# Patient Record
Sex: FEMALE | Race: WHITE | NOT HISPANIC OR LATINO | Employment: UNEMPLOYED | URBAN - NONMETROPOLITAN AREA
[De-identification: names, ages, dates, MRNs, and addresses within clinical notes are randomized per-mention and may not be internally consistent; named-entity substitution may affect disease eponyms.]

---

## 2023-03-09 DIAGNOSIS — G25.81 RESTLESS LEG: Primary | ICD-10-CM

## 2023-03-09 RX ORDER — ROPINIROLE 0.5 MG/1
0.5 TABLET, FILM COATED ORAL
COMMUNITY
End: 2023-03-09 | Stop reason: SDUPTHER

## 2023-03-09 RX ORDER — ROPINIROLE 0.5 MG/1
0.5 TABLET, FILM COATED ORAL NIGHTLY
Qty: 15 TABLET | Refills: 0 | Status: SHIPPED | OUTPATIENT
Start: 2023-03-09 | End: 2023-03-20 | Stop reason: SDUPTHER

## 2023-03-10 PROBLEM — R00.0 TACHYCARDIA: Status: ACTIVE | Noted: 2023-03-10

## 2023-03-10 PROBLEM — F41.1 ANXIETY, GENERALIZED: Status: ACTIVE | Noted: 2023-03-10

## 2023-03-10 PROBLEM — I10 BENIGN ESSENTIAL HYPERTENSION: Status: ACTIVE | Noted: 2023-03-10

## 2023-03-10 PROBLEM — E11.9 DIABETES MELLITUS (MULTI): Status: ACTIVE | Noted: 2023-03-10

## 2023-03-10 PROBLEM — G25.81 RESTLESS LEGS SYNDROME (RLS): Status: ACTIVE | Noted: 2023-03-10

## 2023-03-10 PROBLEM — K21.00 GASTRO-ESOPHAGEAL REFLUX DISEASE WITH ESOPHAGITIS: Status: ACTIVE | Noted: 2023-03-10

## 2023-03-10 PROBLEM — E66.813 CLASS 3 SEVERE OBESITY DUE TO EXCESS CALORIES WITH BODY MASS INDEX (BMI) OF 40.0 TO 44.9 IN ADULT: Status: ACTIVE | Noted: 2023-03-10

## 2023-03-10 PROBLEM — S69.92XA INJURY OF LEFT THUMB: Status: ACTIVE | Noted: 2023-03-10

## 2023-03-10 PROBLEM — E11.65 POORLY CONTROLLED TYPE 2 DIABETES MELLITUS (MULTI): Status: ACTIVE | Noted: 2023-03-10

## 2023-03-10 PROBLEM — J18.9 COMMUNITY ACQUIRED PNEUMONIA: Status: ACTIVE | Noted: 2023-03-10

## 2023-03-10 PROBLEM — N28.1 RENAL CYST: Status: ACTIVE | Noted: 2023-03-10

## 2023-03-10 PROBLEM — E66.01 CLASS 3 SEVERE OBESITY DUE TO EXCESS CALORIES WITH BODY MASS INDEX (BMI) OF 40.0 TO 44.9 IN ADULT (MULTI): Status: ACTIVE | Noted: 2023-03-10

## 2023-03-10 PROBLEM — E78.5 HYPERLIPIDEMIA: Status: ACTIVE | Noted: 2023-03-10

## 2023-03-10 RX ORDER — SEMAGLUTIDE 1.34 MG/ML
INJECTION, SOLUTION SUBCUTANEOUS
COMMUNITY
Start: 2021-08-31 | End: 2023-03-20 | Stop reason: SDUPTHER

## 2023-03-10 RX ORDER — GABAPENTIN 300 MG/1
1 CAPSULE ORAL NIGHTLY
COMMUNITY
Start: 2022-06-01 | End: 2023-03-20 | Stop reason: SINTOL

## 2023-03-10 RX ORDER — METFORMIN HYDROCHLORIDE 500 MG/1
TABLET ORAL
COMMUNITY
End: 2023-03-20 | Stop reason: SDUPTHER

## 2023-03-10 RX ORDER — LISINOPRIL 10 MG/1
1 TABLET ORAL DAILY
COMMUNITY
Start: 2022-01-25 | End: 2023-03-20 | Stop reason: SINTOL

## 2023-03-10 RX ORDER — BUSPIRONE HYDROCHLORIDE 15 MG/1
15 TABLET ORAL 3 TIMES DAILY
COMMUNITY

## 2023-03-10 RX ORDER — BUPROPION HYDROCHLORIDE 75 MG/1
1 TABLET ORAL DAILY
COMMUNITY
Start: 2021-08-31 | End: 2023-03-20 | Stop reason: ALTCHOICE

## 2023-03-10 RX ORDER — HYDROXYZINE PAMOATE 50 MG/1
50 CAPSULE ORAL NIGHTLY PRN
COMMUNITY

## 2023-03-20 ENCOUNTER — OFFICE VISIT (OUTPATIENT)
Dept: PRIMARY CARE | Facility: CLINIC | Age: 53
End: 2023-03-20
Payer: COMMERCIAL

## 2023-03-20 VITALS
HEART RATE: 91 BPM | BODY MASS INDEX: 44.02 KG/M2 | WEIGHT: 239.2 LBS | DIASTOLIC BLOOD PRESSURE: 72 MMHG | OXYGEN SATURATION: 96 % | SYSTOLIC BLOOD PRESSURE: 158 MMHG | HEIGHT: 62 IN

## 2023-03-20 DIAGNOSIS — F41.1 ANXIETY, GENERALIZED: ICD-10-CM

## 2023-03-20 DIAGNOSIS — G25.81 RESTLESS LEG: ICD-10-CM

## 2023-03-20 DIAGNOSIS — E11.9 TYPE 2 DIABETES MELLITUS WITHOUT COMPLICATION, WITHOUT LONG-TERM CURRENT USE OF INSULIN (MULTI): ICD-10-CM

## 2023-03-20 DIAGNOSIS — E11.65 POORLY CONTROLLED TYPE 2 DIABETES MELLITUS (MULTI): ICD-10-CM

## 2023-03-20 DIAGNOSIS — G25.81 RESTLESS LEGS SYNDROME (RLS): Primary | ICD-10-CM

## 2023-03-20 DIAGNOSIS — E66.01 CLASS 3 SEVERE OBESITY DUE TO EXCESS CALORIES WITH SERIOUS COMORBIDITY AND BODY MASS INDEX (BMI) OF 40.0 TO 44.9 IN ADULT (MULTI): ICD-10-CM

## 2023-03-20 DIAGNOSIS — N95.2 ATROPHIC VAGINITIS: ICD-10-CM

## 2023-03-20 DIAGNOSIS — E78.1 PURE HYPERTRIGLYCERIDEMIA: ICD-10-CM

## 2023-03-20 PROBLEM — J18.9 COMMUNITY ACQUIRED PNEUMONIA: Status: RESOLVED | Noted: 2023-03-10 | Resolved: 2023-03-20

## 2023-03-20 PROBLEM — I10 BENIGN ESSENTIAL HYPERTENSION: Status: RESOLVED | Noted: 2023-03-10 | Resolved: 2023-03-20

## 2023-03-20 LAB
ALANINE AMINOTRANSFERASE (SGPT) (U/L) IN SER/PLAS: 25 U/L (ref 7–45)
ALBUMIN (G/DL) IN SER/PLAS: 4.4 G/DL (ref 3.4–5)
ALBUMIN (MG/L) IN URINE: <7 MG/L
ALBUMIN/CREATININE (UG/MG) IN URINE: NORMAL UG/MG CRT (ref 0–30)
ALKALINE PHOSPHATASE (U/L) IN SER/PLAS: 86 U/L (ref 33–110)
ANION GAP IN SER/PLAS: 11 MMOL/L (ref 10–20)
ASPARTATE AMINOTRANSFERASE (SGOT) (U/L) IN SER/PLAS: 16 U/L (ref 9–39)
BILIRUBIN TOTAL (MG/DL) IN SER/PLAS: 0.4 MG/DL (ref 0–1.2)
CALCIUM (MG/DL) IN SER/PLAS: 9.1 MG/DL (ref 8.6–10.3)
CARBON DIOXIDE, TOTAL (MMOL/L) IN SER/PLAS: 30 MMOL/L (ref 21–32)
CHLORIDE (MMOL/L) IN SER/PLAS: 99 MMOL/L (ref 98–107)
CHOLESTEROL (MG/DL) IN SER/PLAS: 178 MG/DL (ref 0–199)
CHOLESTEROL IN HDL (MG/DL) IN SER/PLAS: 71.7 MG/DL
CHOLESTEROL/HDL RATIO: 2.5
CREATININE (MG/DL) IN SER/PLAS: 0.82 MG/DL (ref 0.5–1.05)
CREATININE (MG/DL) IN URINE: 74.2 MG/DL (ref 20–320)
GFR FEMALE: 86 ML/MIN/1.73M2
GLUCOSE (MG/DL) IN SER/PLAS: 321 MG/DL (ref 74–99)
LDL: 76 MG/DL (ref 0–99)
POC HEMOGLOBIN A1C: 11 % (ref 4.2–6.5)
POTASSIUM (MMOL/L) IN SER/PLAS: 5 MMOL/L (ref 3.5–5.3)
PROTEIN TOTAL: 6.7 G/DL (ref 6.4–8.2)
SODIUM (MMOL/L) IN SER/PLAS: 135 MMOL/L (ref 136–145)
TRIGLYCERIDE (MG/DL) IN SER/PLAS: 154 MG/DL (ref 0–149)
UREA NITROGEN (MG/DL) IN SER/PLAS: 13 MG/DL (ref 6–23)
VLDL: 31 MG/DL (ref 0–40)

## 2023-03-20 PROCEDURE — 99214 OFFICE O/P EST MOD 30 MIN: CPT | Performed by: FAMILY MEDICINE

## 2023-03-20 PROCEDURE — 82570 ASSAY OF URINE CREATININE: CPT

## 2023-03-20 PROCEDURE — 3078F DIAST BP <80 MM HG: CPT | Performed by: FAMILY MEDICINE

## 2023-03-20 PROCEDURE — 1036F TOBACCO NON-USER: CPT | Performed by: FAMILY MEDICINE

## 2023-03-20 PROCEDURE — 80053 COMPREHEN METABOLIC PANEL: CPT

## 2023-03-20 PROCEDURE — 36415 COLL VENOUS BLD VENIPUNCTURE: CPT | Performed by: FAMILY MEDICINE

## 2023-03-20 PROCEDURE — 3077F SYST BP >= 140 MM HG: CPT | Performed by: FAMILY MEDICINE

## 2023-03-20 PROCEDURE — 83036 HEMOGLOBIN GLYCOSYLATED A1C: CPT | Performed by: FAMILY MEDICINE

## 2023-03-20 PROCEDURE — 80061 LIPID PANEL: CPT

## 2023-03-20 PROCEDURE — 82043 UR ALBUMIN QUANTITATIVE: CPT

## 2023-03-20 PROCEDURE — 3008F BODY MASS INDEX DOCD: CPT | Performed by: FAMILY MEDICINE

## 2023-03-20 PROCEDURE — 4010F ACE/ARB THERAPY RXD/TAKEN: CPT | Performed by: FAMILY MEDICINE

## 2023-03-20 RX ORDER — ESTRADIOL 0.1 MG/G
2 CREAM VAGINAL DAILY
Qty: 42.5 G | Refills: 5 | Status: SHIPPED | OUTPATIENT
Start: 2023-03-20 | End: 2024-06-09 | Stop reason: HOSPADM

## 2023-03-20 RX ORDER — METFORMIN HYDROCHLORIDE 500 MG/1
TABLET ORAL
Qty: 180 TABLET | Refills: 3 | Status: SHIPPED | OUTPATIENT
Start: 2023-03-20 | End: 2024-04-05 | Stop reason: SDUPTHER

## 2023-03-20 RX ORDER — ESCITALOPRAM OXALATE 20 MG/1
1 TABLET ORAL DAILY
COMMUNITY
Start: 2023-03-10

## 2023-03-20 RX ORDER — LOSARTAN POTASSIUM 50 MG/1
50 TABLET ORAL ONCE
Status: DISCONTINUED | OUTPATIENT
Start: 2023-03-20 | End: 2024-06-09 | Stop reason: HOSPADM

## 2023-03-20 RX ORDER — ROPINIROLE 0.5 MG/1
0.5 TABLET, FILM COATED ORAL NIGHTLY
Qty: 90 TABLET | Refills: 3 | Status: SHIPPED | OUTPATIENT
Start: 2023-03-20 | End: 2024-03-28

## 2023-03-20 RX ORDER — SEMAGLUTIDE 1.34 MG/ML
1 INJECTION, SOLUTION SUBCUTANEOUS
Qty: 3 EACH | Refills: 3
Start: 2023-03-20 | End: 2024-05-21 | Stop reason: SDUPTHER

## 2023-03-20 RX ORDER — SEMAGLUTIDE 1.34 MG/ML
INJECTION, SOLUTION SUBCUTANEOUS
Qty: 1 EACH | Refills: 1
Start: 2023-03-20 | End: 2024-06-09 | Stop reason: HOSPADM

## 2023-03-20 RX ORDER — METFORMIN HYDROCHLORIDE 500 MG/1
TABLET ORAL
Qty: 180 TABLET | Refills: 3 | Status: SHIPPED | OUTPATIENT
Start: 2023-03-20 | End: 2023-03-20 | Stop reason: SDUPTHER

## 2023-03-20 ASSESSMENT — ENCOUNTER SYMPTOMS
COUGH: 0
PALPITATIONS: 0
ACTIVITY CHANGE: 0
APNEA: 0
SHORTNESS OF BREATH: 0
APPETITE CHANGE: 1

## 2023-03-20 NOTE — PROGRESS NOTES
"Subjective   Patient ID: Razia Gunn is a 52 y.o. female who presents for Med Refill.    HPI     Review of Systems   Constitutional:  Positive for appetite change. Negative for activity change.        Appetite increase significantly after she was unable to get Ozempic she gained quite a bit of weight back.  Severe stressors currently  has melanoma which is metastasized to lymph nodes.  Undergoing immunotherapy and radiation.    HENT:  Negative for congestion.    Respiratory:  Negative for apnea, cough and shortness of breath.    Cardiovascular:  Negative for chest pain and palpitations.       Objective   /72   Pulse 91   Ht 1.575 m (5' 2\")   Wt 109 kg (239 lb 3.2 oz)   SpO2 96%   BMI 43.75 kg/m²     Physical Exam    Assessment/Plan   General: alert, no apparent distress, good hygiene   HEAD:  Normocephalic, atraumatic    EARS:  EAC patent, TMs normal,   EYES:  sclera white, CRESCENCIO, conjunctiva noninjected  NOSE: Nasal passages patent   MOUTH: Pharynx clear, upper and lower dentures, tongue uvula midline, grade 4 airway  Neck:  supple, no masses, thyroid non enlarged non nodular, no cervical adenopathy,  Lungs:  no wheezing, no rales , no rhonchi, normal respiratory pattern, breath sounds not diminished  Heart:  regular rate and  rhythm, no murmur, no ectopy, no S3 or S4, no carotid bruits  Abdomen:  soft NT,BS + ,  no organomegaly, no masses, no bruits  Extremities:  no edema, no cyanosis, no clubbing,  2+ posterior tibialis pulse    Psych:  speech fluent, normal affect, normal thought process  Skin:  no rashes, no concerning skin lesions, normal texture  Neuro:  normal gait   Problem List Items Addressed This Visit       Anxiety, generalized    Class 3 severe obesity due to excess calories with body mass index (BMI) of 40.0 to 44.9 in adult (CMS/[facility])    Diabetes mellitus (CMS/[facility])    Relevant Medications    semaglutide (Ozempic) 0.25 mg or 0.5 mg(2 mg/1.5 mL) pen injector    semaglutide " (Ozempic) 1 mg/dose (2 mg/1.5 mL) pen injector    metFORMIN (Glucophage) 500 mg tablet    Other Relevant Orders    Albumin, urine, random    Comprehensive metabolic panel    Lipid Panel    POCT glycosylated hemoglobin (Hb A1C) manually resulted    Hyperlipidemia    Relevant Orders    Lipid Panel    Poorly controlled type 2 diabetes mellitus (CMS/HCC)    Restless legs syndrome (RLS) - Primary     Other Visit Diagnoses       Restless leg        Relevant Medications    rOPINIRole (Requip) 0.5 mg tablet        Patient was given patient assistance form for complete return to office so we can complete the rest.   Discussed the need to slowly uptitrate Ozempic again.  Discussed the need to control diabetes better.  Patient long history of very poor follow-up.  Encouraged having mammogram colorectal cancer screening.  She was given a handout for [facility] P project.

## 2023-06-06 ENCOUNTER — TELEPHONE (OUTPATIENT)
Dept: PRIMARY CARE | Facility: CLINIC | Age: 53
End: 2023-06-06
Payer: COMMERCIAL

## 2023-06-06 DIAGNOSIS — B37.31 CANDIDAL VAGINITIS: Primary | ICD-10-CM

## 2023-06-06 RX ORDER — FLUCONAZOLE 150 MG/1
TABLET ORAL
Qty: 2 TABLET | Refills: 0 | Status: SHIPPED | OUTPATIENT
Start: 2023-06-06 | End: 2023-07-17 | Stop reason: SDUPTHER

## 2023-07-03 ENCOUNTER — TELEPHONE (OUTPATIENT)
Dept: PRIMARY CARE | Facility: CLINIC | Age: 53
End: 2023-07-03
Payer: COMMERCIAL

## 2023-07-17 DIAGNOSIS — B37.31 CANDIDAL VAGINITIS: ICD-10-CM

## 2023-07-17 RX ORDER — FLUCONAZOLE 150 MG/1
TABLET ORAL
Qty: 12 TABLET | Refills: 0 | Status: SHIPPED | OUTPATIENT
Start: 2023-07-17 | End: 2023-08-31 | Stop reason: ALTCHOICE

## 2023-08-23 ENCOUNTER — TELEPHONE (OUTPATIENT)
Dept: PRIMARY CARE | Facility: CLINIC | Age: 53
End: 2023-08-23
Payer: COMMERCIAL

## 2023-08-31 ENCOUNTER — TELEPHONE (OUTPATIENT)
Dept: PRIMARY CARE | Facility: CLINIC | Age: 53
End: 2023-08-31
Payer: COMMERCIAL

## 2023-08-31 DIAGNOSIS — R11.0 NAUSEA: Primary | ICD-10-CM

## 2023-08-31 RX ORDER — ONDANSETRON 4 MG/1
4 TABLET, FILM COATED ORAL EVERY 8 HOURS PRN
Qty: 20 TABLET | Refills: 2 | Status: SHIPPED | OUTPATIENT
Start: 2023-08-31 | End: 2023-09-20

## 2023-10-19 ENCOUNTER — HOSPITAL ENCOUNTER (EMERGENCY)
Facility: HOSPITAL | Age: 53
Discharge: HOME | End: 2023-10-20
Attending: EMERGENCY MEDICINE
Payer: COMMERCIAL

## 2023-10-19 ENCOUNTER — APPOINTMENT (OUTPATIENT)
Dept: RADIOLOGY | Facility: HOSPITAL | Age: 53
End: 2023-10-19
Payer: COMMERCIAL

## 2023-10-19 DIAGNOSIS — R10.13 EPIGASTRIC PAIN: Primary | ICD-10-CM

## 2023-10-19 LAB
ALBUMIN SERPL BCP-MCNC: 4.8 G/DL (ref 3.4–5)
ALP SERPL-CCNC: 98 U/L (ref 33–110)
ALT SERPL W P-5'-P-CCNC: 87 U/L (ref 7–45)
ANION GAP SERPL CALC-SCNC: 15 MMOL/L (ref 10–20)
AST SERPL W P-5'-P-CCNC: 54 U/L (ref 9–39)
BASOPHILS # BLD AUTO: 0.02 X10*3/UL (ref 0–0.1)
BASOPHILS NFR BLD AUTO: 0.3 %
BILIRUB SERPL-MCNC: 0.9 MG/DL (ref 0–1.2)
BUN SERPL-MCNC: 15 MG/DL (ref 6–23)
CALCIUM SERPL-MCNC: 9.4 MG/DL (ref 8.6–10.3)
CHLORIDE SERPL-SCNC: 102 MMOL/L (ref 98–107)
CO2 SERPL-SCNC: 25 MMOL/L (ref 21–32)
CREAT SERPL-MCNC: 0.89 MG/DL (ref 0.5–1.05)
EOSINOPHIL # BLD AUTO: 0.16 X10*3/UL (ref 0–0.7)
EOSINOPHIL NFR BLD AUTO: 2.4 %
ERYTHROCYTE [DISTWIDTH] IN BLOOD BY AUTOMATED COUNT: 11.9 % (ref 11.5–14.5)
GFR SERPL CREATININE-BSD FRML MDRD: 78 ML/MIN/1.73M*2
GLUCOSE SERPL-MCNC: 138 MG/DL (ref 74–99)
HCT VFR BLD AUTO: 49.1 % (ref 36–46)
HGB BLD-MCNC: 16.5 G/DL (ref 12–16)
IMM GRANULOCYTES # BLD AUTO: 0 X10*3/UL (ref 0–0.7)
IMM GRANULOCYTES NFR BLD AUTO: 0 % (ref 0–0.9)
LIPASE SERPL-CCNC: 11 U/L (ref 9–82)
LYMPHOCYTES # BLD AUTO: 1.62 X10*3/UL (ref 1.2–4.8)
LYMPHOCYTES NFR BLD AUTO: 24.2 %
MAGNESIUM SERPL-MCNC: 1.52 MG/DL (ref 1.6–2.4)
MCH RBC QN AUTO: 31.1 PG (ref 26–34)
MCHC RBC AUTO-ENTMCNC: 33.6 G/DL (ref 32–36)
MCV RBC AUTO: 93 FL (ref 80–100)
MONOCYTES # BLD AUTO: 0.38 X10*3/UL (ref 0.1–1)
MONOCYTES NFR BLD AUTO: 5.7 %
NEUTROPHILS # BLD AUTO: 4.51 X10*3/UL (ref 1.2–7.7)
NEUTROPHILS NFR BLD AUTO: 67.4 %
NRBC BLD-RTO: 0 /100 WBCS (ref 0–0)
PLATELET # BLD AUTO: 258 X10*3/UL (ref 150–450)
PMV BLD AUTO: 9.9 FL (ref 7.5–11.5)
POTASSIUM SERPL-SCNC: 3.5 MMOL/L (ref 3.5–5.3)
PROT SERPL-MCNC: 7.7 G/DL (ref 6.4–8.2)
RBC # BLD AUTO: 5.3 X10*6/UL (ref 4–5.2)
SODIUM SERPL-SCNC: 138 MMOL/L (ref 136–145)
WBC # BLD AUTO: 6.7 X10*3/UL (ref 4.4–11.3)

## 2023-10-19 PROCEDURE — 2550000001 HC RX 255 CONTRASTS: Performed by: EMERGENCY MEDICINE

## 2023-10-19 PROCEDURE — 96375 TX/PRO/DX INJ NEW DRUG ADDON: CPT

## 2023-10-19 PROCEDURE — 74177 CT ABD & PELVIS W/CONTRAST: CPT

## 2023-10-19 PROCEDURE — 96361 HYDRATE IV INFUSION ADD-ON: CPT

## 2023-10-19 PROCEDURE — 36415 COLL VENOUS BLD VENIPUNCTURE: CPT | Performed by: STUDENT IN AN ORGANIZED HEALTH CARE EDUCATION/TRAINING PROGRAM

## 2023-10-19 PROCEDURE — 85025 COMPLETE CBC W/AUTO DIFF WBC: CPT | Performed by: STUDENT IN AN ORGANIZED HEALTH CARE EDUCATION/TRAINING PROGRAM

## 2023-10-19 PROCEDURE — 83690 ASSAY OF LIPASE: CPT | Performed by: STUDENT IN AN ORGANIZED HEALTH CARE EDUCATION/TRAINING PROGRAM

## 2023-10-19 PROCEDURE — 96366 THER/PROPH/DIAG IV INF ADDON: CPT

## 2023-10-19 PROCEDURE — 74177 CT ABD & PELVIS W/CONTRAST: CPT | Performed by: RADIOLOGY

## 2023-10-19 PROCEDURE — 2500000004 HC RX 250 GENERAL PHARMACY W/ HCPCS (ALT 636 FOR OP/ED): Performed by: STUDENT IN AN ORGANIZED HEALTH CARE EDUCATION/TRAINING PROGRAM

## 2023-10-19 PROCEDURE — 83735 ASSAY OF MAGNESIUM: CPT | Performed by: STUDENT IN AN ORGANIZED HEALTH CARE EDUCATION/TRAINING PROGRAM

## 2023-10-19 PROCEDURE — 99284 EMERGENCY DEPT VISIT MOD MDM: CPT | Performed by: EMERGENCY MEDICINE

## 2023-10-19 PROCEDURE — 80053 COMPREHEN METABOLIC PANEL: CPT | Performed by: STUDENT IN AN ORGANIZED HEALTH CARE EDUCATION/TRAINING PROGRAM

## 2023-10-19 PROCEDURE — 96365 THER/PROPH/DIAG IV INF INIT: CPT

## 2023-10-19 PROCEDURE — 2500000004 HC RX 250 GENERAL PHARMACY W/ HCPCS (ALT 636 FOR OP/ED): Performed by: EMERGENCY MEDICINE

## 2023-10-19 RX ORDER — ONDANSETRON HYDROCHLORIDE 2 MG/ML
4 INJECTION, SOLUTION INTRAVENOUS ONCE
Status: COMPLETED | OUTPATIENT
Start: 2023-10-19 | End: 2023-10-19

## 2023-10-19 RX ORDER — DICYCLOMINE HYDROCHLORIDE 10 MG/ML
20 INJECTION INTRAMUSCULAR ONCE
Status: COMPLETED | OUTPATIENT
Start: 2023-10-20 | End: 2023-10-20

## 2023-10-19 RX ORDER — KETOROLAC TROMETHAMINE 15 MG/ML
15 INJECTION, SOLUTION INTRAMUSCULAR; INTRAVENOUS ONCE
Status: COMPLETED | OUTPATIENT
Start: 2023-10-20 | End: 2023-10-20

## 2023-10-19 RX ORDER — MAGNESIUM SULFATE HEPTAHYDRATE 40 MG/ML
2 INJECTION, SOLUTION INTRAVENOUS ONCE
Status: COMPLETED | OUTPATIENT
Start: 2023-10-19 | End: 2023-10-20

## 2023-10-19 RX ADMIN — MAGNESIUM SULFATE HEPTAHYDRATE 2 G: 2 INJECTION, SOLUTION INTRAVENOUS at 23:25

## 2023-10-19 RX ADMIN — HYDROMORPHONE HYDROCHLORIDE 0.5 MG: 1 INJECTION, SOLUTION INTRAMUSCULAR; INTRAVENOUS; SUBCUTANEOUS at 22:13

## 2023-10-19 RX ADMIN — SODIUM CHLORIDE, POTASSIUM CHLORIDE, SODIUM LACTATE AND CALCIUM CHLORIDE 1000 ML: 600; 310; 30; 20 INJECTION, SOLUTION INTRAVENOUS at 22:13

## 2023-10-19 RX ADMIN — IOHEXOL 75 ML: 350 INJECTION, SOLUTION INTRAVENOUS at 23:00

## 2023-10-19 RX ADMIN — ONDANSETRON 4 MG: 2 INJECTION INTRAMUSCULAR; INTRAVENOUS at 22:13

## 2023-10-19 ASSESSMENT — PAIN DESCRIPTION - ORIENTATION: ORIENTATION: MID

## 2023-10-19 ASSESSMENT — PAIN DESCRIPTION - DESCRIPTORS
DESCRIPTORS: ACHING
DESCRIPTORS: ACHING

## 2023-10-19 ASSESSMENT — LIFESTYLE VARIABLES
HAVE PEOPLE ANNOYED YOU BY CRITICIZING YOUR DRINKING: NO
EVER FELT BAD OR GUILTY ABOUT YOUR DRINKING: NO
HAVE YOU EVER FELT YOU SHOULD CUT DOWN ON YOUR DRINKING: NO
EVER HAD A DRINK FIRST THING IN THE MORNING TO STEADY YOUR NERVES TO GET RID OF A HANGOVER: NO
REASON UNABLE TO ASSESS: NO

## 2023-10-19 ASSESSMENT — PAIN - FUNCTIONAL ASSESSMENT
PAIN_FUNCTIONAL_ASSESSMENT: 0-10
PAIN_FUNCTIONAL_ASSESSMENT: 0-10

## 2023-10-19 ASSESSMENT — PAIN SCALES - GENERAL
PAINLEVEL_OUTOF10: 8
PAINLEVEL_OUTOF10: 8

## 2023-10-19 ASSESSMENT — COLUMBIA-SUICIDE SEVERITY RATING SCALE - C-SSRS
2. HAVE YOU ACTUALLY HAD ANY THOUGHTS OF KILLING YOURSELF?: NO
6. HAVE YOU EVER DONE ANYTHING, STARTED TO DO ANYTHING, OR PREPARED TO DO ANYTHING TO END YOUR LIFE?: NO
1. IN THE PAST MONTH, HAVE YOU WISHED YOU WERE DEAD OR WISHED YOU COULD GO TO SLEEP AND NOT WAKE UP?: NO

## 2023-10-19 ASSESSMENT — PAIN DESCRIPTION - LOCATION
LOCATION: ABDOMEN
LOCATION: ABDOMEN

## 2023-10-20 VITALS
WEIGHT: 200.62 LBS | BODY MASS INDEX: 30.41 KG/M2 | SYSTOLIC BLOOD PRESSURE: 123 MMHG | HEIGHT: 68 IN | HEART RATE: 87 BPM | RESPIRATION RATE: 18 BRPM | TEMPERATURE: 97.7 F | DIASTOLIC BLOOD PRESSURE: 82 MMHG | OXYGEN SATURATION: 93 %

## 2023-10-20 PROCEDURE — 96372 THER/PROPH/DIAG INJ SC/IM: CPT

## 2023-10-20 PROCEDURE — 2500000004 HC RX 250 GENERAL PHARMACY W/ HCPCS (ALT 636 FOR OP/ED): Performed by: EMERGENCY MEDICINE

## 2023-10-20 PROCEDURE — 96375 TX/PRO/DX INJ NEW DRUG ADDON: CPT

## 2023-10-20 RX ORDER — ONDANSETRON 4 MG/1
4 TABLET, FILM COATED ORAL EVERY 8 HOURS PRN
Qty: 12 TABLET | Refills: 0 | Status: SHIPPED | OUTPATIENT
Start: 2023-10-20 | End: 2023-10-24

## 2023-10-20 RX ORDER — DICYCLOMINE HYDROCHLORIDE 20 MG/1
10 TABLET ORAL 4 TIMES DAILY PRN
Qty: 30 TABLET | Refills: 0 | Status: SHIPPED | OUTPATIENT
Start: 2023-10-20

## 2023-10-20 RX ADMIN — KETOROLAC TROMETHAMINE 15 MG: 15 INJECTION INTRAMUSCULAR; INTRAVENOUS at 00:19

## 2023-10-20 RX ADMIN — DICYCLOMINE HYDROCHLORIDE 20 MG: 20 INJECTION, SOLUTION INTRAMUSCULAR at 00:19

## 2023-10-20 NOTE — ED PROVIDER NOTES
HPI  Patient is a 53-year-old female presented to the emergency department for abdominal pain.  Reports this been going on for the past 2 days.  Has had increased amounts of watery diarrhea with 5-7 episodes per day.  Reports that she is unable to tolerate p.o. that she immediately will vomit everything she eats.  Denies any associated subjective fevers or chest pain, however does report some nausea without vomiting.  Patient is additionally reporting some dysuria without hematuria.  Of note, patient is postcholecystectomy secondary to cholelithiasis.    Physical Exam  VITALS: Vital signs reviewed in nursing triage note, EMR flow sheets, and at patient's bedside  CONSTITUTIONAL: Well-appearing, in no apparent distress  HEAD: NCAT  EYES: PERRL, EOMI  NECK: Supple, non-tender, full ROM  CARD: RRR, no m/r/g, no JVD  RESP: LCTAB, speaking full sentences, no increased work of breathing, no use of accessory respiratory muscles  ABD: Tenderness to palpation diffusely located mostly in the epigastrium and the right upper quadrant, Garcia sign positive, Rovsing sign negative, no tenderness of McBurney's point, mild suprapubic fullness appreciated  BACK: No vertebral point or CVA tenderness, no obvious bony deformities, no spinal step-offs   EXT: Normal ROM in all four extremities, 2+ radial and DP pulses bilaterally, no edema  SKIN: Dry with appropriate turgor, no apparent rashes, suspicious lesions, or masses   NEURO: CNs II-XII grossly intact, AAOx4, sensation intact bilaterally, strength 5/5 on UEs and LEs bilaterally, speech is fluent and comprehensible  PSYCH: Appropriate mood and affect, no HI/SI, not responding to internal stimuli    Vitals:    10/19/23 1748   BP: (!) 131/98   Pulse: 106   Resp: 18   Temp: 36.4 °C (97.5 °F)       Assessment/Plan/MDM  Patient clinically stable with normal vital signs upon presentation to the emergency department.  Given her area of pain, will send for CBC, CMP, lipase, magnesium, as  well as a UA given her dysuria.  Provided with a 1 L bolus of fluids, Dilaudid 0.5 mg IV, and Zofran 4 mg IV for symptomatic control.  Given the profuse watery diarrhea, will send for CT A/P to rule out any evidence of significant colonic pathology.  If all work-up is normal, patient will have to tolerate p.o. challenge.  Suspect this will likely prevent her from being discharged at this time, however will be sent out to the oncoming provider pending labs, imaging, and p.o. challenge.    Results  *See section(s) entitled ``Lab Results´´, ``Diagnostic Imaging Results Review´´ for entirety.  Notable results listed below  - EKG, labs, and imaging pending at the time of signout    Clinical Impression  Abdominal pain    Dispo: Signed out to Dr. Cruz at 2200; please see their note for full details regarding disposition.    Patient seen and discussed with attending physician Dr. Cruz.    Humble Long MD  Emergency Medicine, PGY-3    Was dictated using Dragon dictation. Please excuse any errors found in the note.     Humble Long MD  Resident  10/19/23 7048

## 2024-03-28 DIAGNOSIS — G25.81 RESTLESS LEG: ICD-10-CM

## 2024-03-28 RX ORDER — ROPINIROLE 0.5 MG/1
0.5 TABLET, FILM COATED ORAL NIGHTLY
Qty: 90 TABLET | Refills: 0 | Status: SHIPPED | OUTPATIENT
Start: 2024-03-28 | End: 2024-04-05 | Stop reason: SDUPTHER

## 2024-04-05 ENCOUNTER — OFFICE VISIT (OUTPATIENT)
Dept: PRIMARY CARE | Facility: CLINIC | Age: 54
End: 2024-04-05
Payer: COMMERCIAL

## 2024-04-05 VITALS
WEIGHT: 214 LBS | HEIGHT: 63 IN | SYSTOLIC BLOOD PRESSURE: 124 MMHG | BODY MASS INDEX: 37.92 KG/M2 | OXYGEN SATURATION: 96 % | HEART RATE: 89 BPM | DIASTOLIC BLOOD PRESSURE: 88 MMHG

## 2024-04-05 DIAGNOSIS — E11.9 TYPE 2 DIABETES MELLITUS WITHOUT COMPLICATION, WITHOUT LONG-TERM CURRENT USE OF INSULIN (MULTI): Primary | ICD-10-CM

## 2024-04-05 DIAGNOSIS — R23.2 HOT FLASHES: ICD-10-CM

## 2024-04-05 DIAGNOSIS — G25.81 RESTLESS LEG: ICD-10-CM

## 2024-04-05 DIAGNOSIS — E78.1 PURE HYPERTRIGLYCERIDEMIA: ICD-10-CM

## 2024-04-05 LAB
ALBUMIN SERPL BCP-MCNC: 4 G/DL (ref 3.4–5)
ALP SERPL-CCNC: 84 U/L (ref 33–110)
ALT SERPL W P-5'-P-CCNC: 23 U/L (ref 7–45)
ANION GAP SERPL CALC-SCNC: 18 MMOL/L (ref 10–20)
AST SERPL W P-5'-P-CCNC: 14 U/L (ref 9–39)
BILIRUB SERPL-MCNC: 0.4 MG/DL (ref 0–1.2)
BUN SERPL-MCNC: 17 MG/DL (ref 6–23)
CALCIUM SERPL-MCNC: 9.3 MG/DL (ref 8.6–10.3)
CHLORIDE SERPL-SCNC: 99 MMOL/L (ref 98–107)
CHOLEST SERPL-MCNC: 186 MG/DL (ref 0–199)
CHOLESTEROL/HDL RATIO: 2.8
CO2 SERPL-SCNC: 27 MMOL/L (ref 21–32)
CREAT SERPL-MCNC: 0.84 MG/DL (ref 0.5–1.05)
EGFRCR SERPLBLD CKD-EPI 2021: 84 ML/MIN/1.73M*2
GLUCOSE SERPL-MCNC: 290 MG/DL (ref 74–99)
HDLC SERPL-MCNC: 67.3 MG/DL
LDLC SERPL CALC-MCNC: 86 MG/DL
NON HDL CHOLESTEROL: 119 MG/DL (ref 0–149)
POC HEMOGLOBIN A1C: 8.7 % (ref 4.2–6.5)
POTASSIUM SERPL-SCNC: 4.8 MMOL/L (ref 3.5–5.3)
PROT SERPL-MCNC: 6.6 G/DL (ref 6.4–8.2)
SODIUM SERPL-SCNC: 139 MMOL/L (ref 136–145)
TRIGL SERPL-MCNC: 166 MG/DL (ref 0–149)
TSH SERPL-ACNC: 2.05 MIU/L (ref 0.44–3.98)
VLDL: 33 MG/DL (ref 0–40)

## 2024-04-05 PROCEDURE — 80061 LIPID PANEL: CPT

## 2024-04-05 PROCEDURE — 1036F TOBACCO NON-USER: CPT

## 2024-04-05 PROCEDURE — 82043 UR ALBUMIN QUANTITATIVE: CPT

## 2024-04-05 PROCEDURE — 3048F LDL-C <100 MG/DL: CPT

## 2024-04-05 PROCEDURE — 84443 ASSAY THYROID STIM HORMONE: CPT

## 2024-04-05 PROCEDURE — 3074F SYST BP LT 130 MM HG: CPT

## 2024-04-05 PROCEDURE — 4010F ACE/ARB THERAPY RXD/TAKEN: CPT

## 2024-04-05 PROCEDURE — 99396 PREV VISIT EST AGE 40-64: CPT

## 2024-04-05 PROCEDURE — 3061F NEG MICROALBUMINURIA REV: CPT

## 2024-04-05 PROCEDURE — 82570 ASSAY OF URINE CREATININE: CPT

## 2024-04-05 PROCEDURE — 36415 COLL VENOUS BLD VENIPUNCTURE: CPT

## 2024-04-05 PROCEDURE — 80053 COMPREHEN METABOLIC PANEL: CPT

## 2024-04-05 PROCEDURE — 83036 HEMOGLOBIN GLYCOSYLATED A1C: CPT

## 2024-04-05 PROCEDURE — 3079F DIAST BP 80-89 MM HG: CPT

## 2024-04-05 RX ORDER — METFORMIN HYDROCHLORIDE 500 MG/1
TABLET ORAL
Qty: 180 TABLET | Refills: 3 | Status: SHIPPED | OUTPATIENT
Start: 2024-04-05

## 2024-04-05 RX ORDER — ROPINIROLE 0.5 MG/1
0.5 TABLET, FILM COATED ORAL NIGHTLY
Qty: 90 TABLET | Refills: 3 | Status: SHIPPED | OUTPATIENT
Start: 2024-04-05

## 2024-04-05 NOTE — PROGRESS NOTES
Subjective   Patient ID: Razia Gunn is a 53 y.o. female who presents for Annual Exam (Yearly physical for medication refills ).  HPI  Razia presents for yearly check-up  No other health concerns     DMT2  -ozempic through patient assistance and metfomin 1 pill at lunch and 1 pill at dinner  -has lost weight   -diet = does not eat much fruits, but eats pretty well   -sometimes nauseous but not too often  -has diarrhea on metformin but does not bother her     Restless leg   -ropinirole - working well     Anxiety   -sees a doctor in [facility] Reminder:  -Blood Work: today   -Hep C:   -Colonoscopy: declined   -Mammo: declined  -Dexa >65y:  -Pap: hysterectomy 2017   -Lung CA Screen: 50-80  -Shingrix >50:  -Pneumovax >65y, <65 if at risk, 5y between <65 and >65 dose:  -Flu: Yearly  -tdap:       Past Surgical History:   Procedure Laterality Date    CHOLECYSTECTOMY  05/28/2013    Cholecystectomy    TOTAL KNEE ARTHROPLASTY Right     TOTAL VAGINAL HYSTERECTOMY  01/23/2017    Vaginal Hysterectomy    TUBAL LIGATION  05/28/2013    Tubal Ligation      Past Medical History:   Diagnosis Date    Acute candidiasis of vulva and vagina 01/20/2015    Yeast vaginitis    Acute laryngitis 12/18/2014    Acute laryngitis    Acute maxillary sinusitis, unspecified 03/08/2020    Acute maxillary sinusitis    Candidiasis, unspecified 12/20/2017    Yeast infection    Depression     Diabetes mellitus (CMS/HCC)     Other conditions influencing health status     History of burning on urination    Other conditions influencing health status 09/30/2016    Cyst    Other conditions influencing health status 03/06/2014    History of dyspareunia    Other displaced fracture of base of first metacarpal bone, left hand, initial encounter for closed fracture 07/22/2021    Closed displaced fracture of base of first metacarpal bone of left hand, unspecified fracture morphology, initial encounter    Pelvic and perineal pain  01/23/2017    Pelvic pain in female    Personal history of other benign neoplasm 10/03/2016    History of uterine leiomyoma    Personal history of other diseases of the circulatory system 01/23/2017    History of hypertension    Personal history of other diseases of the musculoskeletal system and connective tissue 12/08/2015    History of low back pain    Personal history of other diseases of the respiratory system 02/24/2020    History of influenza    Personal history of other diseases of the respiratory system 09/26/2018    History of acute bronchitis    Personal history of other diseases of the respiratory system 02/15/2014    Personal history of acute sinusitis    Personal history of other diseases of the respiratory system 04/22/2019    History of acute bronchitis    Personal history of other diseases of the respiratory system 11/21/2017    History of acute sinusitis    Personal history of other diseases of urinary system 08/22/2017    History of cystocele    Personal history of other endocrine, nutritional and metabolic disease 10/16/2014    History of type 2 diabetes mellitus    Personal history of other mental and behavioral disorders 05/28/2013    History of depression    Personal history of other specified conditions 03/25/2014    History of chest pain    Personal history of other specified conditions 08/22/2017    History of dysuria    Personal history of other specified conditions 03/19/2015    History of diarrhea    Personal history of other specified conditions 10/10/2014    History of abdominal pain    Phlebitis and thrombophlebitis of other sites 03/20/2020    Thrombophlebitis arm    Phlebitis and thrombophlebitis of other sites 03/20/2020    Phlebitis of arm    Right lower quadrant pain 11/01/2013    Abdominal pain, RLQ (right lower quadrant)    Unspecified condition associated with female genital organs and menstrual cycle 12/21/2015    Adnexal cyst    Unspecified symptoms and signs involving the  "genitourinary system 10/17/2016    UTI symptoms     Social History     Tobacco Use    Smoking status: Never    Smokeless tobacco: Never   Substance Use Topics    Alcohol use: Never    Drug use: Never        Review of Systems  10 point review of systems performed and is negative except as noted in the HPI.      Current Outpatient Medications:     busPIRone (Buspar) 15 mg tablet, Take 1 tablet (15 mg) by mouth in the morning and 1 tablet (15 mg) in the evening and 1 tablet (15 mg) before bedtime., Disp: , Rfl:     dicyclomine (Bentyl) 20 mg tablet, Take 0.5 tablets (10 mg) by mouth 4 times a day as needed (abdominal pain)., Disp: 30 tablet, Rfl: 0    escitalopram (Lexapro) 20 mg tablet, Take 1 tablet (20 mg) by mouth once daily., Disp: , Rfl:     estradiol (Estrace) 0.01 % (0.1 mg/gram) vaginal cream, Insert 20 Applications into the vagina once daily. Apply to vagina nightly for 1 week then every Monday/Wednesday/Friday., Disp: 42.5 g, Rfl: 5    hydrOXYzine pamoate (Vistaril) 50 mg capsule, Take 1 capsule (50 mg) by mouth as needed at bedtime., Disp: , Rfl:     metFORMIN (Glucophage) 500 mg tablet, TAKE 1 TABLET BY MOUTH WITH WITH LUNCH AND SUPPER, Disp: 180 tablet, Rfl: 3    rOPINIRole (Requip) 0.5 mg tablet, Take 1 tablet (0.5 mg) by mouth once daily at bedtime., Disp: 90 tablet, Rfl: 3    semaglutide (Ozempic) 0.25 mg or 0.5 mg(2 mg/1.5 mL) pen injector, Inject 0.25 mg once a week for 4 weeks then increase to 0.5 mg weekly thereafter, Disp: 1 each, Rfl: 1    semaglutide (Ozempic) 1 mg/dose (2 mg/1.5 mL) pen injector, Inject 1 mg under the skin 1 (one) time per week., Disp: 3 each, Rfl: 3    Current Facility-Administered Medications:     losartan (Cozaar) tablet 50 mg, 50 mg, oral, Once, Sarabjit Cohen DO     Objective   /88   Pulse 89   Ht 1.6 m (5' 3\")   Wt 97.1 kg (214 lb)   SpO2 96%   BMI 37.91 kg/m²     Physical Exam  Constitutional:       General: She is not in acute distress.     Appearance: " Normal appearance. She is not ill-appearing or toxic-appearing.   HENT:      Head: Normocephalic and atraumatic.      Right Ear: Tympanic membrane, ear canal and external ear normal.      Left Ear: Tympanic membrane, ear canal and external ear normal.      Nose: Nose normal. No congestion or rhinorrhea.      Mouth/Throat:      Mouth: Mucous membranes are moist.      Pharynx: Oropharynx is clear. No oropharyngeal exudate or posterior oropharyngeal erythema.   Eyes:      Conjunctiva/sclera: Conjunctivae normal.      Pupils: Pupils are equal, round, and reactive to light.   Neck:      Vascular: No carotid bruit.   Cardiovascular:      Rate and Rhythm: Normal rate and regular rhythm.      Pulses: Normal pulses.      Heart sounds: Normal heart sounds. No murmur heard.  Pulmonary:      Effort: Pulmonary effort is normal.      Breath sounds: Normal breath sounds. No wheezing, rhonchi or rales.   Abdominal:      General: Bowel sounds are normal. There is no distension.      Palpations: Abdomen is soft. There is no mass.      Tenderness: There is no abdominal tenderness. There is no guarding or rebound.   Musculoskeletal:         General: Normal range of motion.      Cervical back: Normal range of motion and neck supple. No tenderness.      Right lower leg: No edema.      Left lower leg: No edema.   Lymphadenopathy:      Cervical: No cervical adenopathy.   Skin:     General: Skin is warm and dry.      Findings: No rash.   Neurological:      Mental Status: She is alert and oriented to person, place, and time.   Psychiatric:         Mood and Affect: Mood normal.         Behavior: Behavior normal.         Assessment/Plan   Problem List Items Addressed This Visit       Diabetes mellitus (CMS/[facility]) - Primary    Relevant Medications    metFORMIN (Glucophage) 500 mg tablet    Other Relevant Orders    POCT glycosylated hemoglobin (Hb A1C) manually resulted (Completed)    Comprehensive Metabolic Panel (Completed)    Albumin , Urine  Random (Completed)    Hyperlipidemia    Relevant Orders    Lipid Panel (Completed)     Other Visit Diagnoses       Restless leg        Relevant Medications    rOPINIRole (Requip) 0.5 mg tablet    Hot flashes        Relevant Orders    TSH with reflex to Free T4 if abnormal (Completed)          DM  Ozempic through patient assistance - has seen Dr. Kenney  Continue metformin - may need to increase dose   Labs today   Recommend eye doctor yearly     HLD  Check cholesterol today     Hot flashes   Check TSH   Had hysterectomy in 2017     Restless leg syndrome   Refill ropinirole     Patient declined preventative health screenings - she states she acknowledges the risks    Discussed at visit any disease processes that were of concern as well as the risks, benefits and instructions on any new medication provided. Patient (and/or caretaker of patient if present) stated all questions were answered, and they voiced understanding of instructions.

## 2024-04-06 LAB
CREAT UR-MCNC: 150.3 MG/DL (ref 20–320)
MICROALBUMIN UR-MCNC: 8.9 MG/L
MICROALBUMIN/CREAT UR: 5.9 UG/MG CREAT

## 2024-04-09 ENCOUNTER — APPOINTMENT (OUTPATIENT)
Dept: PRIMARY CARE | Facility: CLINIC | Age: 54
End: 2024-04-09
Payer: COMMERCIAL

## 2024-05-21 DIAGNOSIS — E11.9 TYPE 2 DIABETES MELLITUS WITHOUT COMPLICATION, WITHOUT LONG-TERM CURRENT USE OF INSULIN (MULTI): ICD-10-CM

## 2024-05-21 RX ORDER — SEMAGLUTIDE 1.34 MG/ML
1 INJECTION, SOLUTION SUBCUTANEOUS
Qty: 3 EACH | Refills: 3 | Status: SHIPPED | OUTPATIENT
Start: 2024-05-26 | End: 2024-05-21 | Stop reason: SDUPTHER

## 2024-05-21 RX ORDER — SEMAGLUTIDE 1.34 MG/ML
1 INJECTION, SOLUTION SUBCUTANEOUS
Qty: 3 EACH | Refills: 3 | Status: SHIPPED | OUTPATIENT
Start: 2024-05-26 | End: 2025-05-26

## 2024-06-06 ENCOUNTER — APPOINTMENT (OUTPATIENT)
Dept: RADIOLOGY | Facility: HOSPITAL | Age: 54
DRG: 392 | End: 2024-06-06
Payer: COMMERCIAL

## 2024-06-06 ENCOUNTER — APPOINTMENT (OUTPATIENT)
Dept: CARDIOLOGY | Facility: HOSPITAL | Age: 54
DRG: 392 | End: 2024-06-06
Payer: COMMERCIAL

## 2024-06-06 ENCOUNTER — HOSPITAL ENCOUNTER (INPATIENT)
Facility: HOSPITAL | Age: 54
LOS: 1 days | Discharge: HOME | End: 2024-06-09
Attending: STUDENT IN AN ORGANIZED HEALTH CARE EDUCATION/TRAINING PROGRAM | Admitting: STUDENT IN AN ORGANIZED HEALTH CARE EDUCATION/TRAINING PROGRAM
Payer: COMMERCIAL

## 2024-06-06 DIAGNOSIS — R74.01 TRANSAMINITIS: Primary | ICD-10-CM

## 2024-06-06 DIAGNOSIS — K52.9 ENTEROCOLITIS: ICD-10-CM

## 2024-06-06 LAB
ALBUMIN SERPL BCP-MCNC: 4.3 G/DL (ref 3.4–5)
ALP SERPL-CCNC: 109 U/L (ref 33–110)
ALT SERPL W P-5'-P-CCNC: 239 U/L (ref 7–45)
ANION GAP SERPL CALC-SCNC: 15 MMOL/L (ref 10–20)
APPEARANCE UR: CLEAR
AST SERPL W P-5'-P-CCNC: 468 U/L (ref 9–39)
BASOPHILS # BLD AUTO: 0.02 X10*3/UL (ref 0–0.1)
BASOPHILS NFR BLD AUTO: 0.3 %
BILIRUB SERPL-MCNC: 1.3 MG/DL (ref 0–1.2)
BILIRUB UR STRIP.AUTO-MCNC: NEGATIVE MG/DL
BUN SERPL-MCNC: 17 MG/DL (ref 6–23)
CALCIUM SERPL-MCNC: 9.2 MG/DL (ref 8.6–10.3)
CARDIAC TROPONIN I PNL SERPL HS: <3 NG/L (ref 0–13)
CHLORIDE SERPL-SCNC: 105 MMOL/L (ref 98–107)
CO2 SERPL-SCNC: 22 MMOL/L (ref 21–32)
COLOR UR: YELLOW
CREAT SERPL-MCNC: 0.89 MG/DL (ref 0.5–1.05)
EGFRCR SERPLBLD CKD-EPI 2021: 78 ML/MIN/1.73M*2
EOSINOPHIL # BLD AUTO: 0.11 X10*3/UL (ref 0–0.7)
EOSINOPHIL NFR BLD AUTO: 1.6 %
ERYTHROCYTE [DISTWIDTH] IN BLOOD BY AUTOMATED COUNT: 11.8 % (ref 11.5–14.5)
GLUCOSE SERPL-MCNC: 198 MG/DL (ref 74–99)
GLUCOSE UR STRIP.AUTO-MCNC: NEGATIVE MG/DL
HCT VFR BLD AUTO: 48 % (ref 36–46)
HGB BLD-MCNC: 16.7 G/DL (ref 12–16)
IMM GRANULOCYTES # BLD AUTO: 0.03 X10*3/UL (ref 0–0.7)
IMM GRANULOCYTES NFR BLD AUTO: 0.4 % (ref 0–0.9)
KETONES UR STRIP.AUTO-MCNC: NEGATIVE MG/DL
LACTATE SERPL-SCNC: 1.3 MMOL/L (ref 0.4–2)
LEUKOCYTE ESTERASE UR QL STRIP.AUTO: ABNORMAL
LIPASE SERPL-CCNC: 39 U/L (ref 9–82)
LYMPHOCYTES # BLD AUTO: 1.23 X10*3/UL (ref 1.2–4.8)
LYMPHOCYTES NFR BLD AUTO: 18.3 %
MAGNESIUM SERPL-MCNC: 1.51 MG/DL (ref 1.6–2.4)
MCH RBC QN AUTO: 31.7 PG (ref 26–34)
MCHC RBC AUTO-ENTMCNC: 34.8 G/DL (ref 32–36)
MCV RBC AUTO: 91 FL (ref 80–100)
MONOCYTES # BLD AUTO: 0.5 X10*3/UL (ref 0.1–1)
MONOCYTES NFR BLD AUTO: 7.4 %
MUCOUS THREADS #/AREA URNS AUTO: NORMAL /LPF
NEUTROPHILS # BLD AUTO: 4.84 X10*3/UL (ref 1.2–7.7)
NEUTROPHILS NFR BLD AUTO: 72 %
NITRITE UR QL STRIP.AUTO: NEGATIVE
NRBC BLD-RTO: 0 /100 WBCS (ref 0–0)
PH UR STRIP.AUTO: 5.5 [PH]
PHOSPHATE SERPL-MCNC: 3.5 MG/DL (ref 2.5–4.9)
PLATELET # BLD AUTO: 205 X10*3/UL (ref 150–450)
POTASSIUM SERPL-SCNC: 4.4 MMOL/L (ref 3.5–5.3)
PROT SERPL-MCNC: 7.1 G/DL (ref 6.4–8.2)
PROT UR STRIP.AUTO-MCNC: ABNORMAL MG/DL
RBC # BLD AUTO: 5.27 X10*6/UL (ref 4–5.2)
RBC # UR STRIP.AUTO: NEGATIVE /UL
RBC #/AREA URNS AUTO: NORMAL /HPF
SODIUM SERPL-SCNC: 138 MMOL/L (ref 136–145)
SP GR UR STRIP.AUTO: >1.03
SQUAMOUS #/AREA URNS AUTO: NORMAL /HPF
UROBILINOGEN UR STRIP.AUTO-MCNC: ABNORMAL MG/DL
WBC # BLD AUTO: 6.7 X10*3/UL (ref 4.4–11.3)
WBC #/AREA URNS AUTO: NORMAL /HPF

## 2024-06-06 PROCEDURE — 81003 URINALYSIS AUTO W/O SCOPE: CPT | Performed by: NURSE PRACTITIONER

## 2024-06-06 PROCEDURE — 93005 ELECTROCARDIOGRAM TRACING: CPT

## 2024-06-06 PROCEDURE — 83735 ASSAY OF MAGNESIUM: CPT | Performed by: STUDENT IN AN ORGANIZED HEALTH CARE EDUCATION/TRAINING PROGRAM

## 2024-06-06 PROCEDURE — 85025 COMPLETE CBC W/AUTO DIFF WBC: CPT | Performed by: NURSE PRACTITIONER

## 2024-06-06 PROCEDURE — 36415 COLL VENOUS BLD VENIPUNCTURE: CPT | Performed by: NURSE PRACTITIONER

## 2024-06-06 PROCEDURE — 99285 EMERGENCY DEPT VISIT HI MDM: CPT | Mod: 25

## 2024-06-06 PROCEDURE — 74177 CT ABD & PELVIS W/CONTRAST: CPT

## 2024-06-06 PROCEDURE — 96365 THER/PROPH/DIAG IV INF INIT: CPT

## 2024-06-06 PROCEDURE — 83690 ASSAY OF LIPASE: CPT | Performed by: NURSE PRACTITIONER

## 2024-06-06 PROCEDURE — 80053 COMPREHEN METABOLIC PANEL: CPT | Performed by: NURSE PRACTITIONER

## 2024-06-06 PROCEDURE — 2500000004 HC RX 250 GENERAL PHARMACY W/ HCPCS (ALT 636 FOR OP/ED): Performed by: NURSE PRACTITIONER

## 2024-06-06 PROCEDURE — 83605 ASSAY OF LACTIC ACID: CPT | Performed by: NURSE PRACTITIONER

## 2024-06-06 PROCEDURE — C9113 INJ PANTOPRAZOLE SODIUM, VIA: HCPCS | Performed by: NURSE PRACTITIONER

## 2024-06-06 PROCEDURE — 2500000004 HC RX 250 GENERAL PHARMACY W/ HCPCS (ALT 636 FOR OP/ED): Performed by: STUDENT IN AN ORGANIZED HEALTH CARE EDUCATION/TRAINING PROGRAM

## 2024-06-06 PROCEDURE — 2550000001 HC RX 255 CONTRASTS: Performed by: STUDENT IN AN ORGANIZED HEALTH CARE EDUCATION/TRAINING PROGRAM

## 2024-06-06 PROCEDURE — 96361 HYDRATE IV INFUSION ADD-ON: CPT

## 2024-06-06 PROCEDURE — 84484 ASSAY OF TROPONIN QUANT: CPT | Performed by: NURSE PRACTITIONER

## 2024-06-06 PROCEDURE — 74177 CT ABD & PELVIS W/CONTRAST: CPT | Performed by: STUDENT IN AN ORGANIZED HEALTH CARE EDUCATION/TRAINING PROGRAM

## 2024-06-06 PROCEDURE — 96375 TX/PRO/DX INJ NEW DRUG ADDON: CPT

## 2024-06-06 PROCEDURE — 84100 ASSAY OF PHOSPHORUS: CPT | Performed by: STUDENT IN AN ORGANIZED HEALTH CARE EDUCATION/TRAINING PROGRAM

## 2024-06-06 RX ORDER — HYDROMORPHONE HYDROCHLORIDE 1 MG/ML
1 INJECTION, SOLUTION INTRAMUSCULAR; INTRAVENOUS; SUBCUTANEOUS ONCE
Status: COMPLETED | OUTPATIENT
Start: 2024-06-06 | End: 2024-06-06

## 2024-06-06 RX ORDER — PANTOPRAZOLE SODIUM 40 MG/10ML
40 INJECTION, POWDER, LYOPHILIZED, FOR SOLUTION INTRAVENOUS ONCE
Status: COMPLETED | OUTPATIENT
Start: 2024-06-06 | End: 2024-06-06

## 2024-06-06 RX ORDER — ONDANSETRON HYDROCHLORIDE 2 MG/ML
4 INJECTION, SOLUTION INTRAVENOUS ONCE
Status: COMPLETED | OUTPATIENT
Start: 2024-06-06 | End: 2024-06-06

## 2024-06-06 RX ORDER — MORPHINE SULFATE 4 MG/ML
4 INJECTION INTRAVENOUS ONCE
Status: COMPLETED | OUTPATIENT
Start: 2024-06-06 | End: 2024-06-06

## 2024-06-06 RX ORDER — MAGNESIUM SULFATE HEPTAHYDRATE 40 MG/ML
2 INJECTION, SOLUTION INTRAVENOUS ONCE
Status: COMPLETED | OUTPATIENT
Start: 2024-06-06 | End: 2024-06-07

## 2024-06-06 RX ADMIN — PANTOPRAZOLE SODIUM 40 MG: 40 INJECTION, POWDER, FOR SOLUTION INTRAVENOUS at 20:34

## 2024-06-06 RX ADMIN — MAGNESIUM SULFATE HEPTAHYDRATE 2 G: 2 INJECTION, SOLUTION INTRAVENOUS at 23:03

## 2024-06-06 RX ADMIN — MORPHINE SULFATE 4 MG: 4 INJECTION INTRAVENOUS at 20:34

## 2024-06-06 RX ADMIN — SODIUM CHLORIDE 1000 ML: 9 INJECTION, SOLUTION INTRAVENOUS at 20:34

## 2024-06-06 RX ADMIN — ONDANSETRON 4 MG: 2 INJECTION INTRAMUSCULAR; INTRAVENOUS at 20:34

## 2024-06-06 RX ADMIN — IOHEXOL 75 ML: 350 INJECTION, SOLUTION INTRAVENOUS at 21:31

## 2024-06-06 RX ADMIN — HYDROMORPHONE HYDROCHLORIDE 1 MG: 1 INJECTION, SOLUTION INTRAMUSCULAR; INTRAVENOUS; SUBCUTANEOUS at 22:00

## 2024-06-06 ASSESSMENT — PAIN DESCRIPTION - ONSET: ONSET: GRADUAL

## 2024-06-06 ASSESSMENT — PAIN - FUNCTIONAL ASSESSMENT
PAIN_FUNCTIONAL_ASSESSMENT: 0-10
PAIN_FUNCTIONAL_ASSESSMENT: 0-10

## 2024-06-06 ASSESSMENT — PAIN DESCRIPTION - DESCRIPTORS
DESCRIPTORS: MISERABLE;SORE
DESCRIPTORS: SORE;DISCOMFORT;ACHING

## 2024-06-06 ASSESSMENT — PAIN SCALES - GENERAL
PAINLEVEL_OUTOF10: 8
PAINLEVEL_OUTOF10: 0 - NO PAIN
PAINLEVEL_OUTOF10: 8
PAINLEVEL_OUTOF10: 10 - WORST POSSIBLE PAIN

## 2024-06-06 ASSESSMENT — PAIN DESCRIPTION - LOCATION: LOCATION: ABDOMEN

## 2024-06-06 ASSESSMENT — PAIN DESCRIPTION - FREQUENCY: FREQUENCY: CONSTANT/CONTINUOUS

## 2024-06-06 ASSESSMENT — PAIN DESCRIPTION - PAIN TYPE: TYPE: ACUTE PAIN

## 2024-06-06 ASSESSMENT — PAIN DESCRIPTION - ORIENTATION: ORIENTATION: LEFT

## 2024-06-07 PROBLEM — K52.9 ENTEROCOLITIS: Status: ACTIVE | Noted: 2024-06-07

## 2024-06-07 LAB
ALBUMIN SERPL BCP-MCNC: 3.5 G/DL (ref 3.4–5)
ALBUMIN SERPL BCP-MCNC: 3.5 G/DL (ref 3.4–5)
ALP SERPL-CCNC: 114 U/L (ref 33–110)
ALT SERPL W P-5'-P-CCNC: 414 U/L (ref 7–45)
ANION GAP SERPL CALC-SCNC: 10 MMOL/L (ref 10–20)
ANION GAP SERPL CALC-SCNC: 13 MMOL/L (ref 10–20)
AST SERPL W P-5'-P-CCNC: 465 U/L (ref 9–39)
BILIRUB SERPL-MCNC: 0.9 MG/DL (ref 0–1.2)
BUN SERPL-MCNC: 18 MG/DL (ref 6–23)
BUN SERPL-MCNC: 18 MG/DL (ref 6–23)
CALCIUM SERPL-MCNC: 8 MG/DL (ref 8.6–10.3)
CALCIUM SERPL-MCNC: 8 MG/DL (ref 8.6–10.3)
CHLORIDE SERPL-SCNC: 105 MMOL/L (ref 98–107)
CHLORIDE SERPL-SCNC: 106 MMOL/L (ref 98–107)
CO2 SERPL-SCNC: 23 MMOL/L (ref 21–32)
CO2 SERPL-SCNC: 24 MMOL/L (ref 21–32)
CREAT SERPL-MCNC: 0.75 MG/DL (ref 0.5–1.05)
CREAT SERPL-MCNC: 0.75 MG/DL (ref 0.5–1.05)
CRP SERPL-MCNC: 1.98 MG/DL
EGFRCR SERPLBLD CKD-EPI 2021: >90 ML/MIN/1.73M*2
EGFRCR SERPLBLD CKD-EPI 2021: >90 ML/MIN/1.73M*2
ERYTHROCYTE [DISTWIDTH] IN BLOOD BY AUTOMATED COUNT: 12 % (ref 11.5–14.5)
ERYTHROCYTE [SEDIMENTATION RATE] IN BLOOD BY WESTERGREN METHOD: 2 MM/H (ref 0–30)
FERRITIN SERPL-MCNC: 246 NG/ML (ref 8–150)
GLUCOSE BLD MANUAL STRIP-MCNC: 147 MG/DL (ref 74–99)
GLUCOSE BLD MANUAL STRIP-MCNC: 149 MG/DL (ref 74–99)
GLUCOSE BLD MANUAL STRIP-MCNC: 190 MG/DL (ref 74–99)
GLUCOSE SERPL-MCNC: 198 MG/DL (ref 74–99)
GLUCOSE SERPL-MCNC: 201 MG/DL (ref 74–99)
HAV IGM SER QL: NONREACTIVE
HBV CORE IGM SER QL: NONREACTIVE
HBV SURFACE AG SERPL QL IA: NONREACTIVE
HCT VFR BLD AUTO: 45 % (ref 36–46)
HCV AB SER QL: NONREACTIVE
HGB BLD-MCNC: 14.4 G/DL (ref 12–16)
IRON SATN MFR SERPL: 10 % (ref 25–45)
IRON SERPL-MCNC: 27 UG/DL (ref 35–150)
MAGNESIUM SERPL-MCNC: 1.98 MG/DL (ref 1.6–2.4)
MCH RBC QN AUTO: 30.8 PG (ref 26–34)
MCHC RBC AUTO-ENTMCNC: 32 G/DL (ref 32–36)
MCV RBC AUTO: 96 FL (ref 80–100)
NRBC BLD-RTO: 0 /100 WBCS (ref 0–0)
PHOSPHATE SERPL-MCNC: 3.9 MG/DL (ref 2.5–4.9)
PHOSPHATE SERPL-MCNC: 4.3 MG/DL (ref 2.5–4.9)
PLATELET # BLD AUTO: 160 X10*3/UL (ref 150–450)
POTASSIUM SERPL-SCNC: 3.9 MMOL/L (ref 3.5–5.3)
POTASSIUM SERPL-SCNC: 4.5 MMOL/L (ref 3.5–5.3)
PROT SERPL-MCNC: 5.7 G/DL (ref 6.4–8.2)
RBC # BLD AUTO: 4.67 X10*6/UL (ref 4–5.2)
SODIUM SERPL-SCNC: 135 MMOL/L (ref 136–145)
SODIUM SERPL-SCNC: 137 MMOL/L (ref 136–145)
TIBC SERPL-MCNC: 273 UG/DL (ref 240–445)
TSH SERPL-ACNC: 2.27 MIU/L (ref 0.44–3.98)
UIBC SERPL-MCNC: 246 UG/DL (ref 110–370)
WBC # BLD AUTO: 5.6 X10*3/UL (ref 4.4–11.3)

## 2024-06-07 PROCEDURE — G0378 HOSPITAL OBSERVATION PER HR: HCPCS

## 2024-06-07 PROCEDURE — 80053 COMPREHEN METABOLIC PANEL: CPT

## 2024-06-07 PROCEDURE — 86381 MITOCHONDRIAL ANTIBODY EACH: CPT | Mod: GEALAB

## 2024-06-07 PROCEDURE — 83516 IMMUNOASSAY NONANTIBODY: CPT | Mod: GEALAB

## 2024-06-07 PROCEDURE — 83993 ASSAY FOR CALPROTECTIN FECAL: CPT

## 2024-06-07 PROCEDURE — 2500000004 HC RX 250 GENERAL PHARMACY W/ HCPCS (ALT 636 FOR OP/ED)

## 2024-06-07 PROCEDURE — 87493 C DIFF AMPLIFIED PROBE: CPT | Mod: GEALAB | Performed by: STUDENT IN AN ORGANIZED HEALTH CARE EDUCATION/TRAINING PROGRAM

## 2024-06-07 PROCEDURE — 2500000004 HC RX 250 GENERAL PHARMACY W/ HCPCS (ALT 636 FOR OP/ED): Performed by: STUDENT IN AN ORGANIZED HEALTH CARE EDUCATION/TRAINING PROGRAM

## 2024-06-07 PROCEDURE — 36415 COLL VENOUS BLD VENIPUNCTURE: CPT | Performed by: STUDENT IN AN ORGANIZED HEALTH CARE EDUCATION/TRAINING PROGRAM

## 2024-06-07 PROCEDURE — 80069 RENAL FUNCTION PANEL: CPT | Mod: CCI

## 2024-06-07 PROCEDURE — 83540 ASSAY OF IRON: CPT

## 2024-06-07 PROCEDURE — 86705 HEP B CORE ANTIBODY IGM: CPT | Mod: GEALAB | Performed by: STUDENT IN AN ORGANIZED HEALTH CARE EDUCATION/TRAINING PROGRAM

## 2024-06-07 PROCEDURE — 96361 HYDRATE IV INFUSION ADD-ON: CPT

## 2024-06-07 PROCEDURE — 86015 ACTIN ANTIBODY EACH: CPT | Mod: GEALAB

## 2024-06-07 PROCEDURE — 83735 ASSAY OF MAGNESIUM: CPT

## 2024-06-07 PROCEDURE — 96372 THER/PROPH/DIAG INJ SC/IM: CPT

## 2024-06-07 PROCEDURE — 84100 ASSAY OF PHOSPHORUS: CPT

## 2024-06-07 PROCEDURE — 96366 THER/PROPH/DIAG IV INF ADDON: CPT

## 2024-06-07 PROCEDURE — 99223 1ST HOSP IP/OBS HIGH 75: CPT | Performed by: STUDENT IN AN ORGANIZED HEALTH CARE EDUCATION/TRAINING PROGRAM

## 2024-06-07 PROCEDURE — 86140 C-REACTIVE PROTEIN: CPT

## 2024-06-07 PROCEDURE — 96375 TX/PRO/DX INJ NEW DRUG ADDON: CPT

## 2024-06-07 PROCEDURE — 96376 TX/PRO/DX INJ SAME DRUG ADON: CPT

## 2024-06-07 PROCEDURE — 87506 IADNA-DNA/RNA PROBE TQ 6-11: CPT | Mod: GEALAB | Performed by: STUDENT IN AN ORGANIZED HEALTH CARE EDUCATION/TRAINING PROGRAM

## 2024-06-07 PROCEDURE — 82947 ASSAY GLUCOSE BLOOD QUANT: CPT

## 2024-06-07 PROCEDURE — 84443 ASSAY THYROID STIM HORMONE: CPT

## 2024-06-07 PROCEDURE — 2500000001 HC RX 250 WO HCPCS SELF ADMINISTERED DRUGS (ALT 637 FOR MEDICARE OP)

## 2024-06-07 PROCEDURE — 36415 COLL VENOUS BLD VENIPUNCTURE: CPT

## 2024-06-07 PROCEDURE — 85652 RBC SED RATE AUTOMATED: CPT

## 2024-06-07 PROCEDURE — 87040 BLOOD CULTURE FOR BACTERIA: CPT | Mod: GEALAB

## 2024-06-07 PROCEDURE — 85027 COMPLETE CBC AUTOMATED: CPT

## 2024-06-07 PROCEDURE — 82728 ASSAY OF FERRITIN: CPT

## 2024-06-07 RX ORDER — ROPINIROLE 0.25 MG/1
0.5 TABLET, FILM COATED ORAL NIGHTLY
Status: DISCONTINUED | OUTPATIENT
Start: 2024-06-07 | End: 2024-06-09 | Stop reason: HOSPADM

## 2024-06-07 RX ORDER — SODIUM CHLORIDE 9 MG/ML
75 INJECTION, SOLUTION INTRAVENOUS CONTINUOUS
Status: ACTIVE | OUTPATIENT
Start: 2024-06-07 | End: 2024-06-07

## 2024-06-07 RX ORDER — KETOROLAC TROMETHAMINE 15 MG/ML
15 INJECTION, SOLUTION INTRAMUSCULAR; INTRAVENOUS ONCE
Status: COMPLETED | OUTPATIENT
Start: 2024-06-07 | End: 2024-06-07

## 2024-06-07 RX ORDER — ESCITALOPRAM OXALATE 20 MG/1
20 TABLET ORAL DAILY
Status: DISCONTINUED | OUTPATIENT
Start: 2024-06-07 | End: 2024-06-09 | Stop reason: HOSPADM

## 2024-06-07 RX ORDER — DEXTROSE 50 % IN WATER (D50W) INTRAVENOUS SYRINGE
12.5
Status: DISCONTINUED | OUTPATIENT
Start: 2024-06-07 | End: 2024-06-09 | Stop reason: HOSPADM

## 2024-06-07 RX ORDER — ACETAMINOPHEN 325 MG/1
325 TABLET ORAL EVERY 6 HOURS PRN
COMMUNITY

## 2024-06-07 RX ORDER — PROCHLORPERAZINE EDISYLATE 5 MG/ML
10 INJECTION INTRAMUSCULAR; INTRAVENOUS EVERY 6 HOURS PRN
Status: DISCONTINUED | OUTPATIENT
Start: 2024-06-07 | End: 2024-06-09 | Stop reason: HOSPADM

## 2024-06-07 RX ORDER — INSULIN LISPRO 100 [IU]/ML
0-5 INJECTION, SOLUTION INTRAVENOUS; SUBCUTANEOUS
Status: DISCONTINUED | OUTPATIENT
Start: 2024-06-07 | End: 2024-06-09 | Stop reason: HOSPADM

## 2024-06-07 RX ORDER — ENOXAPARIN SODIUM 100 MG/ML
40 INJECTION SUBCUTANEOUS EVERY 24 HOURS
Status: DISCONTINUED | OUTPATIENT
Start: 2024-06-07 | End: 2024-06-09 | Stop reason: HOSPADM

## 2024-06-07 RX ORDER — KETOROLAC TROMETHAMINE 15 MG/ML
15 INJECTION, SOLUTION INTRAMUSCULAR; INTRAVENOUS EVERY 6 HOURS PRN
Status: DISCONTINUED | OUTPATIENT
Start: 2024-06-07 | End: 2024-06-07

## 2024-06-07 RX ORDER — BUSPIRONE HYDROCHLORIDE 15 MG/1
15 TABLET ORAL 3 TIMES DAILY
Status: DISCONTINUED | OUTPATIENT
Start: 2024-06-07 | End: 2024-06-09 | Stop reason: HOSPADM

## 2024-06-07 RX ORDER — METFORMIN HYDROCHLORIDE 500 MG/1
500 TABLET ORAL
Status: DISCONTINUED | OUTPATIENT
Start: 2024-06-07 | End: 2024-06-09 | Stop reason: HOSPADM

## 2024-06-07 RX ORDER — DEXTROSE 50 % IN WATER (D50W) INTRAVENOUS SYRINGE
25
Status: DISCONTINUED | OUTPATIENT
Start: 2024-06-07 | End: 2024-06-09 | Stop reason: HOSPADM

## 2024-06-07 RX ORDER — DICYCLOMINE HYDROCHLORIDE 10 MG/1
10 CAPSULE ORAL 4 TIMES DAILY PRN
Status: DISCONTINUED | OUTPATIENT
Start: 2024-06-07 | End: 2024-06-09 | Stop reason: HOSPADM

## 2024-06-07 RX ORDER — PROCHLORPERAZINE MALEATE 5 MG
10 TABLET ORAL EVERY 6 HOURS PRN
Status: DISCONTINUED | OUTPATIENT
Start: 2024-06-07 | End: 2024-06-09 | Stop reason: HOSPADM

## 2024-06-07 RX ORDER — PROCHLORPERAZINE 25 MG/1
25 SUPPOSITORY RECTAL EVERY 12 HOURS PRN
Status: DISCONTINUED | OUTPATIENT
Start: 2024-06-07 | End: 2024-06-09 | Stop reason: HOSPADM

## 2024-06-07 RX ORDER — ONDANSETRON HYDROCHLORIDE 2 MG/ML
4 INJECTION, SOLUTION INTRAVENOUS EVERY 6 HOURS PRN
Status: DISCONTINUED | OUTPATIENT
Start: 2024-06-07 | End: 2024-06-09 | Stop reason: HOSPADM

## 2024-06-07 RX ORDER — OXYCODONE HYDROCHLORIDE 5 MG/1
5 TABLET ORAL EVERY 6 HOURS PRN
Status: DISCONTINUED | OUTPATIENT
Start: 2024-06-07 | End: 2024-06-09 | Stop reason: HOSPADM

## 2024-06-07 RX ADMIN — BUSPIRONE HYDROCHLORIDE 15 MG: 15 TABLET ORAL at 21:46

## 2024-06-07 RX ADMIN — OXYCODONE HYDROCHLORIDE 5 MG: 5 TABLET ORAL at 08:46

## 2024-06-07 RX ADMIN — SODIUM CHLORIDE 75 ML/HR: 9 INJECTION, SOLUTION INTRAVENOUS at 03:45

## 2024-06-07 RX ADMIN — ESCITALOPRAM OXALATE 20 MG: 20 TABLET ORAL at 10:22

## 2024-06-07 RX ADMIN — ONDANSETRON 4 MG: 2 INJECTION INTRAMUSCULAR; INTRAVENOUS at 10:22

## 2024-06-07 RX ADMIN — DICYCLOMINE HYDROCHLORIDE 10 MG: 10 CAPSULE ORAL at 08:46

## 2024-06-07 RX ADMIN — ROPINIROLE HYDROCHLORIDE 0.5 MG: 0.25 TABLET, FILM COATED ORAL at 21:46

## 2024-06-07 RX ADMIN — BUSPIRONE HYDROCHLORIDE 15 MG: 15 TABLET ORAL at 10:22

## 2024-06-07 RX ADMIN — PROCHLORPERAZINE EDISYLATE 10 MG: 5 INJECTION INTRAMUSCULAR; INTRAVENOUS at 11:49

## 2024-06-07 RX ADMIN — KETOROLAC TROMETHAMINE 15 MG: 15 INJECTION, SOLUTION INTRAMUSCULAR; INTRAVENOUS at 02:53

## 2024-06-07 RX ADMIN — ENOXAPARIN SODIUM 40 MG: 40 INJECTION SUBCUTANEOUS at 08:46

## 2024-06-07 RX ADMIN — BUSPIRONE HYDROCHLORIDE 15 MG: 15 TABLET ORAL at 15:00

## 2024-06-07 RX ADMIN — KETOROLAC TROMETHAMINE 15 MG: 15 INJECTION, SOLUTION INTRAMUSCULAR; INTRAVENOUS at 00:15

## 2024-06-07 SDOH — SOCIAL STABILITY: SOCIAL INSECURITY: HAVE YOU HAD THOUGHTS OF HARMING ANYONE ELSE?: YES

## 2024-06-07 SDOH — SOCIAL STABILITY: SOCIAL INSECURITY: ARE THERE ANY APPARENT SIGNS OF INJURIES/BEHAVIORS THAT COULD BE RELATED TO ABUSE/NEGLECT?: NO

## 2024-06-07 SDOH — SOCIAL STABILITY: SOCIAL INSECURITY: WERE YOU ABLE TO COMPLETE ALL THE BEHAVIORAL HEALTH SCREENINGS?: YES

## 2024-06-07 SDOH — SOCIAL STABILITY: SOCIAL INSECURITY: ARE YOU OR HAVE YOU BEEN THREATENED OR ABUSED PHYSICALLY, EMOTIONALLY, OR SEXUALLY BY ANYONE?: NO

## 2024-06-07 SDOH — SOCIAL STABILITY: SOCIAL INSECURITY: ABUSE: ADULT

## 2024-06-07 SDOH — SOCIAL STABILITY: SOCIAL INSECURITY: DO YOU FEEL UNSAFE GOING BACK TO THE PLACE WHERE YOU ARE LIVING?: NO

## 2024-06-07 SDOH — SOCIAL STABILITY: SOCIAL INSECURITY: DO YOU FEEL ANYONE HAS EXPLOITED OR TAKEN ADVANTAGE OF YOU FINANCIALLY OR OF YOUR PERSONAL PROPERTY?: NO

## 2024-06-07 SDOH — SOCIAL STABILITY: SOCIAL INSECURITY: HAVE YOU HAD ANY THOUGHTS OF HARMING ANYONE ELSE?: NO

## 2024-06-07 SDOH — SOCIAL STABILITY: SOCIAL INSECURITY: HAS ANYONE EVER THREATENED TO HURT YOUR FAMILY OR YOUR PETS?: NO

## 2024-06-07 SDOH — SOCIAL STABILITY: SOCIAL INSECURITY: DOES ANYONE TRY TO KEEP YOU FROM HAVING/CONTACTING OTHER FRIENDS OR DOING THINGS OUTSIDE YOUR HOME?: NO

## 2024-06-07 ASSESSMENT — COGNITIVE AND FUNCTIONAL STATUS - GENERAL
DAILY ACTIVITIY SCORE: 24
PATIENT BASELINE BEDBOUND: NO
DAILY ACTIVITIY SCORE: 24
DAILY ACTIVITIY SCORE: 24
MOBILITY SCORE: 24
MOBILITY SCORE: 24

## 2024-06-07 ASSESSMENT — ACTIVITIES OF DAILY LIVING (ADL)
FEEDING YOURSELF: INDEPENDENT
WALKS IN HOME: INDEPENDENT
HEARING - RIGHT EAR: FUNCTIONAL
JUDGMENT_ADEQUATE_SAFELY_COMPLETE_DAILY_ACTIVITIES: YES
HEARING - LEFT EAR: FUNCTIONAL
DRESSING YOURSELF: INDEPENDENT
BATHING: INDEPENDENT
PATIENT'S MEMORY ADEQUATE TO SAFELY COMPLETE DAILY ACTIVITIES?: YES
ADEQUATE_TO_COMPLETE_ADL: YES
GROOMING: INDEPENDENT
TOILETING: INDEPENDENT
LACK_OF_TRANSPORTATION: NO
LACK_OF_TRANSPORTATION: NO

## 2024-06-07 ASSESSMENT — LIFESTYLE VARIABLES
PRESCIPTION_ABUSE_PAST_12_MONTHS: NO
HOW MANY STANDARD DRINKS CONTAINING ALCOHOL DO YOU HAVE ON A TYPICAL DAY: PATIENT DOES NOT DRINK
AUDIT-C TOTAL SCORE: 0
AUDIT-C TOTAL SCORE: 0
SUBSTANCE_ABUSE_PAST_12_MONTHS: NO
HOW OFTEN DO YOU HAVE A DRINK CONTAINING ALCOHOL: NEVER
SKIP TO QUESTIONS 9-10: 1
HOW OFTEN DO YOU HAVE 6 OR MORE DRINKS ON ONE OCCASION: NEVER

## 2024-06-07 ASSESSMENT — PAIN DESCRIPTION - PAIN TYPE: TYPE: ACUTE PAIN

## 2024-06-07 ASSESSMENT — PATIENT HEALTH QUESTIONNAIRE - PHQ9
2. FEELING DOWN, DEPRESSED OR HOPELESS: NOT AT ALL
1. LITTLE INTEREST OR PLEASURE IN DOING THINGS: NOT AT ALL
SUM OF ALL RESPONSES TO PHQ9 QUESTIONS 1 & 2: 0

## 2024-06-07 ASSESSMENT — PAIN SCALES - GENERAL
PAINLEVEL_OUTOF10: 10 - WORST POSSIBLE PAIN
PAINLEVEL_OUTOF10: 2
PAINLEVEL_OUTOF10: 1
PAINLEVEL_OUTOF10: 0 - NO PAIN

## 2024-06-07 ASSESSMENT — PAIN - FUNCTIONAL ASSESSMENT: PAIN_FUNCTIONAL_ASSESSMENT: 0-10

## 2024-06-07 NOTE — CARE PLAN
The patient's goals for the shift include  remain comfortable    The clinical goals for the shift include maintain safety      Problem: Pain  Goal: My pain/discomfort is manageable  Outcome: Progressing     Problem: Safety  Goal: I will remain free of falls  Outcome: Progressing

## 2024-06-07 NOTE — H&P
Internal Medicine:  Initial History and Physical Note                                                 Patient: Razia Gunn, Age: 54 y.o., SEX: female, MRN:[id number], ROOM:[facility]                                                                              Code: Full Code                                                               Admitted On:  6/6/2024                                                                Admitting Dx: No admission diagnoses are documented for this encounter.                                                                                PCP:  Mary Mena PA-C                                                                            Attending:  Humberto Brady DO       Subjective:     Chief Complaint   Patient presents with    Abdominal Pain     Pt stated started Tuesday and has just been getting worse said it felt heavey and needed to come out and has not gotten better      Diarrhea     Pt states no blood, pt states very watery     Nausea       HPI:     Razia Gunn is a 54 y.o. female with a Medical History of T2DM, anxiety, restless leg syndrome, hyperlipidemia who presented on account of abdominal pain, diarrhea and nausea.      The patient was in her usual state of health until last Tuesday when she started having sudden onset abdominal pain. She reports that the pain started after she used a different technique to inject Ozempic.  Describes pain as sharp, 8 out of 10 intensity located in the epigastric area and radiating to the left lower abdominal quadrant.  There are no aggravating or alleviating factors. There is however an associated history of none bloody loose bowel movement. She report that she has has about 12 bowel movements per day since her symptoms started.  She also endorses subjective fevers and muscle ache.  She reports having generalized malaise, nausea with no vomiting.  She denies a temporal relationship of symptoms to recent intake of food/eating  "out, exposure to sick contacts, recent travel or changes in urinary habit. But says that she has had reduced appetite. Of note she said that there has not been a recent dose change in her routine daily medications and she has been on Ozempic for several years, the only adjust  ment she made was in the technique she uses to give the shots. Also per patients daughter \"puneet\" who was also at bedside, the patient has had two prior episodes of colitis in February and March of this year respectively.     ED course:   Vital signs; T 37.9, , RR 20, spo2 94 on RA     Labs:   Cbc: 6.7, HB 16.7, MCV 91, plt  205  CMP: , , Tb 1.3, mg 1.51, lactate 1.3, lipase 39  UA, Le 75, otherwise unremarkable     Imaging:   Multiple fluid filled nondilated small bowel and colonic loops,nonspecific, but can be seen in setting of infectious enterocolitis. Hepatomegaly and diffuse steatosis.    Micro stool pathogen pcr  Intervention; IV mg 2g, c diff, Dialudid 1mg , ketorolac 15mg,morphine 4mg , ondanserton 4mg pantoprazole 40mg      ROS: 12 points review of system is negative except as stated in the HPI above.      Past Medical History:    Diabetes, RLS, Anxiety and HLD     Allergies   Allergen Reactions    Ceftriaxone Itching    Dapagliflozin Unknown        Meds: (Not in a hospital admission)      Past surgical history:      Past Surgical History:   Procedure Laterality Date    CHOLECYSTECTOMY  05/28/2013    Cholecystectomy    TOTAL KNEE ARTHROPLASTY Right     TOTAL VAGINAL HYSTERECTOMY  01/23/2017    Vaginal Hysterectomy    TUBAL LIGATION  05/28/2013    Tubal Ligation        Social history:      Social History     Socioeconomic History    Marital status:      Spouse name: Not on file    Number of children: Not on file    Years of education: Not on file    Highest education level: Not on file   Occupational History    Not on file   Tobacco Use    Smoking status: Never    Smokeless tobacco: Never   Substance and " Sexual Activity    Alcohol use: Never    Drug use: Never    Sexual activity: Not on file   Other Topics Concern    Not on file   Social History Narrative    Not on file     Social Determinants of Health     Financial Resource Strain: Not on file   Food Insecurity: Not on file   Transportation Needs: Not on file   Physical Activity: Not on file   Stress: Not on file   Social Connections: Not on file   Intimate Partner Violence: Not on file   Housing Stability: Not on file        Social Determinants of Health with Concerns     Alcohol Use: Not on file   Financial Resource Strain: Not on file   Food Insecurity: Not on file   Transportation Needs: Not on file   Physical Activity: Not on file   Stress: Not on file   Social Connections: Not on file   Intimate Partner Violence: Not on file   Depression: Not on file   Housing Stability: Not on file   Utilities: Not on file   Digital Equity: Not on file   Health Literacy: Not on file        Current medications:      Current Facility-Administered Medications:     dextrose 50 % injection 12.5 g, 12.5 g, intravenous, q15 min PRN, Mookie Elena MD    dextrose 50 % injection 25 g, 25 g, intravenous, q15 min PRN, Mookie Elena MD    enoxaparin (Lovenox) syringe 40 mg, 40 mg, subcutaneous, q24h, Mookie Elena MD    glucagon (Glucagen) injection 1 mg, 1 mg, intramuscular, q15 min PRN, Mookie Elena MD    glucagon (Glucagen) injection 1 mg, 1 mg, intramuscular, q15 min PRN, Mookie Elena MD    insulin lispro (HumaLOG) injection 0-5 Units, 0-5 Units, subcutaneous, TID, Mookie Elena MD    ketorolac (Toradol) injection 15 mg, 15 mg, intravenous, q6h PRN, Mookie Elena MD, 15 mg at 06/07/24 0253    losartan (Cozaar) tablet 50 mg, 50 mg, oral, Once, Sarabjit Cohen DO    sodium chloride 0.9% infusion, 75 mL/hr, intravenous, Continuous, Mookie Elena MD    Current Outpatient Medications:     busPIRone (Buspar) 15 mg tablet, Take 1 tablet (15 mg) by  "mouth in the morning and 1 tablet (15 mg) in the evening and 1 tablet (15 mg) before bedtime., Disp: , Rfl:     dicyclomine (Bentyl) 20 mg tablet, Take 0.5 tablets (10 mg) by mouth 4 times a day as needed (abdominal pain)., Disp: 30 tablet, Rfl: 0    escitalopram (Lexapro) 20 mg tablet, Take 1 tablet (20 mg) by mouth once daily., Disp: , Rfl:     estradiol (Estrace) 0.01 % (0.1 mg/gram) vaginal cream, Insert 20 Applications into the vagina once daily. Apply to vagina nightly for 1 week then every Monday/Wednesday/Friday., Disp: 42.5 g, Rfl: 5    hydrOXYzine pamoate (Vistaril) 50 mg capsule, Take 1 capsule (50 mg) by mouth as needed at bedtime., Disp: , Rfl:     metFORMIN (Glucophage) 500 mg tablet, TAKE 1 TABLET BY MOUTH WITH WITH LUNCH AND SUPPER, Disp: 180 tablet, Rfl: 3    rOPINIRole (Requip) 0.5 mg tablet, Take 1 tablet (0.5 mg) by mouth once daily at bedtime., Disp: 90 tablet, Rfl: 3    semaglutide (Ozempic) 0.25 mg or 0.5 mg(2 mg/1.5 mL) pen injector, Inject 0.25 mg once a week for 4 weeks then increase to 0.5 mg weekly thereafter, Disp: 1 each, Rfl: 1    semaglutide (Ozempic) 1 mg/dose (2 mg/1.5 mL) pen injector, Inject 1 mg under the skin 1 (one) time per week., Disp: 3 each, Rfl: 3      Objective:    Vitals: Blood pressure 120/74, pulse 83, temperature 37.9 °C (100.2 °F), temperature source Skin, resp. rate (!) 22, height 1.676 m (5' 6\"), weight 98.9 kg (218 lb), SpO2 96%.   I/Os:   Intake/Output Summary (Last 24 hours) at 6/7/2024 0343  Last data filed at 6/6/2024 2211  Gross per 24 hour   Intake 1000 ml   Output --   Net 1000 ml       FiO2 (%):  [21 %-28 %] 28 %     Physical Examination:  GE: lying comfortable in bed, in mild painful distress, obese looking  HEENT; AT/NC, EOMI, no conjunctival pallor, anicteric sclera, dry mucous membrane  Neck; supple neck, no LAD/JVD  Chest; Good air entry b/l, no adventitious sounds heard, normal rise and fall of thoracis cavity during each respiratory cycle.  CVS; s1 " "and s2, no MGR, RRR  Abd; soft, non distended, tenderness to palpation in the epigastric area, and the Left abdominal quadrant. No guarding/rebound tenderness. Neg. shifting dullness, no palpable organomegaly, protuberant abomen.   Ext; no pedal edema/cyanosis/clubbing, pulses intact  Neuro; Aox3, Cn 2-12 intact, sensation intact, Motor 5/5 globally  Psych; normal mood/affect.        Laboratory:      Laboratory results:  Results from last 72 hours   Lab Units 06/06/24 2017   SODIUM mmol/L 138   POTASSIUM mmol/L 4.4   CHLORIDE mmol/L 105   CO2 mmol/L 22   BUN mg/dL 17   CREATININE mg/dL 0.89   GLUCOSE mg/dL 198*   CALCIUM mg/dL 9.2   ANION GAP mmol/L 15   EGFR mL/min/1.73m*2 78   PHOSPHORUS mg/dL 3.5      Results from last 72 hours   Lab Units 06/06/24 2017   WBC AUTO x10*3/uL 6.7   HEMOGLOBIN g/dL 16.7*   HEMATOCRIT % 48.0*   MCV fL 91   PLATELETS AUTO x10*3/uL 205   NEUTROS PCT AUTO % 72.0   LYMPHS PCT AUTO % 18.3   MONOS PCT AUTO % 7.4   EOS PCT AUTO % 1.6        Other laboratory finding:   Lab Results   Component Value Date    CALCIUM 9.2 06/06/2024      Results from last 72 hours   Lab Units 06/06/24 2017   TROPHS ng/L <3     No results found for: \"CRP\", \"BNP\"     Imaging:   CT abdomen pelvis w IV contrast  Narrative: Interpreted By:  Carlos White,   STUDY:  CT ABDOMEN PELVIS W IV CONTRAST;  6/6/2024 9:30 pm      INDICATION:  Signs/Symptoms:acute 10/10 LUQ pain.      COMPARISON:  CT abdomen and pelvis 10/1923      ACCESSION NUMBER(S):  [id number]      ORDERING CLINICIAN:  JASPREET HOOKS      TECHNIQUE:  CT of the abdomen and pelvis was performed.  Standard contiguous  axial images were obtained at 3 mm slice thickness through the  abdomen and pelvis. Coronal and sagittal reconstructions at 3 mm  slice thickness were performed.      75 ml of contrast Omnipaque 350 were administered intravenously  without immediate complication.      FINDINGS:  LOWER CHEST:  The visualized lung base is unremarkable. The " heart is normal in size  without pericardial effusion. No pleural effusion is present.  Visualized distal esophagus appears normal.      ABDOMEN:      LIVER:  The liver is enlarged measuring 22.4 cm in craniocaudal dimension and  demonstrates diffusely decreased attenuation suggesting steatosis. No  focal lesion.      BILE DUCTS:  The intrahepatic and extrahepatic ducts are not dilated.      GALLBLADDER:  Cholecystectomy.      PANCREAS:  The pancreas appears unremarkable without evidence of ductal  dilatation or masses.      SPLEEN:  The spleen is normal in size without focal lesions.      ADRENAL GLANDS:  Bilateral adrenal glands appear normal.      KIDNEYS AND URETERS:  The kidneys are normal in size and enhance symmetrically. Bilateral  parapelvic cysts, greater on the left. Thinly septated left inferior  pole 6.2 cm simple cyst. No hydroureteronephrosis or  nephroureterolithiasis is identified.      PELVIS:      BLADDER:  The urinary bladder appears normal without abnormal wall thickening.      REPRODUCTIVE ORGANS:  Status post hysterectomy.      BOWEL:  The stomach is unremarkable.   No bowel dilation. Several  fluid-filled small bowel and colonic loops suggestive of infectious  enterocolitis. No significant wall thickening. Normal appendix      VESSELS:  There is no aneurysmal dilatation of the abdominal aorta. The IVC  appears normal.      PERITONEUM/RETROPERITONEUM/LYMPH NODES:  There is no free or loculated fluid collection, no free  intraperitoneal air. The retroperitoneum appears normal.  No  abdominopelvic lymphadenopathy is present.      BONES AND ABDOMINAL WALL:  No suspicious osseous lesions are identified. The abdominal wall soft  tissues appear normal.      Impression: 1.  Multiple fluid filled nondilated small bowel and colonic loops,  nonspecific, but can be seen in setting of infectious enterocolitis.  2. Hepatomegaly and diffuse steatosis.          Signed by: Carlos White 6/6/2024 11:30  "PM  Dictation workstation:   [id number]       Microbiology:   No results found for the last 90 days.    No results found for: \"URINECULTURE\", \"BLOODCULT\", \"CSFCULTSMEAR\"                 No lab exists for component: \"AGALPCRNB\"     Assessment and Plan:    Problem list:   Principal Problem:    Enterocolitis    Assessment and Plan:   Razia Gunn is a 54 y.o. female with a Medical History of T2DM, anxiety, restless leg syndrome, hyperlipidemia who presented on account of abdominal pain, diarrhea and nausea in the setting of use of ozempic. Imaging studies in the ER cf infectious enterocolitis, steatosis and chem showed incr LFTS.     Active Issues:    # Enterocolitis  C diff rule out  - Patient currently taking ozempic, and has had several past episodes of colitis in Feb and march of this year.  - Imaging studies; Multiple fluid filled nondilated small bowel and colonic loops,nonspecific, but can be seen in setting of infectious enterocolitis. Hepatomegaly and diffuse steatosis.  - NPO  - C. Diff PCR  - Stool pathogen PCR  - Stool calprotectin, celiac panel, TSH  - S/p IV pantoprazole 40 mg   - S/p 1L IVF NS in the ED  - continue Ondansetron   - if symptoms persists, consider GI consult, patient may benefit form a bidirectional scope after discharge.   - pain mgt; schedule ketorolac 15 mg      #Transaminitis  :: likely due to dehydration in the setting of recent diarrhea, and reduced PO intake  - Imaging shows Hepatomegaly, steatosis  - CMP: , , Tb 1.3, mg 1.51   - avoid hepatotoxic medications  - Hepatitis panel  - recheck LFT in the AM still incr, consider ordering further work for possible etiology of pathology including: FRANC, smooth muscle antibodies (PRIMO and immunofluorescence), Liver Kidney Microsomal Antibodies (LKM), Soluble Liver Antigen antibodies, total IgG, total IgA, total IgM, AMA, alpha1-antitrypsin, ceruloplasmin, iron, TIBC, ferritin, transferrin saturation, anti-TTG, " anti-endomysial Ab, Antigliadin Ab, CK, AFP, and LDH.   - IVF NS 75cc/hr    # Hypomagnesemia  - Mg of 1.51  - S/p IV mg 2g in the ED  - Will continue to monitor    Chronic Issues:  #Anxiety: buspar 15 mg, Lexapro 29mg (hold, patient is NPO)  #restless leg syndrome: ropinirole 0.5 ( hold patient is NPO)  #Hyperlipidemia: not on medications    #T2DM:  last A1c 8.7, metformin 500 mg, Ozempic 0.25 ( will hold), ISS    F: NS 75cc/hr  E: Replete as needed  N: Regular  G: None  VTE: Lovenox    Code:   Full Code  Extended Emergency Contact Information  Primary Emergency Contact: Gamaliel Gunn  Home Phone: [phone]  Relation: Spouse     Disposition: 54 y.o.female admitted for enterocolitis and transaminitis, will anticipate <48hrs eLOS.     Mookie Elena MD   Internal medicine Resident

## 2024-06-07 NOTE — ED PROVIDER NOTES
HPI   Chief Complaint   Patient presents with    Abdominal Pain     Pt stated started Tuesday and has just been getting worse said it felt heavey and needed to come out and has not gotten better      Diarrhea     Pt states no blood, pt states very watery     Nausea       54-year-old female who had a hysterectomy and is on Ozempic presents today with severe nausea vomiting and left upper quadrant pain since Tuesday.  She states that the pain is under her left ribs.  She denies any history of cancer.  She denies constipation but instead endorses diarrhea since this morning.  The pain is rated 10 out of 10 and she has never experienced such pain.  She was diagnosed with colitis in March and in February.  She endorses fever and chills.  She endorses chest pain but it is also located under the left ribs.  She took Tylenol 2 tablets at 530.  She denies any recent trauma or fall.  She denies history of pulmonary embolism.  She denies dyspnea.  She was tachycardic in triage with a heart rate of 121 bpm and she had a temp of 37.9 Celsius.      History provided by:  Patient and relative (daughter bedside)   used: No                        [place] Coma Scale Score: 15                     Patient History   Past Medical History:   Diagnosis Date    Acute candidiasis of vulva and vagina 01/20/2015    Yeast vaginitis    Acute laryngitis 12/18/2014    Acute laryngitis    Acute maxillary sinusitis, unspecified 03/08/2020    Acute maxillary sinusitis    Candidiasis, unspecified 12/20/2017    Yeast infection    Depression     Diabetes mellitus (Multi)     Other conditions influencing health status     History of burning on urination    Other conditions influencing health status 09/30/2016    Cyst    Other conditions influencing health status 03/06/2014    History of dyspareunia    Other displaced fracture of base of first metacarpal bone, left hand, initial encounter for closed fracture 07/22/2021    Closed  displaced fracture of base of first metacarpal bone of left hand, unspecified fracture morphology, initial encounter    Pelvic and perineal pain 01/23/2017    Pelvic pain in female    Personal history of other benign neoplasm 10/03/2016    History of uterine leiomyoma    Personal history of other diseases of the circulatory system 01/23/2017    History of hypertension    Personal history of other diseases of the musculoskeletal system and connective tissue 12/08/2015    History of low back pain    Personal history of other diseases of the respiratory system 02/24/2020    History of influenza    Personal history of other diseases of the respiratory system 09/26/2018    History of acute bronchitis    Personal history of other diseases of the respiratory system 02/15/2014    Personal history of acute sinusitis    Personal history of other diseases of the respiratory system 04/22/2019    History of acute bronchitis    Personal history of other diseases of the respiratory system 11/21/2017    History of acute sinusitis    Personal history of other diseases of urinary system 08/22/2017    History of cystocele    Personal history of other endocrine, nutritional and metabolic disease 10/16/2014    History of type 2 diabetes mellitus    Personal history of other mental and behavioral disorders 05/28/2013    History of depression    Personal history of other specified conditions 03/25/2014    History of chest pain    Personal history of other specified conditions 08/22/2017    History of dysuria    Personal history of other specified conditions 03/19/2015    History of diarrhea    Personal history of other specified conditions 10/10/2014    History of abdominal pain    Phlebitis and thrombophlebitis of other sites 03/20/2020    Thrombophlebitis arm    Phlebitis and thrombophlebitis of other sites 03/20/2020    Phlebitis of arm    Right lower quadrant pain 11/01/2013    Abdominal pain, RLQ (right lower quadrant)    Unspecified  condition associated with female genital organs and menstrual cycle 12/21/2015    Adnexal cyst    Unspecified symptoms and signs involving the genitourinary system 10/17/2016    UTI symptoms     Past Surgical History:   Procedure Laterality Date    CHOLECYSTECTOMY  05/28/2013    Cholecystectomy    TOTAL KNEE ARTHROPLASTY Right     TOTAL VAGINAL HYSTERECTOMY  01/23/2017    Vaginal Hysterectomy    TUBAL LIGATION  05/28/2013    Tubal Ligation     No family history on file.  Social History     Tobacco Use    Smoking status: Never    Smokeless tobacco: Never   Substance Use Topics    Alcohol use: Never    Drug use: Never       Physical Exam   ED Triage Vitals [06/06/24 1922]   Temperature Heart Rate Respirations BP   37.9 °C (100.2 °F) (!) 121 20 (!) 154/94      Pulse Ox Temp Source Heart Rate Source Patient Position   94 % Skin Monitor Sitting      BP Location FiO2 (%)     Left arm 21 %       Physical Exam  Constitutional:       Appearance: She is obese.   HENT:      Head: Normocephalic and atraumatic.   Eyes:      Extraocular Movements: Extraocular movements intact.      Pupils: Pupils are equal, round, and reactive to light.   Cardiovascular:      Rate and Rhythm: Regular rhythm. Tachycardia present.      Heart sounds: Normal heart sounds.   Pulmonary:      Effort: Pulmonary effort is normal.      Breath sounds: Normal breath sounds.   Abdominal:      General: Abdomen is flat. Bowel sounds are normal.      Palpations: Abdomen is soft.      Tenderness: There is abdominal tenderness in the left upper quadrant.      Hernia: No hernia is present.   Skin:     General: Skin is warm.      Capillary Refill: Capillary refill takes less than 2 seconds.   Neurological:      General: No focal deficit present.      Mental Status: She is alert.   Psychiatric:         Mood and Affect: Mood normal.         Behavior: Behavior normal.         ED Course & MDM   ED Course as of 06/07/24 0015   Thu Jun 06, 2024   4252 54-year-old female  presents emergency department with abdominal pain.  States she is on a milligram of Ozempic but has been on it for years for type 2 diabetes however does not believe she is actually been giving it to herself correctly up until this Monday where she did endorse a subcutaneous injection of the Ozempic.  She endorses diffuse abdominal pain and significant watery diarrhea with no recent antibiotic use.  Patient does have a transaminitis.  She is markedly uncomfortable on reevaluation and states the morphine worked for short period of time but then the pain returned.  Hydrated with a liter of fluid.  Patient does have a history of cholecystectomy.  CT of the abdomen obtained but patient will likely require admission given symptomatic management and transaminitis.   [HD]   2230 MAGNESIUM(!): 1.51  Repleted with 2g IV magnesium  [HD]   Fri Jun 07, 2024   0003 Patient had not received antipyretics for low-grade fever.  Given the transaminitis will avoid acetaminophen.  Given a dose of Toradol. [HD]      ED Course User Index  [HD] Isabel Riley,          Diagnoses as of 06/07/24 0015   Transaminitis   Enterocolitis       Medical Decision Making  Patient received 1 L normal saline, 4 mg morphine, 4 mg Zofran, lactate, lipase, CBC CMP urinalysis ordered.  I was concerned about acute pancreatitis with left upper quadrant pain a CT abdomen pelvis with contrast should be helpful in diagnosing.  Vital signs rechecked a dozen times and stable.  Patient's magnesium was 1.51 and repleted with 2 g magnesium.  Microscopic urine normal.  Urinalysis only at 75 leuks.  Phosphorus was normal at 3.5.  Lipase was normal.  Metabolic panel had transaminase with a AST of 468 and ALT is 239 bilirubin was 1.3.  Lactate was normal.  Troponin was undetectable.  CBC had a hemoglobin hematocrit of 16.7 and 48.  There was no leukocytosis or left shift.  The CT of the abdomen pelvis showed multiple fluid-filled nondilated small bowel and colonic  loops.  This is nonspecific but can be seen in the setting of a infectious enterocolitis.  Hepatomegaly and diffuse steatosis.  Patient was seen and staffed with attending and plan to admit patient to hospital service.    Amount and/or Complexity of Data Reviewed  ECG/medicine tests: ordered and independent interpretation performed.     Details: EKG is sinus tachycardia at 102 bpm.  Normal axis.  QRS is narrow.  P waves tied to the QRS.  No ST elevation or depression.  No right strain appreciated.  Interpreted both by myself and attending.        Procedure  Procedures     Kel Neff, EDER-CNP  06/07/24 0015

## 2024-06-07 NOTE — PROGRESS NOTES
Pharmacy Medication History Review    Razia Gunn is a 54 y.o. female admitted for Enterocolitis. Pharmacy reviewed the patient's [id number] medications and allergies for accuracy.    The list below reflectives the updated PTA list. Please review each medication in order reconciliation for additional clarification and justification.  Prior to Admission Medications   Prescriptions Last Dose Informant Patient Reported? Taking?   CHOLECALCIFEROL, VITAMIN D3, ORAL   Yes No   Sig: Take by mouth once daily.   CRANBERRY ORAL 6/4/2024  Yes No   Sig: Take 1 tablet by mouth once daily.   CYANOCOBALAMIN, VITAMIN B-12, ORAL 6/4/2024  Yes No   Sig: Place 1 tablet under the tongue once daily.   acetaminophen (Tylenol) 325 mg tablet 6/6/2024  Yes Yes   Sig: Take 1 tablet (325 mg) by mouth every 6 hours if needed for mild pain (1 - 3).   busPIRone (Buspar) 15 mg tablet 6/6/2024 at pm  Yes Yes   Sig: Take 1 tablet (15 mg) by mouth 3 times a day.   dicyclomine (Bentyl) 20 mg tablet Not Taking  No No   Sig: Take 0.5 tablets (10 mg) by mouth 4 times a day as needed (abdominal pain).   Patient not taking: Reported on 6/7/2024   escitalopram (Lexapro) 20 mg tablet 6/6/2024 at am  Yes Yes   Sig: Take 1 tablet (20 mg) by mouth once daily.   estradiol (Estrace) 0.01 % (0.1 mg/gram) vaginal cream Not Taking  No No   Sig: Insert 20 Applications into the vagina once daily. Apply to vagina nightly for 1 week then every Monday/Wednesday/Friday.   Patient not taking: Reported on 6/7/2024   hydrOXYzine pamoate (Vistaril) 50 mg capsule 6/5/2024  Yes No   Sig: Take 1 capsule (50 mg) by mouth as needed at bedtime.   metFORMIN (Glucophage) 500 mg tablet 6/5/2024  No No   Sig: TAKE 1 TABLET BY MOUTH WITH WITH LUNCH AND SUPPER   rOPINIRole (Requip) 0.5 mg tablet 6/5/2024  No No   Sig: Take 1 tablet (0.5 mg) by mouth once daily at bedtime.   semaglutide (Ozempic) 0.25 mg or 0.5 mg(2 mg/1.5 mL) pen injector Not Taking  No No   Sig: Inject  0.25 mg once a week for 4 weeks then increase to 0.5 mg weekly thereafter   Patient not taking: Reported on 6/7/2024   semaglutide (Ozempic) 1 mg/dose (2 mg/1.5 mL) pen injector 6/3/2024  No No   Sig: Inject 1 mg under the skin 1 (one) time per week.      Facility-Administered Medications Last Administration Doses Remaining   losartan (Cozaar) tablet 50 mg None recorded 1            The list below reflectives the updated allergy list. Please review each documented allergy for additional clarification and justification.  Allergies  Reviewed by JOSE Sawant on 6/6/2024        Severity Reactions Comments    Ceftriaxone Not Specified Itching     Dapagliflozin Not Specified Unknown             Below are additional concerns with the patient's PTA list.      Nadine Knapp

## 2024-06-07 NOTE — HOSPITAL COURSE
Razia Gunn is a 54 y.o. female with a Medical History of T2DM, anxiety, restless leg syndrome, hyperlipidemia who presented on account of abdominal pain, diarrhea and nausea.         ED course:   Vital signs; T 37.9, , RR 20, spo2 94 on RA      Labs:   Cbc: 6.7, HB 16.7, MCV 91, plt  205  CMP: , , Tb 1.3, mg 1.51, lactate 1.3, lipase 39  UA, Le 75, otherwise unremarkable      Imaging:   Multiple fluid filled nondilated small bowel and colonic loops,nonspecific, but can be seen in setting of infectious enterocolitis. Hepatomegaly and diffuse steatosis.     Micro stool pathogen pcr  Intervention; IV mg 2g, c diff, Dialudid 1mg , ketorolac 15mg,morphine 4mg , ondanserton 4mg pantoprazole 40mg       Floor course  6/7: Enterocolitis work up still pending. C. difficile, calprotectin, stool pathogen still pending. Celiac panel still pending. Differential is broad and includes infection such as C. difficile, E. coli. Additional causes include inflammatory bowel disease, microscopic colitis, ischemic colitis, and drug induced colitis secondary to NSAID, aspirin and/or PPI use.   6/8: Transaminitis improving.  /.  Patient noted to be jaundiced.  Patient was found to have rhinovirus on stool pathogen studies.  6/9: Patient is going to advance her diet and symptomatic management and will be discharged in hemodynamically stable condition.

## 2024-06-07 NOTE — SIGNIFICANT EVENT
Assessment and Plan:   Razia Gunn is a 54 y.o. female with a Medical History of T2DM, anxiety, restless leg syndrome, hyperlipidemia who presented on account of abdominal pain, diarrhea and nausea in the setting of use of ozempic. Imaging studies in the ER cf infectious enterocolitis, steatosis and chem showed incr LFTS.      Active Issues:     # Enterocolitis  C diff rule out  - Patient currently taking ozempic, and has had several past episodes of colitis in Feb and march of this year.  - Imaging studies; Multiple fluid filled nondilated small bowel and colonic loops,nonspecific, but can be seen in setting of infectious enterocolitis. Hepatomegaly and diffuse steatosis.  - NPO  - C. Diff PCR  - Stool pathogen PCR  - Stool calprotectin, celiac panel, TSH  - S/p IV pantoprazole 40 mg   - S/p 1L IVF NS in the ED  - continue Ondansetron   - if symptoms persists, consider GI consult, patient may benefit form a bidirectional scope after discharge.   - pain mgt; schedule ketorolac 15 mg     -6/7: C. difficile, calprotectin, stool pathogen still pending.  Celiac panel still pending.  Differential is broad and includes infection such as C. difficile, E. coli.  Additional causes include inflammatory bowel disease, microscopic colitis, ischemic colitis, and drug induced colitis secondary to NSAID, aspirin and/or PPI use.      #Transaminitis  :: likely due to dehydration in the setting of recent diarrhea, and reduced PO intake  - Imaging shows Hepatomegaly, steatosis  - CMP: , , Tb 1.3, mg 1.51   - avoid hepatotoxic medications  - Hepatitis panel  - recheck LFT in the AM still incr, consider ordering further work for possible etiology of pathology including: FRANC, smooth muscle antibodies (PRIMO and immunofluorescence), Liver Kidney Microsomal Antibodies (LKM), Soluble Liver Antigen antibodies, total IgG, total IgA, total IgM, AMA, alpha1-antitrypsin, ceruloplasmin, iron, TIBC, ferritin, transferrin  saturation, anti-TTG, anti-endomysial Ab, Antigliadin Ab, CK, AFP, and LDH.   - IVF NS 75cc/hr  -6/7: Anti-smooth muscle and antimitochondrial antibody pending for autoimmune hepatitis and primary biliary sclerosis workup.  Transaminitis could have been due to decreased perfusion in the setting of multiple diarrhea episodes.      # Hypomagnesemia (Resolved)  - Mg of 1.51  - S/p IV mg 2g in the ED  - Will continue to monitor  -6/7: Magnesium 1.98 after repletion     Chronic Issues:  #Anxiety: buspar 15 mg, Lexapro 29mg (hold, patient is NPO)  #restless leg syndrome: ropinirole 0.5 ( hold patient is NPO)  #Hyperlipidemia: not on medications    #T2DM:  last A1c 8.7, metformin 500 mg, Ozempic 0.25 ( will hold), ISS     F: NS 75cc/hr  E: Replete as needed  N: Regular  G: None  VTE: Lovenox     Code:   Full Code  Extended Emergency Contact Information  Primary Emergency Contact: YadielGamaliel cormier  Home Phone: [phone]  Relation: Spouse      Disposition: 54 y.o.female admitted for enterocolitis and transaminitis, will anticipate <48hrs eLOS.

## 2024-06-07 NOTE — PROGRESS NOTES
06/07/24 0727   Discharge Planning   Living Arrangements Spouse/significant other   Support Systems Spouse/significant other   Assistance Needed A&OX3; very independent with ADLs with no DME; doesn't drive; room air baseline - currently 2L NC (pain meds on board)   Type of Residence Private residence   Number of Stairs to Enter Residence 3   Number of Stairs Within Residence 0   Do you have animals or pets at home? Yes   Type of Animals or Pets 1 dog   Patient expects to be discharged to: Patient denies any home going needs at this time   Does the patient need discharge transport arranged? Yes   RoundTrip coordination needed? Yes   Has discharge transport been arranged? No   Financial Resource Strain   How hard is it for you to pay for the very basics like food, housing, medical care, and heating? Not hard   Housing Stability   In the last 12 months, was there a time when you were not able to pay the mortgage or rent on time? N   In the last 12 months, how many places have you lived? 1   In the last 12 months, was there a time when you did not have a steady place to sleep or slept in a shelter (including now)? N   Transportation Needs   In the past 12 months, has lack of transportation kept you from medical appointments or from getting medications? no   In the past 12 months, has lack of transportation kept you from meetings, work, or from getting things needed for daily living? No     06/07/2024 0729am  Spoke with patient and daughter bedside in ED.

## 2024-06-08 PROBLEM — R74.01 TRANSAMINITIS: Status: ACTIVE | Noted: 2024-06-08

## 2024-06-08 LAB
ALBUMIN SERPL BCP-MCNC: 3.5 G/DL (ref 3.4–5)
ALP SERPL-CCNC: 101 U/L (ref 33–110)
ALT SERPL W P-5'-P-CCNC: 283 U/L (ref 7–45)
ANION GAP SERPL CALC-SCNC: 13 MMOL/L (ref 10–20)
AST SERPL W P-5'-P-CCNC: 107 U/L (ref 9–39)
BILIRUB SERPL-MCNC: 0.6 MG/DL (ref 0–1.2)
BUN SERPL-MCNC: 11 MG/DL (ref 6–23)
C COLI+JEJ+UPSA DNA STL QL NAA+PROBE: NOT DETECTED
C DIF TOX TCDA+TCDB STL QL NAA+PROBE: NOT DETECTED
CALCIUM SERPL-MCNC: 8.7 MG/DL (ref 8.6–10.3)
CHLORIDE SERPL-SCNC: 104 MMOL/L (ref 98–107)
CO2 SERPL-SCNC: 24 MMOL/L (ref 21–32)
CREAT SERPL-MCNC: 0.67 MG/DL (ref 0.5–1.05)
EC STX1 GENE STL QL NAA+PROBE: NOT DETECTED
EC STX2 GENE STL QL NAA+PROBE: NOT DETECTED
EGFRCR SERPLBLD CKD-EPI 2021: >90 ML/MIN/1.73M*2
ERYTHROCYTE [DISTWIDTH] IN BLOOD BY AUTOMATED COUNT: 11.7 % (ref 11.5–14.5)
GLIADIN PEPTIDE IGA SER IA-ACNC: 8.8 U/ML
GLUCOSE BLD MANUAL STRIP-MCNC: 132 MG/DL (ref 74–99)
GLUCOSE BLD MANUAL STRIP-MCNC: 144 MG/DL (ref 74–99)
GLUCOSE BLD MANUAL STRIP-MCNC: 145 MG/DL (ref 74–99)
GLUCOSE BLD MANUAL STRIP-MCNC: 153 MG/DL (ref 74–99)
GLUCOSE SERPL-MCNC: 113 MG/DL (ref 74–99)
HCT VFR BLD AUTO: 40.3 % (ref 36–46)
HGB BLD-MCNC: 13.5 G/DL (ref 12–16)
MAGNESIUM SERPL-MCNC: 1.58 MG/DL (ref 1.6–2.4)
MCH RBC QN AUTO: 30.9 PG (ref 26–34)
MCHC RBC AUTO-ENTMCNC: 33.5 G/DL (ref 32–36)
MCV RBC AUTO: 92 FL (ref 80–100)
NOROVIRUS GI + GII RNA STL NAA+PROBE: NOT DETECTED
NRBC BLD-RTO: 0 /100 WBCS (ref 0–0)
PLATELET # BLD AUTO: 167 X10*3/UL (ref 150–450)
POTASSIUM SERPL-SCNC: 3.7 MMOL/L (ref 3.5–5.3)
PROT SERPL-MCNC: 5.6 G/DL (ref 6.4–8.2)
RBC # BLD AUTO: 4.37 X10*6/UL (ref 4–5.2)
RV RNA STL NAA+PROBE: DETECTED
SALMONELLA DNA STL QL NAA+PROBE: NOT DETECTED
SHIGELLA DNA SPEC QL NAA+PROBE: NOT DETECTED
SODIUM SERPL-SCNC: 137 MMOL/L (ref 136–145)
TTG IGA SER IA-ACNC: <1 U/ML
V CHOLERAE DNA STL QL NAA+PROBE: NOT DETECTED
WBC # BLD AUTO: 3.2 X10*3/UL (ref 4.4–11.3)
Y ENTEROCOL DNA STL QL NAA+PROBE: NOT DETECTED

## 2024-06-08 PROCEDURE — 2500000001 HC RX 250 WO HCPCS SELF ADMINISTERED DRUGS (ALT 637 FOR MEDICARE OP)

## 2024-06-08 PROCEDURE — 96372 THER/PROPH/DIAG INJ SC/IM: CPT

## 2024-06-08 PROCEDURE — 1100000001 HC PRIVATE ROOM DAILY

## 2024-06-08 PROCEDURE — 84075 ASSAY ALKALINE PHOSPHATASE: CPT | Performed by: STUDENT IN AN ORGANIZED HEALTH CARE EDUCATION/TRAINING PROGRAM

## 2024-06-08 PROCEDURE — 99232 SBSQ HOSP IP/OBS MODERATE 35: CPT | Performed by: STUDENT IN AN ORGANIZED HEALTH CARE EDUCATION/TRAINING PROGRAM

## 2024-06-08 PROCEDURE — 2500000002 HC RX 250 W HCPCS SELF ADMINISTERED DRUGS (ALT 637 FOR MEDICARE OP, ALT 636 FOR OP/ED)

## 2024-06-08 PROCEDURE — 2500000004 HC RX 250 GENERAL PHARMACY W/ HCPCS (ALT 636 FOR OP/ED)

## 2024-06-08 PROCEDURE — 82947 ASSAY GLUCOSE BLOOD QUANT: CPT

## 2024-06-08 PROCEDURE — 83735 ASSAY OF MAGNESIUM: CPT

## 2024-06-08 PROCEDURE — 85027 COMPLETE CBC AUTOMATED: CPT

## 2024-06-08 PROCEDURE — 36415 COLL VENOUS BLD VENIPUNCTURE: CPT

## 2024-06-08 PROCEDURE — 2500000004 HC RX 250 GENERAL PHARMACY W/ HCPCS (ALT 636 FOR OP/ED): Performed by: STUDENT IN AN ORGANIZED HEALTH CARE EDUCATION/TRAINING PROGRAM

## 2024-06-08 RX ORDER — SODIUM CHLORIDE 9 MG/ML
100 INJECTION, SOLUTION INTRAVENOUS CONTINUOUS
Status: DISCONTINUED | OUTPATIENT
Start: 2024-06-08 | End: 2024-06-09 | Stop reason: HOSPADM

## 2024-06-08 RX ORDER — HYDROXYZINE PAMOATE 25 MG/1
50 CAPSULE ORAL NIGHTLY PRN
Status: DISCONTINUED | OUTPATIENT
Start: 2024-06-08 | End: 2024-06-09 | Stop reason: HOSPADM

## 2024-06-08 RX ORDER — PANTOPRAZOLE SODIUM 40 MG/1
40 TABLET, DELAYED RELEASE ORAL
Status: DISCONTINUED | OUTPATIENT
Start: 2024-06-09 | End: 2024-06-09 | Stop reason: HOSPADM

## 2024-06-08 RX ADMIN — ENOXAPARIN SODIUM 40 MG: 40 INJECTION SUBCUTANEOUS at 09:44

## 2024-06-08 RX ADMIN — SODIUM CHLORIDE 100 ML/HR: 9 INJECTION, SOLUTION INTRAVENOUS at 13:10

## 2024-06-08 RX ADMIN — ONDANSETRON 4 MG: 2 INJECTION INTRAMUSCULAR; INTRAVENOUS at 18:25

## 2024-06-08 RX ADMIN — HYDROXYZINE PAMOATE 50 MG: 25 CAPSULE ORAL at 21:41

## 2024-06-08 RX ADMIN — ROPINIROLE HYDROCHLORIDE 0.5 MG: 0.25 TABLET, FILM COATED ORAL at 21:41

## 2024-06-08 RX ADMIN — OXYCODONE HYDROCHLORIDE 5 MG: 5 TABLET ORAL at 09:45

## 2024-06-08 RX ADMIN — BUSPIRONE HYDROCHLORIDE 15 MG: 15 TABLET ORAL at 14:51

## 2024-06-08 RX ADMIN — BUSPIRONE HYDROCHLORIDE 15 MG: 15 TABLET ORAL at 09:44

## 2024-06-08 RX ADMIN — ESCITALOPRAM OXALATE 20 MG: 20 TABLET ORAL at 09:44

## 2024-06-08 RX ADMIN — BUSPIRONE HYDROCHLORIDE 15 MG: 15 TABLET ORAL at 21:41

## 2024-06-08 ASSESSMENT — COGNITIVE AND FUNCTIONAL STATUS - GENERAL
MOBILITY SCORE: 24
DAILY ACTIVITIY SCORE: 24

## 2024-06-08 ASSESSMENT — PAIN SCALES - GENERAL: PAINLEVEL_OUTOF10: 7

## 2024-06-08 ASSESSMENT — PAIN DESCRIPTION - LOCATION: LOCATION: ABDOMEN

## 2024-06-08 ASSESSMENT — PAIN - FUNCTIONAL ASSESSMENT: PAIN_FUNCTIONAL_ASSESSMENT: 0-10

## 2024-06-08 NOTE — PROGRESS NOTES
"Internal Medicine-Progress Note      Chief Complaint     Chief Complaint   Patient presents with    Abdominal Pain     Pt stated started Tuesday and has just been getting worse said it felt heavey and needed to come out and has not gotten better      Diarrhea     Pt states no blood, pt states very watery     Nausea        Subjective    Patient seen and examined at the bedside, although in no acute distress patient feels weaker than yesterday with a noted generalized jaundice.  He continues to complain of several bouts of diarrhea.ROS otherwise negative     Overnight Events: No overnight events     Objective    Vitals  Visit Vitals  /81 (BP Location: Left arm, Patient Position: Lying)   Pulse 82   Temp 36.4 °C (97.5 °F) (Temporal)   Resp 18   Ht 1.676 m (5' 6\")   Wt 98.9 kg (218 lb)   SpO2 92%   BMI 35.19 kg/m²   Smoking Status Never   BSA 2.15 m²       Physical Examination:  GEN:  A&Ox3, no acute distress, appears comfortable.  Conversational and appropriate.  No confusion or gross mental status changes.  EYES: EOMI, non-injected sclera.  ENT: Moist mucous membranes, no apparent injuries or lesions.   CARDIO: Normal rate and regular rhythm. No murmurs, rubs, or gallops.  2+ equal pulses of the distal extremities.   PULM: Clear to auscultation bilaterally. No rales, rhonchi, or wheezes. Good symmetric chest expansion.  GI: BS+  Soft, non-distended. No rebound tenderness or guarding.  SKIN: Generalized jaundice   MSK: ROM intact the extremities without contractures.   EXT: No peripheral edema, contusions, or wounds.   NEURO: Cranial nerves II-XII grossly intact. Sensation to light touch intact and equal bilaterally in upper and lower extremities.  Symmetric 5/5 strength in upper and lower extremities.  PSYCH: Appropriate mood and behavior, converses and responds appropriately during exam.   IOs    Intake/Output Summary (Last 24 hours) at 6/8/2024 1338  Last data filed at 6/8/2024 1310  Gross per 24 hour   Intake " "2247.5 ml   Output --   Net 2247.5 ml     Vent settings:       Labs:   Results from last 72 hours   Lab Units 06/08/24  0640 06/07/24  0802 06/07/24  0708 06/06/24 2017   SODIUM mmol/L 137 135* 137 138   POTASSIUM mmol/L 3.7 3.9 4.5 4.4   CHLORIDE mmol/L 104 105 106 105   CO2 mmol/L 24 24 23 22   BUN mg/dL 11 18 18 17   CREATININE mg/dL 0.67 0.75 0.75 0.89   GLUCOSE mg/dL 113* 198* 201* 198*   CALCIUM mg/dL 8.7 8.0* 8.0* 9.2   ANION GAP mmol/L 13 10 13 15   EGFR mL/min/1.73m*2 >90 >90 >90 78   PHOSPHORUS mg/dL  --  3.9 4.3 3.5      Results from last 72 hours   Lab Units 06/08/24  0640 06/07/24  0709 06/06/24 2017   WBC AUTO x10*3/uL 3.2* 5.6 6.7   HEMOGLOBIN g/dL 13.5 14.4 16.7*   HEMATOCRIT % 40.3 45.0 48.0*   PLATELETS AUTO x10*3/uL 167 160 205   NEUTROS PCT AUTO %  --   --  72.0   LYMPHS PCT AUTO %  --   --  18.3   MONOS PCT AUTO %  --   --  7.4   EOS PCT AUTO %  --   --  1.6      Lab Results   Component Value Date    CALCIUM 8.7 06/08/2024    PHOS 3.9 06/07/2024      Lab Results   Component Value Date    CRP 1.98 (H) 06/07/2024      [unfilled]   Micro/ID:   No results found for the last 90 days.    Results from last 7 days   Lab Units 06/07/24  0047   HEP B CORE IGM  Nonreactive   HEP B S AG  Nonreactive   HEP C AB  Nonreactive                No lab exists for component: \"AGALPCRNB\"   .ID  Lab Results   Component Value Date    BLOODCULT Loaded on Instrument - Culture in progress 06/07/2024     Images  CT abdomen pelvis w IV contrast  Narrative: Interpreted By:  Carlos White,   STUDY:  CT ABDOMEN PELVIS W IV CONTRAST;  6/6/2024 9:30 pm      INDICATION:  Signs/Symptoms:acute 10/10 LUQ pain.      COMPARISON:  CT abdomen and pelvis 10/1923      ACCESSION NUMBER(S):  [id number]      ORDERING CLINICIAN:  JASPREET HOOKS      TECHNIQUE:  CT of the abdomen and pelvis was performed.  Standard contiguous  axial images were obtained at 3 mm slice thickness through the  abdomen and pelvis. Coronal and sagittal " reconstructions at 3 mm  slice thickness were performed.      75 ml of contrast Omnipaque 350 were administered intravenously  without immediate complication.      FINDINGS:  LOWER CHEST:  The visualized lung base is unremarkable. The heart is normal in size  without pericardial effusion. No pleural effusion is present.  Visualized distal esophagus appears normal.      ABDOMEN:      LIVER:  The liver is enlarged measuring 22.4 cm in craniocaudal dimension and  demonstrates diffusely decreased attenuation suggesting steatosis. No  focal lesion.      BILE DUCTS:  The intrahepatic and extrahepatic ducts are not dilated.      GALLBLADDER:  Cholecystectomy.      PANCREAS:  The pancreas appears unremarkable without evidence of ductal  dilatation or masses.      SPLEEN:  The spleen is normal in size without focal lesions.      ADRENAL GLANDS:  Bilateral adrenal glands appear normal.      KIDNEYS AND URETERS:  The kidneys are normal in size and enhance symmetrically. Bilateral  parapelvic cysts, greater on the left. Thinly septated left inferior  pole 6.2 cm simple cyst. No hydroureteronephrosis or  nephroureterolithiasis is identified.      PELVIS:      BLADDER:  The urinary bladder appears normal without abnormal wall thickening.      REPRODUCTIVE ORGANS:  Status post hysterectomy.      BOWEL:  The stomach is unremarkable.   No bowel dilation. Several  fluid-filled small bowel and colonic loops suggestive of infectious  enterocolitis. No significant wall thickening. Normal appendix      VESSELS:  There is no aneurysmal dilatation of the abdominal aorta. The IVC  appears normal.      PERITONEUM/RETROPERITONEUM/LYMPH NODES:  There is no free or loculated fluid collection, no free  intraperitoneal air. The retroperitoneum appears normal.  No  abdominopelvic lymphadenopathy is present.      BONES AND ABDOMINAL WALL:  No suspicious osseous lesions are identified. The abdominal wall soft  tissues appear normal.      Impression:  1.  Multiple fluid filled nondilated small bowel and colonic loops,  nonspecific, but can be seen in setting of infectious enterocolitis.  2. Hepatomegaly and diffuse steatosis.          Signed by: Carlos White 6/6/2024 11:30 PM  Dictation workstation:   [id number]    Meds  Scheduled medications  busPIRone, 15 mg, oral, TID  enoxaparin, 40 mg, subcutaneous, q24h  escitalopram, 20 mg, oral, Daily  insulin lispro, 0-5 Units, subcutaneous, TID  [Held by provider] metFORMIN, 500 mg, oral, BID  rOPINIRole, 0.5 mg, oral, Nightly      Continuous medications  sodium chloride 0.9%, 100 mL/hr, Last Rate: 100 mL/hr (06/08/24 1310)      PRN medications  PRN medications: dextrose, dextrose, dicyclomine, glucagon, glucagon, ondansetron, oxyCODONE, prochlorperazine **OR** prochlorperazine **OR** prochlorperazine     Problem List    Problem list:   Patient Active Problem List   Diagnosis    Anxiety, generalized    Diabetes mellitus (Multi)    Gastro-esophageal reflux disease with esophagitis    Hyperlipidemia    Injury of left thumb    Poorly controlled type 2 diabetes mellitus (Multi)    Renal cyst    Restless legs syndrome (RLS)    Tachycardia    Class 3 severe obesity due to excess calories with body mass index (BMI) of 40.0 to 44.9 in adult (Multi)    Enterocolitis    Transaminitis        Assessment and Plan    Razia Gunn is a 54 y.o. female with a Medical History of T2DM, anxiety, restless leg syndrome, hyperlipidemia who presented on account of abdominal pain, diarrhea and nausea in the setting of use of ozempic. Imaging studies in the ER cf infectious enterocolitis, steatosis and chem showed incr LFTS.      Active Issues:     # Enterocolitis  C diff rule out  - Patient currently taking ozempic, and has had several past episodes of colitis in Feb and march of this year.  - Imaging studies; Multiple fluid filled nondilated small bowel and colonic loops,nonspecific, but can be seen in setting of infectious  enterocolitis. Hepatomegaly and diffuse steatosis.  - NPO  - C. Diff PCR  - Stool pathogen PCR  - Stool calprotectin, celiac panel, TSH  - S/p IV pantoprazole 40 mg   - S/p 1L IVF NS in the ED  - continue Ondansetron   - if symptoms persists, consider GI consult, patient may benefit form a bidirectional scope after discharge.   - pain mgt; schedule ketorolac 15 mg    -6/7: C. difficile, calprotectin, stool pathogen still pending. Celiac panel still pending. Differential is broad and includes infection such as C. difficile, E. coli. Additional causes include inflammatory bowel disease, microscopic colitis, ischemic colitis, and drug induced colitis secondary to NSAID, aspirin and/or PPI use.        #Transaminitis  :: likely due to dehydration in the setting of recent diarrhea, and reduced PO intake  - Imaging shows Hepatomegaly, steatosis  - CMP: , , Tb 1.3, mg 1.51   - avoid hepatotoxic medications  - Hepatitis panel  - recheck LFT in the AM still incr, consider ordering further work for possible etiology of pathology including: FRANC, smooth muscle antibodies (PRIMO and immunofluorescence), Liver Kidney Microsomal Antibodies (LKM), Soluble Liver Antigen antibodies, total IgG, total IgA, total IgM, AMA, alpha1-antitrypsin, ceruloplasmin, iron, TIBC, ferritin, transferrin saturation, anti-TTG, anti-endomysial Ab, Antigliadin Ab, CK, AFP, and LDH.   - IVF NS 75cc/hr  -6/7: Anti-smooth muscle and antimitochondrial antibody pending for autoimmune hepatitis and primary biliary sclerosis workup. Transaminitis could have been due to decreased perfusion in the setting of multiple diarrhea episodes.   -6/8: Transaminitis much improved with an , .  Patient now exhibiting generalized jaundice.    # Hypomagnesemia (Resolved)  - Mg of 1.51  - S/p IV mg 2g in the ED  - Will continue to monitor  -6/7: Magnesium 1.98 after repletion     Chronic Issues:  #Anxiety: buspar 15 mg, Lexapro 29mg (hold, patient  is NPO)  #restless leg syndrome: ropinirole 0.5 ( hold patient is NPO)  #Hyperlipidemia: not on medications   #T2DM: last A1c 8.7, metformin 500 mg, Ozempic 0.25 ( will hold), ISS     F: NS 75cc/hr  E: Replete as needed  N: Regular  G: None  VTE: Lovenox     Code Status: Full Code   Emergency Contact: Extended Emergency Contact Information  Primary Emergency Contact: Gamaliel Gunn  Home Phone: [phone]  Relation: Spouse     Disposition: 54 y.o.female admitted for enterocolitis and transaminitis.  Liver enzymes now much improved but continues to demonstrate generalized jaundice and weakness.  Abdominal ultrasound ordered and pending.      Margret Huertas MD   PGY-2   Epic chat

## 2024-06-09 VITALS
DIASTOLIC BLOOD PRESSURE: 78 MMHG | OXYGEN SATURATION: 92 % | BODY MASS INDEX: 35.03 KG/M2 | TEMPERATURE: 98.6 F | HEART RATE: 82 BPM | HEIGHT: 66 IN | SYSTOLIC BLOOD PRESSURE: 116 MMHG | WEIGHT: 218 LBS | RESPIRATION RATE: 16 BRPM

## 2024-06-09 LAB
ALBUMIN SERPL BCP-MCNC: 3.3 G/DL (ref 3.4–5)
ALBUMIN SERPL BCP-MCNC: 3.3 G/DL (ref 3.4–5)
ALP SERPL-CCNC: 86 U/L (ref 33–110)
ALT SERPL W P-5'-P-CCNC: 181 U/L (ref 7–45)
ANION GAP SERPL CALC-SCNC: 10 MMOL/L (ref 10–20)
AST SERPL W P-5'-P-CCNC: 51 U/L (ref 9–39)
BILIRUB DIRECT SERPL-MCNC: 0.1 MG/DL (ref 0–0.3)
BILIRUB SERPL-MCNC: 0.4 MG/DL (ref 0–1.2)
BUN SERPL-MCNC: 8 MG/DL (ref 6–23)
CALCIUM SERPL-MCNC: 8.4 MG/DL (ref 8.6–10.3)
CHLORIDE SERPL-SCNC: 107 MMOL/L (ref 98–107)
CO2 SERPL-SCNC: 25 MMOL/L (ref 21–32)
CREAT SERPL-MCNC: 0.74 MG/DL (ref 0.5–1.05)
EGFRCR SERPLBLD CKD-EPI 2021: >90 ML/MIN/1.73M*2
ERYTHROCYTE [DISTWIDTH] IN BLOOD BY AUTOMATED COUNT: 11.8 % (ref 11.5–14.5)
GLUCOSE BLD MANUAL STRIP-MCNC: 123 MG/DL (ref 74–99)
GLUCOSE BLD MANUAL STRIP-MCNC: 165 MG/DL (ref 74–99)
GLUCOSE SERPL-MCNC: 123 MG/DL (ref 74–99)
HCT VFR BLD AUTO: 39.5 % (ref 36–46)
HGB BLD-MCNC: 12.9 G/DL (ref 12–16)
MAGNESIUM SERPL-MCNC: 1.51 MG/DL (ref 1.6–2.4)
MCH RBC QN AUTO: 30.4 PG (ref 26–34)
MCHC RBC AUTO-ENTMCNC: 32.7 G/DL (ref 32–36)
MCV RBC AUTO: 93 FL (ref 80–100)
NRBC BLD-RTO: 0 /100 WBCS (ref 0–0)
PHOSPHATE SERPL-MCNC: 3.4 MG/DL (ref 2.5–4.9)
PLATELET # BLD AUTO: 164 X10*3/UL (ref 150–450)
POTASSIUM SERPL-SCNC: 3.4 MMOL/L (ref 3.5–5.3)
PROT SERPL-MCNC: 5.3 G/DL (ref 6.4–8.2)
RBC # BLD AUTO: 4.24 X10*6/UL (ref 4–5.2)
SODIUM SERPL-SCNC: 139 MMOL/L (ref 136–145)
WBC # BLD AUTO: 3.3 X10*3/UL (ref 4.4–11.3)

## 2024-06-09 PROCEDURE — 2500000004 HC RX 250 GENERAL PHARMACY W/ HCPCS (ALT 636 FOR OP/ED)

## 2024-06-09 PROCEDURE — 82040 ASSAY OF SERUM ALBUMIN: CPT

## 2024-06-09 PROCEDURE — 82947 ASSAY GLUCOSE BLOOD QUANT: CPT

## 2024-06-09 PROCEDURE — 80069 RENAL FUNCTION PANEL: CPT

## 2024-06-09 PROCEDURE — 2500000002 HC RX 250 W HCPCS SELF ADMINISTERED DRUGS (ALT 637 FOR MEDICARE OP, ALT 636 FOR OP/ED)

## 2024-06-09 PROCEDURE — 85027 COMPLETE CBC AUTOMATED: CPT

## 2024-06-09 PROCEDURE — 2500000001 HC RX 250 WO HCPCS SELF ADMINISTERED DRUGS (ALT 637 FOR MEDICARE OP)

## 2024-06-09 PROCEDURE — 36415 COLL VENOUS BLD VENIPUNCTURE: CPT

## 2024-06-09 PROCEDURE — 99239 HOSP IP/OBS DSCHRG MGMT >30: CPT | Performed by: STUDENT IN AN ORGANIZED HEALTH CARE EDUCATION/TRAINING PROGRAM

## 2024-06-09 PROCEDURE — 2500000001 HC RX 250 WO HCPCS SELF ADMINISTERED DRUGS (ALT 637 FOR MEDICARE OP): Performed by: STUDENT IN AN ORGANIZED HEALTH CARE EDUCATION/TRAINING PROGRAM

## 2024-06-09 PROCEDURE — 83735 ASSAY OF MAGNESIUM: CPT

## 2024-06-09 RX ORDER — MAGNESIUM SULFATE HEPTAHYDRATE 40 MG/ML
2 INJECTION, SOLUTION INTRAVENOUS ONCE
Status: COMPLETED | OUTPATIENT
Start: 2024-06-09 | End: 2024-06-09

## 2024-06-09 RX ADMIN — ESCITALOPRAM OXALATE 20 MG: 20 TABLET ORAL at 08:02

## 2024-06-09 RX ADMIN — INSULIN LISPRO 1 UNITS: 100 INJECTION, SOLUTION INTRAVENOUS; SUBCUTANEOUS at 12:09

## 2024-06-09 RX ADMIN — OXYCODONE HYDROCHLORIDE 5 MG: 5 TABLET ORAL at 08:02

## 2024-06-09 RX ADMIN — BUSPIRONE HYDROCHLORIDE 15 MG: 15 TABLET ORAL at 14:24

## 2024-06-09 RX ADMIN — PANTOPRAZOLE SODIUM 40 MG: 40 TABLET, DELAYED RELEASE ORAL at 06:08

## 2024-06-09 RX ADMIN — ENOXAPARIN SODIUM 40 MG: 40 INJECTION SUBCUTANEOUS at 08:01

## 2024-06-09 RX ADMIN — BUSPIRONE HYDROCHLORIDE 15 MG: 15 TABLET ORAL at 08:02

## 2024-06-09 RX ADMIN — MAGNESIUM SULFATE HEPTAHYDRATE 2 G: 2 INJECTION, SOLUTION INTRAVENOUS at 10:26

## 2024-06-09 ASSESSMENT — PAIN SCALES - GENERAL: PAINLEVEL_OUTOF10: 6

## 2024-06-09 NOTE — DISCHARGE INSTRUCTIONS
1) Following recommendations were made on your discharge day:    -Please follow-up with gastroenterology outpatient   Regarding multiple bouts of enterocolitis and potential colonoscopy.    -Referral was placed to gastroenterology    - Your primary care inpatient team.

## 2024-06-09 NOTE — CARE PLAN
The patient's goals for the shift include  be discharged home.    The clinical goals for the shift include Patient will have pain control.    Over the shift, the patient did not make progress toward the following goals. Barriers to progression include acute illness. Recommendations to address these barriers include medications.

## 2024-06-09 NOTE — DISCHARGE SUMMARY
Discharge Diagnosis  Enterocolitis    Issues Requiring Follow-Up  Follow up with gastroenterologist     Discharge Meds     Your medication list        ASK your doctor about these medications        Instructions Last Dose Given Next Dose Due   acetaminophen 325 mg tablet  Commonly known as: Tylenol           busPIRone 15 mg tablet  Commonly known as: Buspar           CHOLECALCIFEROL (VITAMIN D3) ORAL           CRANBERRY ORAL           CYANOCOBALAMIN (VITAMIN B-12) ORAL           dicyclomine 20 mg tablet  Commonly known as: Bentyl      Take 0.5 tablets (10 mg) by mouth 4 times a day as needed (abdominal pain).       escitalopram 20 mg tablet  Commonly known as: Lexapro           estradiol 0.01 % (0.1 mg/gram) vaginal cream  Commonly known as: Estrace      Insert 20 Applications into the vagina once daily. Apply to vagina nightly for 1 week then every Monday/Wednesday/Friday.       hydrOXYzine pamoate 50 mg capsule  Commonly known as: Vistaril           metFORMIN 500 mg tablet  Commonly known as: Glucophage      TAKE 1 TABLET BY MOUTH WITH WITH LUNCH AND SUPPER       Ozempic 0.25 mg or 0.5 mg(2 mg/1.5 mL) pen injector  Generic drug: semaglutide      Inject 0.25 mg once a week for 4 weeks then increase to 0.5 mg weekly thereafter       Ozempic 1 mg/dose (2 mg/1.5 mL) pen injector  Generic drug: semaglutide      Inject 1 mg under the skin 1 (one) time per week.       rOPINIRole 0.5 mg tablet  Commonly known as: Requip      Take 1 tablet (0.5 mg) by mouth once daily at bedtime.                Test Results Pending At Discharge  Pending Labs       Order Current Status    Anti-smooth muscle antibody, IgG In process    Antimitochondrial antibody In process    Calprotectin Stool In process    Celiac Panel In process    Deamidated Gliadin Antibody IgG In process    Tissue Transglutaminase IgG In process    Blood Culture Preliminary result            Hospital Course  Razia Cottomanojritchie is a 54 y.o. female with a Medical History  of T2DM, anxiety, restless leg syndrome, hyperlipidemia who presented on account of abdominal pain, diarrhea and nausea.         ED course:   Vital signs; T 37.9, , RR 20, spo2 94 on RA      Labs:   Cbc: 6.7, HB 16.7, MCV 91, plt  205  CMP: , , Tb 1.3, mg 1.51, lactate 1.3, lipase 39  UA, Le 75, otherwise unremarkable      Imaging:   Multiple fluid filled nondilated small bowel and colonic loops,nonspecific, but can be seen in setting of infectious enterocolitis. Hepatomegaly and diffuse steatosis.     Micro stool pathogen pcr  Intervention; IV mg 2g, c diff, Dialudid 1mg , ketorolac 15mg,morphine 4mg , ondanserton 4mg pantoprazole 40mg       Floor course  6/7: Enterocolitis work up still pending. C. difficile, calprotectin, stool pathogen still pending. Celiac panel still pending. Differential is broad and includes infection such as C. difficile, E. coli. Additional causes include inflammatory bowel disease, microscopic colitis, ischemic colitis, and drug induced colitis secondary to NSAID, aspirin and/or PPI use.   6/8: Transaminitis improving.  /.  Patient noted to be jaundiced.  Patient was found to have rhinovirus on stool pathogen studies.  6/9: Patient is going to advance her diet and symptomatic management and will be discharged in hemodynamically stable condition.  \    Pertinent Physical Exam At Time of Discharge  Physical Exam  GEN:  A&Ox3, no acute distress, appears comfortable.  Conversational and appropriate.  No confusion or gross mental status changes.  EYES: EOMI, non-injected sclera.  ENT: Moist mucous membranes, no apparent injuries or lesions.   CARDIO: Normal rate and regular rhythm. No murmurs, rubs, or gallops.  2+ equal pulses of the distal extremities.   PULM: Clear to auscultation bilaterally. No rales, rhonchi, or wheezes. Good symmetric chest expansion.  GI: BS+  Soft, non-distended. No rebound tenderness or guarding. Tenderness in epigastric  region  SKIN: No Jaundice  MSK: ROM intact the extremities without contractures.   EXT: No peripheral edema, contusions, or wounds.   NEURO: Cranial nerves II-XII grossly intact. Sensation to light touch intact and equal bilaterally in upper and lower extremities.  Symmetric 5/5 strength in upper and lower extremities.  PSYCH: Appropriate mood and behavior, converses and responds appropriately during exam.  Outpatient Follow-Up  No future appointments.      Anastasiia Mariscal MD

## 2024-06-10 ENCOUNTER — DOCUMENTATION (OUTPATIENT)
Dept: PRIMARY CARE | Facility: CLINIC | Age: 54
End: 2024-06-10
Payer: COMMERCIAL

## 2024-06-10 LAB
ATRIAL RATE: 102 BPM
GLIADIN PEPTIDE IGG SER IA-ACNC: 1.36 FLU (ref 0–4.99)
P AXIS: 24 DEGREES
P OFFSET: 210 MS
P ONSET: 154 MS
PR INTERVAL: 132 MS
Q ONSET: 220 MS
QRS COUNT: 17 BEATS
QRS DURATION: 74 MS
QT INTERVAL: 340 MS
QTC CALCULATION(BAZETT): 443 MS
QTC FREDERICIA: 405 MS
R AXIS: 81 DEGREES
T AXIS: 48 DEGREES
T OFFSET: 390 MS
TTG IGG SER IA-ACNC: <0.82 FLU (ref 0–4.99)
VENTRICULAR RATE: 102 BPM

## 2024-06-11 ENCOUNTER — PATIENT OUTREACH (OUTPATIENT)
Dept: PRIMARY CARE | Facility: CLINIC | Age: 54
End: 2024-06-11
Payer: COMMERCIAL

## 2024-06-11 LAB
BACTERIA BLD CULT: NORMAL
CALPROTECTIN STL-MCNT: 89 UG/G
MITOCHONDRIA AB SER QL IF: NEGATIVE
SMOOTH MUSCLE AB SER QL IF: NEGATIVE

## 2024-06-11 NOTE — PROGRESS NOTES
Outreach made for discharge follow up. At the time of call, the patient stated she is slowly feeling better as her symptoms resolve. Denies abdominal pain. Her last episode of diarrhea was yesterday morning. Is eating and drinking. Patient declined hospital follow up with QUINTIN Goel. Advised patient to call the office for any future needs. TCM program closed.     Discharge Facility: [facility] Diagnosis: Enterocolitis  Admission Date: 6/7/2024  Discharge Date: 6/9/2024    PCP Appointment Date: Declined to scheduled hospital follow up  Specialist Appointment Date: Dr. Ghanshyam Szymanski, Gastroenterology recommended  Hospital Encounter and Summary: Linked

## 2024-06-12 NOTE — SIGNIFICANT EVENT
Follow Up Phone Call    Outgoing phone call    Spoke to: Razia Gunn Relationship:self   Phone number: [phone]      Outcome: contacted patient/ family   Chief Complaint   Patient presents with    Abdominal Pain     Pt stated started Tuesday and has just been getting worse said it felt heavey and needed to come out and has not gotten better      Diarrhea     Pt states no blood, pt states very watery     Nausea          Diagnosis:Not applicable    States she is feeling better. Bowels are moving. States she will call and schedule follow up appointments. No further questions or concerns.

## 2024-06-17 DIAGNOSIS — E11.65 POORLY CONTROLLED TYPE 2 DIABETES MELLITUS (MULTI): Primary | ICD-10-CM

## 2024-06-17 LAB
ATRIAL RATE: 102 BPM
P AXIS: 24 DEGREES
P OFFSET: 210 MS
P ONSET: 154 MS
PR INTERVAL: 132 MS
Q ONSET: 220 MS
QRS COUNT: 17 BEATS
QRS DURATION: 74 MS
QT INTERVAL: 340 MS
QTC CALCULATION(BAZETT): 443 MS
QTC FREDERICIA: 405 MS
R AXIS: 81 DEGREES
T AXIS: 48 DEGREES
T OFFSET: 390 MS
VENTRICULAR RATE: 102 BPM

## 2024-07-31 ENCOUNTER — APPOINTMENT (OUTPATIENT)
Dept: RADIOLOGY | Facility: HOSPITAL | Age: 54
End: 2024-07-31
Payer: COMMERCIAL

## 2024-08-05 ENCOUNTER — APPOINTMENT (OUTPATIENT)
Dept: RADIOLOGY | Facility: HOSPITAL | Age: 54
End: 2024-08-05
Payer: COMMERCIAL

## 2024-09-26 ENCOUNTER — HOSPITAL ENCOUNTER (EMERGENCY)
Facility: HOSPITAL | Age: 54
Discharge: HOME | End: 2024-09-26
Payer: COMMERCIAL

## 2024-09-26 VITALS
HEART RATE: 82 BPM | RESPIRATION RATE: 20 BRPM | WEIGHT: 220 LBS | SYSTOLIC BLOOD PRESSURE: 110 MMHG | TEMPERATURE: 98.6 F | HEIGHT: 66 IN | DIASTOLIC BLOOD PRESSURE: 66 MMHG | BODY MASS INDEX: 35.36 KG/M2 | OXYGEN SATURATION: 98 %

## 2024-09-26 DIAGNOSIS — E86.0 DEHYDRATION: ICD-10-CM

## 2024-09-26 DIAGNOSIS — E11.9 TYPE 2 DIABETES MELLITUS WITHOUT COMPLICATION, WITHOUT LONG-TERM CURRENT USE OF INSULIN (MULTI): ICD-10-CM

## 2024-09-26 DIAGNOSIS — R30.0 DYSURIA: Primary | ICD-10-CM

## 2024-09-26 DIAGNOSIS — R73.9 HYPERGLYCEMIA: ICD-10-CM

## 2024-09-26 LAB
ALBUMIN SERPL BCP-MCNC: 4.1 G/DL (ref 3.4–5)
ALP SERPL-CCNC: 94 U/L (ref 33–110)
ALT SERPL W P-5'-P-CCNC: 29 U/L (ref 7–45)
ANION GAP BLDV CALCULATED.4IONS-SCNC: 10 MMOL/L (ref 10–25)
ANION GAP SERPL CALC-SCNC: 15 MMOL/L (ref 10–20)
APPEARANCE UR: CLEAR
AST SERPL W P-5'-P-CCNC: 24 U/L (ref 9–39)
BASE EXCESS BLDV CALC-SCNC: -6.3 MMOL/L (ref -2–3)
BASOPHILS # BLD AUTO: 0.02 X10*3/UL (ref 0–0.1)
BASOPHILS NFR BLD AUTO: 0.2 %
BILIRUB SERPL-MCNC: 0.8 MG/DL (ref 0–1.2)
BILIRUB UR STRIP.AUTO-MCNC: NEGATIVE MG/DL
BODY TEMPERATURE: ABNORMAL
BUN SERPL-MCNC: 13 MG/DL (ref 6–23)
CA-I BLDV-SCNC: 0.99 MMOL/L (ref 1.1–1.33)
CALCIUM SERPL-MCNC: 9.4 MG/DL (ref 8.6–10.3)
CHLORIDE BLDV-SCNC: 111 MMOL/L (ref 98–107)
CHLORIDE SERPL-SCNC: 99 MMOL/L (ref 98–107)
CO2 SERPL-SCNC: 23 MMOL/L (ref 21–32)
COLOR UR: YELLOW
CREAT SERPL-MCNC: 0.98 MG/DL (ref 0.5–1.05)
EGFRCR SERPLBLD CKD-EPI 2021: 69 ML/MIN/1.73M*2
EOSINOPHIL # BLD AUTO: 0.01 X10*3/UL (ref 0–0.7)
EOSINOPHIL NFR BLD AUTO: 0.1 %
ERYTHROCYTE [DISTWIDTH] IN BLOOD BY AUTOMATED COUNT: 12 % (ref 11.5–14.5)
GLUCOSE BLD MANUAL STRIP-MCNC: 300 MG/DL (ref 74–99)
GLUCOSE BLD MANUAL STRIP-MCNC: 347 MG/DL (ref 74–99)
GLUCOSE BLDV-MCNC: 304 MG/DL (ref 74–99)
GLUCOSE SERPL-MCNC: 389 MG/DL (ref 74–99)
GLUCOSE UR STRIP.AUTO-MCNC: ABNORMAL MG/DL
HCO3 BLDV-SCNC: 17.7 MMOL/L (ref 22–26)
HCT VFR BLD AUTO: 45.9 % (ref 36–46)
HCT VFR BLD EST: 37 % (ref 36–46)
HGB BLD-MCNC: 15.4 G/DL (ref 12–16)
HGB BLDV-MCNC: 12.2 G/DL (ref 12–16)
IMM GRANULOCYTES # BLD AUTO: 0.04 X10*3/UL (ref 0–0.7)
IMM GRANULOCYTES NFR BLD AUTO: 0.4 % (ref 0–0.9)
INHALED O2 CONCENTRATION: 21 %
KETONES UR STRIP.AUTO-MCNC: ABNORMAL MG/DL
LACTATE BLDV-SCNC: 0.7 MMOL/L (ref 0.4–2)
LACTATE SERPL-SCNC: 1.2 MMOL/L (ref 0.4–2)
LEUKOCYTE ESTERASE UR QL STRIP.AUTO: NEGATIVE
LIPASE SERPL-CCNC: 18 U/L (ref 9–82)
LYMPHOCYTES # BLD AUTO: 1 X10*3/UL (ref 1.2–4.8)
LYMPHOCYTES NFR BLD AUTO: 9.3 %
MCH RBC QN AUTO: 30.4 PG (ref 26–34)
MCHC RBC AUTO-ENTMCNC: 33.6 G/DL (ref 32–36)
MCV RBC AUTO: 91 FL (ref 80–100)
MONOCYTES # BLD AUTO: 0.45 X10*3/UL (ref 0.1–1)
MONOCYTES NFR BLD AUTO: 4.2 %
NEUTROPHILS # BLD AUTO: 9.2 X10*3/UL (ref 1.2–7.7)
NEUTROPHILS NFR BLD AUTO: 85.8 %
NITRITE UR QL STRIP.AUTO: NEGATIVE
NRBC BLD-RTO: 0 /100 WBCS (ref 0–0)
OXYHGB MFR BLDV: 85 % (ref 45–75)
PCO2 BLDV: 30 MM HG (ref 41–51)
PH BLDV: 7.38 PH (ref 7.33–7.43)
PH UR STRIP.AUTO: 5.5 [PH]
PLATELET # BLD AUTO: 186 X10*3/UL (ref 150–450)
PO2 BLDV: 54 MM HG (ref 35–45)
POTASSIUM BLDV-SCNC: 2.7 MMOL/L (ref 3.5–5.3)
POTASSIUM SERPL-SCNC: 4.3 MMOL/L (ref 3.5–5.3)
POTASSIUM SERPL-SCNC: 4.4 MMOL/L (ref 3.5–5.3)
PROT SERPL-MCNC: 7.2 G/DL (ref 6.4–8.2)
PROT UR STRIP.AUTO-MCNC: NEGATIVE MG/DL
RBC # BLD AUTO: 5.06 X10*6/UL (ref 4–5.2)
RBC # UR STRIP.AUTO: NEGATIVE /UL
SAO2 % BLDV: 88 % (ref 45–75)
SODIUM BLDV-SCNC: 136 MMOL/L (ref 136–145)
SODIUM SERPL-SCNC: 133 MMOL/L (ref 136–145)
SP GR UR STRIP.AUTO: 1.04
UROBILINOGEN UR STRIP.AUTO-MCNC: NORMAL MG/DL
WBC # BLD AUTO: 10.7 X10*3/UL (ref 4.4–11.3)

## 2024-09-26 PROCEDURE — 96374 THER/PROPH/DIAG INJ IV PUSH: CPT

## 2024-09-26 PROCEDURE — 81003 URINALYSIS AUTO W/O SCOPE: CPT | Performed by: NURSE PRACTITIONER

## 2024-09-26 PROCEDURE — 2500000002 HC RX 250 W HCPCS SELF ADMINISTERED DRUGS (ALT 637 FOR MEDICARE OP, ALT 636 FOR OP/ED): Performed by: NURSE PRACTITIONER

## 2024-09-26 PROCEDURE — 96361 HYDRATE IV INFUSION ADD-ON: CPT

## 2024-09-26 PROCEDURE — 82947 ASSAY GLUCOSE BLOOD QUANT: CPT | Mod: 59

## 2024-09-26 PROCEDURE — 83605 ASSAY OF LACTIC ACID: CPT | Performed by: NURSE PRACTITIONER

## 2024-09-26 PROCEDURE — 85025 COMPLETE CBC W/AUTO DIFF WBC: CPT | Performed by: NURSE PRACTITIONER

## 2024-09-26 PROCEDURE — 36415 COLL VENOUS BLD VENIPUNCTURE: CPT | Performed by: NURSE PRACTITIONER

## 2024-09-26 PROCEDURE — 2500000004 HC RX 250 GENERAL PHARMACY W/ HCPCS (ALT 636 FOR OP/ED): Performed by: NURSE PRACTITIONER

## 2024-09-26 PROCEDURE — 2500000004 HC RX 250 GENERAL PHARMACY W/ HCPCS (ALT 636 FOR OP/ED)

## 2024-09-26 PROCEDURE — 83690 ASSAY OF LIPASE: CPT | Performed by: NURSE PRACTITIONER

## 2024-09-26 PROCEDURE — 99284 EMERGENCY DEPT VISIT MOD MDM: CPT | Mod: 25

## 2024-09-26 PROCEDURE — 84132 ASSAY OF SERUM POTASSIUM: CPT | Mod: 59 | Performed by: NURSE PRACTITIONER

## 2024-09-26 PROCEDURE — 96375 TX/PRO/DX INJ NEW DRUG ADDON: CPT

## 2024-09-26 PROCEDURE — 2500000001 HC RX 250 WO HCPCS SELF ADMINISTERED DRUGS (ALT 637 FOR MEDICARE OP): Performed by: NURSE PRACTITIONER

## 2024-09-26 PROCEDURE — 84155 ASSAY OF PROTEIN SERUM: CPT | Performed by: NURSE PRACTITIONER

## 2024-09-26 RX ORDER — NAPROXEN 500 MG/1
500 TABLET ORAL
Qty: 20 TABLET | Refills: 0 | Status: SHIPPED | OUTPATIENT
Start: 2024-09-26 | End: 2024-09-29 | Stop reason: HOSPADM

## 2024-09-26 RX ORDER — KETOROLAC TROMETHAMINE 15 MG/ML
15 INJECTION, SOLUTION INTRAMUSCULAR; INTRAVENOUS ONCE
Status: COMPLETED | OUTPATIENT
Start: 2024-09-26 | End: 2024-09-26

## 2024-09-26 RX ORDER — ONDANSETRON HYDROCHLORIDE 2 MG/ML
4 INJECTION, SOLUTION INTRAVENOUS ONCE
Status: COMPLETED | OUTPATIENT
Start: 2024-09-26 | End: 2024-09-26

## 2024-09-26 RX ORDER — PHENAZOPYRIDINE HYDROCHLORIDE 100 MG/1
100 TABLET, FILM COATED ORAL 3 TIMES DAILY PRN
Qty: 10 TABLET | Refills: 0 | Status: SHIPPED | OUTPATIENT
Start: 2024-09-26 | End: 2024-09-29 | Stop reason: HOSPADM

## 2024-09-26 RX ORDER — POTASSIUM CHLORIDE 1.5 G/1.58G
40 POWDER, FOR SOLUTION ORAL 2 TIMES DAILY
Status: DISCONTINUED | OUTPATIENT
Start: 2024-09-26 | End: 2024-09-26 | Stop reason: HOSPADM

## 2024-09-26 RX ORDER — INSULIN LISPRO 100 [IU]/ML
7 INJECTION, SOLUTION INTRAVENOUS; SUBCUTANEOUS ONCE
Status: COMPLETED | OUTPATIENT
Start: 2024-09-26 | End: 2024-09-26

## 2024-09-26 RX ORDER — ONDANSETRON HYDROCHLORIDE 2 MG/ML
INJECTION, SOLUTION INTRAVENOUS
Status: COMPLETED
Start: 2024-09-26 | End: 2024-09-26

## 2024-09-26 RX ORDER — PHENAZOPYRIDINE HYDROCHLORIDE 100 MG/1
95 TABLET, FILM COATED ORAL ONCE
Status: COMPLETED | OUTPATIENT
Start: 2024-09-26 | End: 2024-09-26

## 2024-09-26 RX ORDER — INSULIN LISPRO 100 [IU]/ML
10 INJECTION, SOLUTION INTRAVENOUS; SUBCUTANEOUS ONCE
Status: DISCONTINUED | OUTPATIENT
Start: 2024-09-26 | End: 2024-09-26

## 2024-09-26 ASSESSMENT — COLUMBIA-SUICIDE SEVERITY RATING SCALE - C-SSRS
6. HAVE YOU EVER DONE ANYTHING, STARTED TO DO ANYTHING, OR PREPARED TO DO ANYTHING TO END YOUR LIFE?: NO
2. HAVE YOU ACTUALLY HAD ANY THOUGHTS OF KILLING YOURSELF?: NO
1. IN THE PAST MONTH, HAVE YOU WISHED YOU WERE DEAD OR WISHED YOU COULD GO TO SLEEP AND NOT WAKE UP?: NO

## 2024-09-26 ASSESSMENT — PAIN SCALES - GENERAL
PAINLEVEL_OUTOF10: 6
PAINLEVEL_OUTOF10: 0 - NO PAIN
PAINLEVEL_OUTOF10: 8

## 2024-09-26 ASSESSMENT — PAIN DESCRIPTION - LOCATION: LOCATION: ABDOMEN

## 2024-09-26 ASSESSMENT — PAIN DESCRIPTION - DESCRIPTORS
DESCRIPTORS: BURNING;ACHING
DESCRIPTORS: ACHING;BURNING

## 2024-09-26 ASSESSMENT — PAIN DESCRIPTION - PAIN TYPE: TYPE: ACUTE PAIN

## 2024-09-26 ASSESSMENT — LIFESTYLE VARIABLES
HAVE PEOPLE ANNOYED YOU BY CRITICIZING YOUR DRINKING: NO
HAVE YOU EVER FELT YOU SHOULD CUT DOWN ON YOUR DRINKING: NO
EVER FELT BAD OR GUILTY ABOUT YOUR DRINKING: NO
EVER HAD A DRINK FIRST THING IN THE MORNING TO STEADY YOUR NERVES TO GET RID OF A HANGOVER: NO
TOTAL SCORE: 0

## 2024-09-26 ASSESSMENT — PAIN - FUNCTIONAL ASSESSMENT: PAIN_FUNCTIONAL_ASSESSMENT: 0-10

## 2024-09-26 ASSESSMENT — PAIN DESCRIPTION - ORIENTATION: ORIENTATION: LOWER

## 2024-09-26 NOTE — Clinical Note
Razia Gunn was seen and treated in our emergency department on 9/26/2024.  She may return to work on 09/30/2024.       If you have any questions or concerns, please don't hesitate to call.      Kel Neff, APRN-CNP

## 2024-09-26 NOTE — ED TRIAGE NOTES
Patient c/o lower abdominal pain with nausea and painful urination that started yesterday. Patient was recently diagnosed with a yeast infection by home nursing and prescribed meds but yesterday her pain increased.

## 2024-09-26 NOTE — DISCHARGE INSTRUCTIONS
Please stop by Dr. Fox's office for samples of Ozempic that will carryover taking your Ozempic next week.  Please monitor your blood glucose carefully.  Please hydrate appropriately as you are severely dehydrated from the hyperglycemia.  Peridium is an excellent job for dysuria.

## 2024-09-26 NOTE — ED PROVIDER NOTES
HPI   Chief Complaint   Patient presents with    Abdominal Pain       54-year-old female presents today with lower bladder pain.  She has had urinary tract infections that have caused her to become sepsis.  She has a history of diabetes and she ran out of her Ozempic and it will be refilled next week.  Her blood glucose was running in the 400s but today when she checked it was 200.  She has never been hospitalized for diabetic ketoacidosis and she has never been diagnosed with DKA.  She denies diarrhea or constipation.  She has 2 prior CTs of her abdomen on June 6 which keep was concerning for infectious enterocolitis and on October 19, 2023 with concern for infectious colitis but this time she is not experiencing diarrhea or constipation.  All pain is located out of the bladder.  She denies left lower quadrant pain or right lower quadrant pain.  She denies left upper quadrant pain or right upper quadrant pain and she has a history of a cholecystectomy.  She denies concern for pregnancy and she is postmenopausal.  She states that she experienced a fever and she was shaking while urinating.  She endorses dysuria.  She denies hematuria.  In triage she was 180/90 with a heart rate of 106.  She had a temp of 37.7 Celsius.  She has not taken anything for pain or fever.  Family member is bedside.      History provided by:  Patient and relative   used: No            Patient History   Past Medical History:   Diagnosis Date    Acute candidiasis of vulva and vagina 01/20/2015    Yeast vaginitis    Acute laryngitis 12/18/2014    Acute laryngitis    Acute maxillary sinusitis, unspecified 03/08/2020    Acute maxillary sinusitis    Candidiasis, unspecified 12/20/2017    Yeast infection    Depression     Diabetes mellitus (Multi)     Other conditions influencing health status     History of burning on urination    Other conditions influencing health status 09/30/2016    Cyst    Other conditions influencing  health status 03/06/2014    History of dyspareunia    Other displaced fracture of base of first metacarpal bone, left hand, initial encounter for closed fracture 07/22/2021    Closed displaced fracture of base of first metacarpal bone of left hand, unspecified fracture morphology, initial encounter    Pelvic and perineal pain 01/23/2017    Pelvic pain in female    Personal history of other benign neoplasm 10/03/2016    History of uterine leiomyoma    Personal history of other diseases of the circulatory system 01/23/2017    History of hypertension    Personal history of other diseases of the musculoskeletal system and connective tissue 12/08/2015    History of low back pain    Personal history of other diseases of the respiratory system 02/24/2020    History of influenza    Personal history of other diseases of the respiratory system 09/26/2018    History of acute bronchitis    Personal history of other diseases of the respiratory system 02/15/2014    Personal history of acute sinusitis    Personal history of other diseases of the respiratory system 04/22/2019    History of acute bronchitis    Personal history of other diseases of the respiratory system 11/21/2017    History of acute sinusitis    Personal history of other diseases of urinary system 08/22/2017    History of cystocele    Personal history of other endocrine, nutritional and metabolic disease 10/16/2014    History of type 2 diabetes mellitus    Personal history of other mental and behavioral disorders 05/28/2013    History of depression    Personal history of other specified conditions 03/25/2014    History of chest pain    Personal history of other specified conditions 08/22/2017    History of dysuria    Personal history of other specified conditions 03/19/2015    History of diarrhea    Personal history of other specified conditions 10/10/2014    History of abdominal pain    Phlebitis and thrombophlebitis of other sites 03/20/2020    Thrombophlebitis  arm    Phlebitis and thrombophlebitis of other sites 03/20/2020    Phlebitis of arm    Right lower quadrant pain 11/01/2013    Abdominal pain, RLQ (right lower quadrant)    Unspecified condition associated with female genital organs and menstrual cycle 12/21/2015    Adnexal cyst    Unspecified symptoms and signs involving the genitourinary system 10/17/2016    UTI symptoms     Past Surgical History:   Procedure Laterality Date    CHOLECYSTECTOMY  05/28/2013    Cholecystectomy    TOTAL KNEE ARTHROPLASTY Right     TOTAL VAGINAL HYSTERECTOMY  01/23/2017    Vaginal Hysterectomy    TUBAL LIGATION  05/28/2013    Tubal Ligation     No family history on file.  Social History     Tobacco Use    Smoking status: Never    Smokeless tobacco: Never   Substance Use Topics    Alcohol use: Never    Drug use: Never       Physical Exam   ED Triage Vitals   Temperature Heart Rate Respirations BP   09/26/24 1313 09/26/24 1313 09/26/24 1313 09/26/24 1313   37.4 °C (99.3 °F) (!) 106 20 (!) 180/98      Pulse Ox Temp Source Heart Rate Source Patient Position   09/26/24 1313 09/26/24 1313 09/26/24 1313 --   97 % Temporal Monitor       BP Location FiO2 (%)     09/26/24 1326 --     Right arm        Physical Exam  Constitutional:       Appearance: She is well-developed.   HENT:      Head: Normocephalic and atraumatic.      Mouth/Throat:      Mouth: Mucous membranes are moist.   Eyes:      Extraocular Movements: Extraocular movements intact.   Cardiovascular:      Rate and Rhythm: Normal rate and regular rhythm.      Heart sounds: Normal heart sounds. No murmur heard.     No friction rub.   Pulmonary:      Effort: Pulmonary effort is normal.   Abdominal:      General: Abdomen is flat and scaphoid. Bowel sounds are increased.      Palpations: Abdomen is rigid.      Tenderness: There is abdominal tenderness in the suprapubic area. Negative signs include Garcia's sign, Rovsing's sign, McBurney's sign, psoas sign and obturator sign.      Hernia: No  hernia is present.   Skin:     General: Skin is warm.      Capillary Refill: Capillary refill takes less than 2 seconds.   Neurological:      General: No focal deficit present.      Mental Status: She is alert.   Psychiatric:         Mood and Affect: Mood normal.         Behavior: Behavior normal.           ED Course & MDM   Diagnoses as of 09/26/24 1542   Dysuria   Dehydration   Hyperglycemia                 No data recorded     Analia Coma Scale Score: 15 (09/26/24 1347 : Lou Knight RN)                           Medical Decision Making  No pain appreciated in the right upper quadrant left upper quadrant left lower quadrant or right lower quadrant.  Her only pain was located at the bladder.  Bladder was not distended during exam.  Urinalysis was ordered CBC CMP and patient received 15 mg Toradol 1 L of fluid.  Patient was now pain-free after the 15 mg of Toradol.  Vital signs rechecked and improved to 137/107.  Her blood glucose was 400.  We gave her 7 units of lispro subcu and rechecked her blood glucose it was 300.  Her potassium on her venous blood gas was 2.7 and our potassium was hemolyzed.  Because I was giving her insulin I want to protect her potassium level and gave her 40 mill equivalent liquid potassium and redrew the potassium.  The redraw of her potassium was only mildly hemolyzed and it was 4.4.  I felt assured with a 4.4 potassium and mild hemolysis since patient was also replaced with 40 mill equivalent liquid potassium.  The lipase was normal.  The kidney function is normal.  The white blood cell count was normal.  The lactate was normal.  The urine had +2 ketones and plus for glucose and she was acutely dehydrated.  We gave her 1 full liter of normal saline and patient understands she needs to continue to hydrate.  I then contacted her PCP and advised of the issue with the Ozempic.  Dr. Dee ED was very helpful and he indicated that he had some samples of Ozempic and she could stop by his  office and  a supply.  Patient was very comfortable with this plan and return precautions reviewed and she was safely discharged home.        Procedure  Procedures     EDER Sawant-CNP  09/26/24 1528       EDER Sawant-DEBRA  09/26/24 1541

## 2024-09-27 ENCOUNTER — APPOINTMENT (OUTPATIENT)
Dept: RADIOLOGY | Facility: HOSPITAL | Age: 54
DRG: 372 | End: 2024-09-27
Payer: COMMERCIAL

## 2024-09-27 ENCOUNTER — HOSPITAL ENCOUNTER (INPATIENT)
Facility: HOSPITAL | Age: 54
LOS: 2 days | Discharge: HOME | DRG: 372 | End: 2024-09-29
Attending: EMERGENCY MEDICINE | Admitting: STUDENT IN AN ORGANIZED HEALTH CARE EDUCATION/TRAINING PROGRAM
Payer: COMMERCIAL

## 2024-09-27 DIAGNOSIS — K52.9 ACUTE COLITIS: Primary | ICD-10-CM

## 2024-09-27 PROBLEM — F32.9 MDD (MAJOR DEPRESSIVE DISORDER): Status: ACTIVE | Noted: 2021-02-26

## 2024-09-27 PROBLEM — R10.9 ABDOMINAL PAIN: Status: ACTIVE | Noted: 2024-09-27

## 2024-09-27 PROBLEM — Z96.651 HISTORY OF TOTAL RIGHT KNEE REPLACEMENT: Status: ACTIVE | Noted: 2022-02-01

## 2024-09-27 PROBLEM — N28.1 CYST OF KIDNEY, ACQUIRED: Status: ACTIVE | Noted: 2024-09-27

## 2024-09-27 PROBLEM — E66.9 OBESITY WITH BODY MASS INDEX 30 OR GREATER: Status: ACTIVE | Noted: 2024-09-27

## 2024-09-27 PROBLEM — E66.01 SEVERE OBESITY (BMI >= 40) (MULTI): Status: ACTIVE | Noted: 2024-09-27

## 2024-09-27 PROBLEM — L03.119 CELLULITIS OF UPPER EXTREMITY: Status: ACTIVE | Noted: 2024-09-27

## 2024-09-27 PROBLEM — F32.9 MAJOR DEPRESSIVE DISORDER: Status: ACTIVE | Noted: 2024-09-27

## 2024-09-27 PROBLEM — Z86.19 HISTORY OF VIRAL INFECTION: Status: ACTIVE | Noted: 2024-09-27

## 2024-09-27 LAB
ALBUMIN SERPL BCP-MCNC: 3.8 G/DL (ref 3.4–5)
ALP SERPL-CCNC: 92 U/L (ref 33–110)
ALT SERPL W P-5'-P-CCNC: 25 U/L (ref 7–45)
ANION GAP BLDV CALCULATED.4IONS-SCNC: 12 MMOL/L (ref 10–25)
ANION GAP SERPL CALC-SCNC: 11 MMOL/L (ref 10–20)
ANION GAP SERPL CALC-SCNC: 17 MMOL/L (ref 10–20)
AST SERPL W P-5'-P-CCNC: 17 U/L (ref 9–39)
BASE EXCESS BLDV CALC-SCNC: -2.2 MMOL/L (ref -2–3)
BASE EXCESS BLDV CALC-SCNC: -5.5 MMOL/L (ref -2–3)
BASOPHILS # BLD AUTO: 0.01 X10*3/UL (ref 0–0.1)
BASOPHILS NFR BLD AUTO: 0.1 %
BILIRUB SERPL-MCNC: 0.5 MG/DL (ref 0–1.2)
BODY TEMPERATURE: ABNORMAL
BODY TEMPERATURE: ABNORMAL
BUN SERPL-MCNC: 10 MG/DL (ref 6–23)
BUN SERPL-MCNC: 13 MG/DL (ref 6–23)
CA-I BLDV-SCNC: 1.14 MMOL/L (ref 1.1–1.33)
CALCIUM SERPL-MCNC: 8.6 MG/DL (ref 8.6–10.3)
CALCIUM SERPL-MCNC: 9.1 MG/DL (ref 8.6–10.3)
CHLORIDE BLDV-SCNC: 105 MMOL/L (ref 98–107)
CHLORIDE SERPL-SCNC: 104 MMOL/L (ref 98–107)
CHLORIDE SERPL-SCNC: 104 MMOL/L (ref 98–107)
CO2 SERPL-SCNC: 19 MMOL/L (ref 21–32)
CO2 SERPL-SCNC: 23 MMOL/L (ref 21–32)
CREAT SERPL-MCNC: 0.7 MG/DL (ref 0.5–1.05)
CREAT SERPL-MCNC: 0.84 MG/DL (ref 0.5–1.05)
CREAT SERPL-MCNC: 0.84 MG/DL (ref 0.5–1.05)
CRP SERPL-MCNC: 15.73 MG/DL
EGFRCR SERPLBLD CKD-EPI 2021: 83 ML/MIN/1.73M*2
EGFRCR SERPLBLD CKD-EPI 2021: 83 ML/MIN/1.73M*2
EGFRCR SERPLBLD CKD-EPI 2021: >90 ML/MIN/1.73M*2
EOSINOPHIL # BLD AUTO: 0 X10*3/UL (ref 0–0.7)
EOSINOPHIL NFR BLD AUTO: 0 %
ERYTHROCYTE [DISTWIDTH] IN BLOOD BY AUTOMATED COUNT: 12.2 % (ref 11.5–14.5)
ERYTHROCYTE [SEDIMENTATION RATE] IN BLOOD BY WESTERGREN METHOD: 10 MM/H (ref 0–30)
FLUAV RNA RESP QL NAA+PROBE: NOT DETECTED
FLUBV RNA RESP QL NAA+PROBE: NOT DETECTED
GLUCOSE BLD MANUAL STRIP-MCNC: 169 MG/DL (ref 74–99)
GLUCOSE BLD MANUAL STRIP-MCNC: 187 MG/DL (ref 74–99)
GLUCOSE BLD MANUAL STRIP-MCNC: 190 MG/DL (ref 74–99)
GLUCOSE BLD MANUAL STRIP-MCNC: 231 MG/DL (ref 74–99)
GLUCOSE BLD MANUAL STRIP-MCNC: 267 MG/DL (ref 74–99)
GLUCOSE BLD MANUAL STRIP-MCNC: 295 MG/DL (ref 74–99)
GLUCOSE BLDV-MCNC: 274 MG/DL (ref 74–99)
GLUCOSE SERPL-MCNC: 266 MG/DL (ref 74–99)
GLUCOSE SERPL-MCNC: 326 MG/DL (ref 74–99)
HCO3 BLDV-SCNC: 21.6 MMOL/L (ref 22–26)
HCO3 BLDV-SCNC: 24.2 MMOL/L (ref 22–26)
HCT VFR BLD AUTO: 41.8 % (ref 36–46)
HCT VFR BLD EST: 47 % (ref 36–46)
HGB BLD-MCNC: 14.2 G/DL (ref 12–16)
HGB BLDV-MCNC: 15.5 G/DL (ref 12–16)
HOLD SPECIMEN: NORMAL
IMM GRANULOCYTES # BLD AUTO: 0.03 X10*3/UL (ref 0–0.7)
IMM GRANULOCYTES NFR BLD AUTO: 0.4 % (ref 0–0.9)
INHALED O2 CONCENTRATION: 0 %
INHALED O2 CONCENTRATION: 21 %
LACTATE BLDV-SCNC: 1.4 MMOL/L (ref 0.4–2)
LYMPHOCYTES # BLD AUTO: 0.94 X10*3/UL (ref 1.2–4.8)
LYMPHOCYTES NFR BLD AUTO: 11.3 %
MAGNESIUM SERPL-MCNC: 1.25 MG/DL (ref 1.6–2.4)
MAGNESIUM SERPL-MCNC: 1.68 MG/DL (ref 1.6–2.4)
MCH RBC QN AUTO: 30.8 PG (ref 26–34)
MCHC RBC AUTO-ENTMCNC: 34 G/DL (ref 32–36)
MCV RBC AUTO: 91 FL (ref 80–100)
MONOCYTES # BLD AUTO: 0.42 X10*3/UL (ref 0.1–1)
MONOCYTES NFR BLD AUTO: 5 %
NEUTROPHILS # BLD AUTO: 6.95 X10*3/UL (ref 1.2–7.7)
NEUTROPHILS NFR BLD AUTO: 83.2 %
NRBC BLD-RTO: 0 /100 WBCS (ref 0–0)
OXYHGB MFR BLDV: 55.5 % (ref 45–75)
OXYHGB MFR BLDV: 67.9 % (ref 45–75)
PCO2 BLDV: 47 MM HG (ref 41–51)
PCO2 BLDV: 47 MM HG (ref 41–51)
PH BLDV: 7.27 PH (ref 7.33–7.43)
PH BLDV: 7.32 PH (ref 7.33–7.43)
PLATELET # BLD AUTO: 194 X10*3/UL (ref 150–450)
PO2 BLDV: 32 MM HG (ref 35–45)
PO2 BLDV: 41 MM HG (ref 35–45)
POTASSIUM BLDV-SCNC: 4.3 MMOL/L (ref 3.5–5.3)
POTASSIUM SERPL-SCNC: 4.2 MMOL/L (ref 3.5–5.3)
POTASSIUM SERPL-SCNC: 4.2 MMOL/L (ref 3.5–5.3)
PROT SERPL-MCNC: 6.5 G/DL (ref 6.4–8.2)
RBC # BLD AUTO: 4.61 X10*6/UL (ref 4–5.2)
SAO2 % BLDV: 57 % (ref 45–75)
SAO2 % BLDV: 70 % (ref 45–75)
SARS-COV-2 RNA RESP QL NAA+PROBE: NOT DETECTED
SODIUM BLDV-SCNC: 134 MMOL/L (ref 136–145)
SODIUM SERPL-SCNC: 134 MMOL/L (ref 136–145)
SODIUM SERPL-SCNC: 136 MMOL/L (ref 136–145)
WBC # BLD AUTO: 8.4 X10*3/UL (ref 4.4–11.3)

## 2024-09-27 PROCEDURE — 85652 RBC SED RATE AUTOMATED: CPT

## 2024-09-27 PROCEDURE — 87506 IADNA-DNA/RNA PROBE TQ 6-11: CPT | Mod: GEALAB

## 2024-09-27 PROCEDURE — 87636 SARSCOV2 & INF A&B AMP PRB: CPT | Performed by: EMERGENCY MEDICINE

## 2024-09-27 PROCEDURE — 2500000001 HC RX 250 WO HCPCS SELF ADMINISTERED DRUGS (ALT 637 FOR MEDICARE OP)

## 2024-09-27 PROCEDURE — 2550000001 HC RX 255 CONTRASTS: Performed by: EMERGENCY MEDICINE

## 2024-09-27 PROCEDURE — 85025 COMPLETE CBC W/AUTO DIFF WBC: CPT | Performed by: EMERGENCY MEDICINE

## 2024-09-27 PROCEDURE — 82330 ASSAY OF CALCIUM: CPT | Performed by: INTERNAL MEDICINE

## 2024-09-27 PROCEDURE — 96375 TX/PRO/DX INJ NEW DRUG ADDON: CPT

## 2024-09-27 PROCEDURE — 2500000004 HC RX 250 GENERAL PHARMACY W/ HCPCS (ALT 636 FOR OP/ED)

## 2024-09-27 PROCEDURE — 87493 C DIFF AMPLIFIED PROBE: CPT | Mod: GEALAB

## 2024-09-27 PROCEDURE — 99222 1ST HOSP IP/OBS MODERATE 55: CPT

## 2024-09-27 PROCEDURE — 96372 THER/PROPH/DIAG INJ SC/IM: CPT

## 2024-09-27 PROCEDURE — 36415 COLL VENOUS BLD VENIPUNCTURE: CPT | Performed by: EMERGENCY MEDICINE

## 2024-09-27 PROCEDURE — 2500000002 HC RX 250 W HCPCS SELF ADMINISTERED DRUGS (ALT 637 FOR MEDICARE OP, ALT 636 FOR OP/ED)

## 2024-09-27 PROCEDURE — 96361 HYDRATE IV INFUSION ADD-ON: CPT

## 2024-09-27 PROCEDURE — 74177 CT ABD & PELVIS W/CONTRAST: CPT | Performed by: RADIOLOGY

## 2024-09-27 PROCEDURE — 82805 BLOOD GASES W/O2 SATURATION: CPT

## 2024-09-27 PROCEDURE — 82947 ASSAY GLUCOSE BLOOD QUANT: CPT

## 2024-09-27 PROCEDURE — 96365 THER/PROPH/DIAG IV INF INIT: CPT | Mod: 59

## 2024-09-27 PROCEDURE — 96360 HYDRATION IV INFUSION INIT: CPT | Mod: 59

## 2024-09-27 PROCEDURE — 2500000002 HC RX 250 W HCPCS SELF ADMINISTERED DRUGS (ALT 637 FOR MEDICARE OP, ALT 636 FOR OP/ED): Performed by: INTERNAL MEDICINE

## 2024-09-27 PROCEDURE — 96366 THER/PROPH/DIAG IV INF ADDON: CPT

## 2024-09-27 PROCEDURE — 82565 ASSAY OF CREATININE: CPT | Performed by: EMERGENCY MEDICINE

## 2024-09-27 PROCEDURE — 94760 N-INVAS EAR/PLS OXIMETRY 1: CPT

## 2024-09-27 PROCEDURE — 86140 C-REACTIVE PROTEIN: CPT

## 2024-09-27 PROCEDURE — 1100000001 HC PRIVATE ROOM DAILY

## 2024-09-27 PROCEDURE — 83993 ASSAY FOR CALPROTECTIN FECAL: CPT

## 2024-09-27 PROCEDURE — 83735 ASSAY OF MAGNESIUM: CPT | Performed by: EMERGENCY MEDICINE

## 2024-09-27 PROCEDURE — 74177 CT ABD & PELVIS W/CONTRAST: CPT

## 2024-09-27 PROCEDURE — 99285 EMERGENCY DEPT VISIT HI MDM: CPT | Mod: 25

## 2024-09-27 PROCEDURE — 83735 ASSAY OF MAGNESIUM: CPT

## 2024-09-27 PROCEDURE — 2500000004 HC RX 250 GENERAL PHARMACY W/ HCPCS (ALT 636 FOR OP/ED): Performed by: EMERGENCY MEDICINE

## 2024-09-27 PROCEDURE — 82565 ASSAY OF CREATININE: CPT | Performed by: INTERNAL MEDICINE

## 2024-09-27 RX ORDER — DEXTROSE 50 % IN WATER (D50W) INTRAVENOUS SYRINGE
25
Status: DISCONTINUED | OUTPATIENT
Start: 2024-09-27 | End: 2024-09-29 | Stop reason: HOSPADM

## 2024-09-27 RX ORDER — MORPHINE SULFATE 4 MG/ML
INJECTION INTRAVENOUS
Status: COMPLETED
Start: 2024-09-27 | End: 2024-09-27

## 2024-09-27 RX ORDER — DIPHENHYDRAMINE HYDROCHLORIDE 50 MG/ML
50 INJECTION INTRAMUSCULAR; INTRAVENOUS ONCE
Status: COMPLETED | OUTPATIENT
Start: 2024-09-27 | End: 2024-09-27

## 2024-09-27 RX ORDER — INSULIN LISPRO 100 [IU]/ML
0-10 INJECTION, SOLUTION INTRAVENOUS; SUBCUTANEOUS
Status: DISCONTINUED | OUTPATIENT
Start: 2024-09-27 | End: 2024-09-27

## 2024-09-27 RX ORDER — METOCLOPRAMIDE HYDROCHLORIDE 5 MG/ML
10 INJECTION INTRAMUSCULAR; INTRAVENOUS ONCE
Status: COMPLETED | OUTPATIENT
Start: 2024-09-27 | End: 2024-09-27

## 2024-09-27 RX ORDER — DEXTROSE 50 % IN WATER (D50W) INTRAVENOUS SYRINGE
12.5
Status: DISCONTINUED | OUTPATIENT
Start: 2024-09-27 | End: 2024-09-29 | Stop reason: HOSPADM

## 2024-09-27 RX ORDER — ENOXAPARIN SODIUM 100 MG/ML
40 INJECTION SUBCUTANEOUS EVERY 24 HOURS
Status: DISCONTINUED | OUTPATIENT
Start: 2024-09-27 | End: 2024-09-29 | Stop reason: HOSPADM

## 2024-09-27 RX ORDER — ONDANSETRON HYDROCHLORIDE 2 MG/ML
4 INJECTION, SOLUTION INTRAVENOUS ONCE
Status: COMPLETED | OUTPATIENT
Start: 2024-09-27 | End: 2024-09-27

## 2024-09-27 RX ORDER — ONDANSETRON HYDROCHLORIDE 2 MG/ML
INJECTION, SOLUTION INTRAVENOUS
Status: COMPLETED
Start: 2024-09-27 | End: 2024-09-27

## 2024-09-27 RX ORDER — BUSPIRONE HYDROCHLORIDE 15 MG/1
15 TABLET ORAL 3 TIMES DAILY
Status: DISCONTINUED | OUTPATIENT
Start: 2024-09-27 | End: 2024-09-29 | Stop reason: HOSPADM

## 2024-09-27 RX ORDER — OXYCODONE HYDROCHLORIDE 5 MG/1
5 TABLET ORAL EVERY 6 HOURS PRN
Status: DISCONTINUED | OUTPATIENT
Start: 2024-09-27 | End: 2024-09-29 | Stop reason: HOSPADM

## 2024-09-27 RX ORDER — ROPINIROLE 0.25 MG/1
0.5 TABLET, FILM COATED ORAL NIGHTLY
Status: DISCONTINUED | OUTPATIENT
Start: 2024-09-27 | End: 2024-09-29 | Stop reason: HOSPADM

## 2024-09-27 RX ORDER — ONDANSETRON HYDROCHLORIDE 2 MG/ML
4 INJECTION, SOLUTION INTRAVENOUS EVERY 6 HOURS PRN
Status: DISCONTINUED | OUTPATIENT
Start: 2024-09-27 | End: 2024-09-29 | Stop reason: HOSPADM

## 2024-09-27 RX ORDER — ESCITALOPRAM OXALATE 20 MG/1
20 TABLET ORAL DAILY
Status: DISCONTINUED | OUTPATIENT
Start: 2024-09-27 | End: 2024-09-29 | Stop reason: HOSPADM

## 2024-09-27 RX ORDER — SODIUM CHLORIDE 9 MG/ML
125 INJECTION, SOLUTION INTRAVENOUS CONTINUOUS
Status: DISCONTINUED | OUTPATIENT
Start: 2024-09-27 | End: 2024-09-29 | Stop reason: HOSPADM

## 2024-09-27 RX ORDER — MAGNESIUM SULFATE HEPTAHYDRATE 40 MG/ML
2 INJECTION, SOLUTION INTRAVENOUS ONCE
Status: COMPLETED | OUTPATIENT
Start: 2024-09-27 | End: 2024-09-27

## 2024-09-27 RX ORDER — SEMAGLUTIDE 1.34 MG/ML
1 INJECTION, SOLUTION SUBCUTANEOUS
Qty: 1 EACH | Refills: 0 | COMMUNITY
Start: 2024-09-29 | End: 2025-09-29

## 2024-09-27 RX ORDER — MORPHINE SULFATE 4 MG/ML
4 INJECTION INTRAVENOUS ONCE
Status: COMPLETED | OUTPATIENT
Start: 2024-09-27 | End: 2024-09-27

## 2024-09-27 RX ORDER — HYDROXYZINE PAMOATE 25 MG/1
50 CAPSULE ORAL EVERY 6 HOURS PRN
Status: DISCONTINUED | OUTPATIENT
Start: 2024-09-27 | End: 2024-09-29 | Stop reason: HOSPADM

## 2024-09-27 RX ORDER — INSULIN LISPRO 100 [IU]/ML
0-5 INJECTION, SOLUTION INTRAVENOUS; SUBCUTANEOUS
Status: DISCONTINUED | OUTPATIENT
Start: 2024-09-27 | End: 2024-09-27

## 2024-09-27 RX ORDER — ACETAMINOPHEN 325 MG/1
650 TABLET ORAL EVERY 6 HOURS PRN
Status: DISCONTINUED | OUTPATIENT
Start: 2024-09-27 | End: 2024-09-29 | Stop reason: HOSPADM

## 2024-09-27 RX ORDER — INSULIN LISPRO 100 [IU]/ML
0-15 INJECTION, SOLUTION INTRAVENOUS; SUBCUTANEOUS
Status: DISCONTINUED | OUTPATIENT
Start: 2024-09-27 | End: 2024-09-29 | Stop reason: HOSPADM

## 2024-09-27 RX ADMIN — INSULIN LISPRO 4 UNITS: 100 INJECTION, SOLUTION INTRAVENOUS; SUBCUTANEOUS at 08:12

## 2024-09-27 RX ADMIN — ENOXAPARIN SODIUM 40 MG: 40 INJECTION SUBCUTANEOUS at 08:12

## 2024-09-27 RX ADMIN — BUSPIRONE HYDROCHLORIDE 15 MG: 15 TABLET ORAL at 20:46

## 2024-09-27 RX ADMIN — BUSPIRONE HYDROCHLORIDE 15 MG: 15 TABLET ORAL at 14:43

## 2024-09-27 RX ADMIN — ONDANSETRON 4 MG: 2 INJECTION, SOLUTION INTRAMUSCULAR; INTRAVENOUS at 08:36

## 2024-09-27 RX ADMIN — SODIUM CHLORIDE 125 ML/HR: 9 INJECTION, SOLUTION INTRAVENOUS at 06:50

## 2024-09-27 RX ADMIN — ONDANSETRON 4 MG: 2 INJECTION, SOLUTION INTRAMUSCULAR; INTRAVENOUS at 02:31

## 2024-09-27 RX ADMIN — DIPHENHYDRAMINE HYDROCHLORIDE 50 MG: 50 INJECTION, SOLUTION INTRAMUSCULAR; INTRAVENOUS at 04:55

## 2024-09-27 RX ADMIN — ACETAMINOPHEN 650 MG: 325 TABLET ORAL at 14:24

## 2024-09-27 RX ADMIN — HYDROXYZINE PAMOATE 50 MG: 25 CAPSULE ORAL at 08:36

## 2024-09-27 RX ADMIN — MORPHINE SULFATE: 4 INJECTION INTRAVENOUS at 02:33

## 2024-09-27 RX ADMIN — IOHEXOL 75 ML: 350 INJECTION, SOLUTION INTRAVENOUS at 03:22

## 2024-09-27 RX ADMIN — ONDANSETRON HYDROCHLORIDE 4 MG: 2 INJECTION, SOLUTION INTRAVENOUS at 02:31

## 2024-09-27 RX ADMIN — BUSPIRONE HYDROCHLORIDE 15 MG: 15 TABLET ORAL at 08:36

## 2024-09-27 RX ADMIN — ESCITALOPRAM OXALATE 20 MG: 20 TABLET ORAL at 08:36

## 2024-09-27 RX ADMIN — OXYCODONE HYDROCHLORIDE 5 MG: 5 TABLET ORAL at 10:50

## 2024-09-27 RX ADMIN — INSULIN LISPRO 3 UNITS: 100 INJECTION, SOLUTION INTRAVENOUS; SUBCUTANEOUS at 06:50

## 2024-09-27 RX ADMIN — MAGNESIUM SULFATE HEPTAHYDRATE 2 G: 2 INJECTION, SOLUTION INTRAVENOUS at 04:55

## 2024-09-27 RX ADMIN — ONDANSETRON 4 MG: 2 INJECTION, SOLUTION INTRAMUSCULAR; INTRAVENOUS at 14:34

## 2024-09-27 RX ADMIN — MORPHINE SULFATE 4 MG: 4 INJECTION INTRAVENOUS at 02:29

## 2024-09-27 RX ADMIN — ONDANSETRON 4 MG: 2 INJECTION, SOLUTION INTRAMUSCULAR; INTRAVENOUS at 22:31

## 2024-09-27 RX ADMIN — OXYCODONE HYDROCHLORIDE 5 MG: 5 TABLET ORAL at 18:05

## 2024-09-27 RX ADMIN — METOCLOPRAMIDE 10 MG: 5 INJECTION, SOLUTION INTRAMUSCULAR; INTRAVENOUS at 04:54

## 2024-09-27 RX ADMIN — ROPINIROLE HYDROCHLORIDE 0.5 MG: 0.25 TABLET, FILM COATED ORAL at 20:46

## 2024-09-27 SDOH — SOCIAL STABILITY: SOCIAL INSECURITY: ABUSE: ADULT

## 2024-09-27 SDOH — SOCIAL STABILITY: SOCIAL INSECURITY: DOES ANYONE TRY TO KEEP YOU FROM HAVING/CONTACTING OTHER FRIENDS OR DOING THINGS OUTSIDE YOUR HOME?: NO

## 2024-09-27 SDOH — ECONOMIC STABILITY: FOOD INSECURITY: WITHIN THE PAST 12 MONTHS, YOU WORRIED THAT YOUR FOOD WOULD RUN OUT BEFORE YOU GOT MONEY TO BUY MORE.: PATIENT DECLINED

## 2024-09-27 SDOH — SOCIAL STABILITY: SOCIAL INSECURITY: DO YOU FEEL UNSAFE GOING BACK TO THE PLACE WHERE YOU ARE LIVING?: NO

## 2024-09-27 SDOH — SOCIAL STABILITY: SOCIAL INSECURITY: WERE YOU ABLE TO COMPLETE ALL THE BEHAVIORAL HEALTH SCREENINGS?: YES

## 2024-09-27 SDOH — ECONOMIC STABILITY: FOOD INSECURITY
WITHIN THE PAST 12 MONTHS, YOU WORRIED THAT YOUR FOOD WOULD RUN OUT BEFORE YOU GOT THE MONEY TO BUY MORE.: PATIENT DECLINED

## 2024-09-27 SDOH — ECONOMIC STABILITY: FOOD INSECURITY: WITHIN THE PAST 12 MONTHS, THE FOOD YOU BOUGHT JUST DIDN'T LAST AND YOU DIDN'T HAVE MONEY TO GET MORE.: PATIENT DECLINED

## 2024-09-27 SDOH — SOCIAL STABILITY: SOCIAL INSECURITY
WITHIN THE LAST YEAR, HAVE YOU BEEN RAPED OR FORCED TO HAVE ANY KIND OF SEXUAL ACTIVITY BY YOUR PARTNER OR EX-PARTNER?: PATIENT DECLINED

## 2024-09-27 SDOH — SOCIAL STABILITY: SOCIAL INSECURITY
WITHIN THE LAST YEAR, HAVE YOU BEEN HUMILIATED OR EMOTIONALLY ABUSED IN OTHER WAYS BY YOUR PARTNER OR EX-PARTNER?: PATIENT DECLINED

## 2024-09-27 SDOH — SOCIAL STABILITY: SOCIAL INSECURITY
WITHIN THE LAST YEAR, HAVE YOU BEEN KICKED, HIT, SLAPPED, OR OTHERWISE PHYSICALLY HURT BY YOUR PARTNER OR EX-PARTNER?: PATIENT DECLINED

## 2024-09-27 SDOH — SOCIAL STABILITY: SOCIAL INSECURITY: WITHIN THE LAST YEAR, HAVE YOU BEEN AFRAID OF YOUR PARTNER OR EX-PARTNER?: PATIENT DECLINED

## 2024-09-27 SDOH — SOCIAL STABILITY: SOCIAL INSECURITY: HAVE YOU HAD THOUGHTS OF HARMING ANYONE ELSE?: NO

## 2024-09-27 SDOH — ECONOMIC STABILITY: INCOME INSECURITY
IN THE PAST 12 MONTHS HAS THE ELECTRIC, GAS, OIL, OR WATER COMPANY THREATENED TO SHUT OFF SERVICES IN YOUR HOME?: PATIENT DECLINED

## 2024-09-27 SDOH — SOCIAL STABILITY: SOCIAL INSECURITY
WITHIN THE LAST YEAR, HAVE TO BEEN RAPED OR FORCED TO HAVE ANY KIND OF SEXUAL ACTIVITY BY YOUR PARTNER OR EX-PARTNER?: PATIENT DECLINED

## 2024-09-27 SDOH — SOCIAL STABILITY: SOCIAL INSECURITY: DO YOU FEEL ANYONE HAS EXPLOITED OR TAKEN ADVANTAGE OF YOU FINANCIALLY OR OF YOUR PERSONAL PROPERTY?: NO

## 2024-09-27 SDOH — SOCIAL STABILITY: SOCIAL INSECURITY: ARE YOU OR HAVE YOU BEEN THREATENED OR ABUSED PHYSICALLY, EMOTIONALLY, OR SEXUALLY BY ANYONE?: NO

## 2024-09-27 SDOH — SOCIAL STABILITY: SOCIAL INSECURITY: ARE THERE ANY APPARENT SIGNS OF INJURIES/BEHAVIORS THAT COULD BE RELATED TO ABUSE/NEGLECT?: NO

## 2024-09-27 SDOH — ECONOMIC STABILITY: INCOME INSECURITY
IN THE PAST 12 MONTHS, HAS THE ELECTRIC, GAS, OIL, OR WATER COMPANY THREATENED TO SHUT OFF SERVICE IN YOUR HOME?: PATIENT DECLINED

## 2024-09-27 SDOH — SOCIAL STABILITY: SOCIAL INSECURITY: HAS ANYONE EVER THREATENED TO HURT YOUR FAMILY OR YOUR PETS?: NO

## 2024-09-27 ASSESSMENT — PAIN DESCRIPTION - ORIENTATION: ORIENTATION: LOWER

## 2024-09-27 ASSESSMENT — ACTIVITIES OF DAILY LIVING (ADL)
HEARING - LEFT EAR: FUNCTIONAL
BATHING: INDEPENDENT
FEEDING YOURSELF: INDEPENDENT
WALKS IN HOME: INDEPENDENT
JUDGMENT_ADEQUATE_SAFELY_COMPLETE_DAILY_ACTIVITIES: YES
GROOMING: INDEPENDENT
TOILETING: INDEPENDENT
HEARING - RIGHT EAR: FUNCTIONAL
LACK_OF_TRANSPORTATION: NO
LACK_OF_TRANSPORTATION: NO
PATIENT'S MEMORY ADEQUATE TO SAFELY COMPLETE DAILY ACTIVITIES?: YES
ADEQUATE_TO_COMPLETE_ADL: YES
DRESSING YOURSELF: INDEPENDENT
ASSISTIVE_DEVICE: DENTURES LOWER;DENTURES UPPER

## 2024-09-27 ASSESSMENT — LIFESTYLE VARIABLES
EVER FELT BAD OR GUILTY ABOUT YOUR DRINKING: NO
AUDIT-C TOTAL SCORE: 0
SKIP TO QUESTIONS 9-10: 1
HAVE YOU EVER FELT YOU SHOULD CUT DOWN ON YOUR DRINKING: NO
HOW MANY STANDARD DRINKS CONTAINING ALCOHOL DO YOU HAVE ON A TYPICAL DAY: PATIENT DOES NOT DRINK
HOW OFTEN DO YOU HAVE 6 OR MORE DRINKS ON ONE OCCASION: NEVER
HAVE PEOPLE ANNOYED YOU BY CRITICIZING YOUR DRINKING: NO
HOW OFTEN DO YOU HAVE A DRINK CONTAINING ALCOHOL: NEVER
TOTAL SCORE: 0
EVER HAD A DRINK FIRST THING IN THE MORNING TO STEADY YOUR NERVES TO GET RID OF A HANGOVER: NO
AUDIT-C TOTAL SCORE: 0

## 2024-09-27 ASSESSMENT — COGNITIVE AND FUNCTIONAL STATUS - GENERAL
DAILY ACTIVITIY SCORE: 24
PATIENT BASELINE BEDBOUND: NO
TOILETING: A LITTLE
MOBILITY SCORE: 24
MOBILITY SCORE: 24
DAILY ACTIVITIY SCORE: 23

## 2024-09-27 ASSESSMENT — PAIN - FUNCTIONAL ASSESSMENT
PAIN_FUNCTIONAL_ASSESSMENT: 0-10

## 2024-09-27 ASSESSMENT — COLUMBIA-SUICIDE SEVERITY RATING SCALE - C-SSRS
2. HAVE YOU ACTUALLY HAD ANY THOUGHTS OF KILLING YOURSELF?: NO
1. IN THE PAST MONTH, HAVE YOU WISHED YOU WERE DEAD OR WISHED YOU COULD GO TO SLEEP AND NOT WAKE UP?: NO
6. HAVE YOU EVER DONE ANYTHING, STARTED TO DO ANYTHING, OR PREPARED TO DO ANYTHING TO END YOUR LIFE?: NO

## 2024-09-27 ASSESSMENT — PAIN SCALES - GENERAL
PAINLEVEL_OUTOF10: 0 - NO PAIN
PAINLEVEL_OUTOF10: 8
PAINLEVEL_OUTOF10: 9
PAINLEVEL_OUTOF10: 5 - MODERATE PAIN
PAINLEVEL_OUTOF10: 3
PAINLEVEL_OUTOF10: 8

## 2024-09-27 ASSESSMENT — PAIN DESCRIPTION - LOCATION
LOCATION: ABDOMEN
LOCATION: ABDOMEN

## 2024-09-27 ASSESSMENT — PAIN DESCRIPTION - DESCRIPTORS
DESCRIPTORS: ACHING
DESCRIPTORS: ACHING

## 2024-09-27 ASSESSMENT — PAIN DESCRIPTION - FREQUENCY: FREQUENCY: CONSTANT/CONTINUOUS

## 2024-09-27 ASSESSMENT — PATIENT HEALTH QUESTIONNAIRE - PHQ9
1. LITTLE INTEREST OR PLEASURE IN DOING THINGS: NOT AT ALL
SUM OF ALL RESPONSES TO PHQ9 QUESTIONS 1 & 2: 0
2. FEELING DOWN, DEPRESSED OR HOPELESS: NOT AT ALL

## 2024-09-27 ASSESSMENT — PAIN DESCRIPTION - PAIN TYPE: TYPE: ACUTE PAIN

## 2024-09-27 ASSESSMENT — PAIN DESCRIPTION - ONSET: ONSET: SUDDEN

## 2024-09-27 NOTE — SIGNIFICANT EVENT
This AM, patient was severely anxious. Home psych meds were ordered and patient calmed down. History reconfirmed with patient and daughter at bedside. Patient currently concerned with her anxiety, but appears to be well improved. No abdominal pain currently.    Updated Assessment and Plan    Razia Gunn is a 54 y.o. female with a Medical History of T2DM, HLD, RLS,  and anxiety disorder who presented on account of abdominal pain and is admitted for hyperglycemia and enterocolitis vs viral gastroenteritis.    Acute Medical Issues  #Enterocolitis vs viral gastroenteritis  #C diff rule out  - Will restart diet as tolerated  - Pending C. Diff and stool pathogen PCR  - Pending fecal calprotectin  - CRP 15.73, pending ESR  - Continuing maintenance IVF   - Zofran PRN for nausea control    #Hyperglycemia  #History of T2DM  - Improving with insulin  - SSI in place  - VBG showing improvement of acidosis, no gap, decreased concern for DKA    #Hypomagnesemia  - Repeat mag check WNL  - Will check tomorrow    Chronic Medical Issues  #Anxiety: buspar 15 mg, Lexapro 20mg (hold, patient is NPO)  #restless leg syndrome: ropinirole 0.5 ( hold patient is NPO)  #Hyperlipidemia: not on medications    #T2DM:  last A1c 8.7, metformin 500 mg, Ozempic 0.25 (will hold), ISS     F: N S 125 mL/h  E: Replete as needed  N: NPO  G: None  VTE: Lovenox     Code:   Full Code  Extended Emergency Contact Information  Primary Emergency Contact: Gamaliel Gunn  Home Phone: [phone]  Relation: Spouse     Dispo: Pending clinical improvement and further workup.

## 2024-09-27 NOTE — H&P
Internal Medicine History and Physical Note            Patient: Razia Gunn     Age: 54 y.o.     SEX: female     MRN: [id number]      ROOM: 224      Code: Full Code  Admitted On: 9/27/2024   PCP: Mary Mena PA-C          Attending: Goyo Mckoy MD      Subjective:      Chief Complaint   Patient presents with    Abdominal Pain    multiple     multiple     History of Presenting Illness:    Razia Gunn is a 54 y.o. female with a Medical History of T2DM, HLD, RLS,  and anxiety disorder who presented on account of abdominal pain.      Patient was intially in the ED earlier in the evening yesterday, after complaining of abdominal pian she received insulin and IVF prior to discharged home from the ED. However after eating dinner she started feeling unwell, checked her blood sugars, which was in the 400s. She took her Ozempic without any respite. So she decided to call EMS again and return to the ED. En route To the hospital the patient received 50 mcg of fentanyl and 4 mg of Zofran. She described her abdominal pain as sharp, intermitted 7-8/10 intensity, aggravated by movement, but relieved by use of opioids. There is associated history of nausea with no vomiting.  She also endorses diarrhea, reports having about 6 nonbloody bowel movements after initial discharge from the ED.  Patient reports that she is experienced low-grade fever and chills.  She however denies chest pain diaphoresis palpitation, dyspnea, lightheadedness.  She has no FND no photophobia, no changes in speech, or swallowing.  Her last meal was yesterday night when she had milk and crackers for dinner.    Of note, initial ED presentation was significant for sodium of 133, blood glucose of 398, anion gap of 15.  Urine analysis showed glucose 4+, ketones 2+. Patient currently taking ozempic, and has had several past episodes of colitis in Feb, march and June of this year.  Investigation done at her last admission revealed increased  fecal calprotectin 89, stool pathogen indicative of rotavirus, elevated C-reactive protein, celiac panel unremarkable.  Upon discharge she was asked to follow-up with gastroenterology outpatient.    ED course:   Vitals;   T 37.2 RR 18 , /101 SPO2 95% on room air    Labs;   CBC: Unremarkable  CMP: Blood glucose 323, bicarb 19, magnesium 1.25, anion gap 18  -VBG:    Imaging:   CTAbd: Diffuse colonic wall thickening with adjacent inflammatory stranding compatible with colitis.  The liver is diffusely decreased in attenuation, which may be seen with hepatic steatosis.    Covid/flu: Negative COVID and flu  UA: Pending    Intervention: Morphine 4 mg, metoclopramide 10 mg, ondansetron 4 mg, sodium chloride 1 L, IVF infusions  mL/h.  Magnesium    ROS: 12 points review of system is negative except as stated in the HPI above.      Past Medical History:    Past Medical History:  01/20/2015: Acute candidiasis of vulva and vagina      Comment:  Yeast vaginitis  12/18/2014: Acute laryngitis      Comment:  Acute laryngitis  03/08/2020: Acute maxillary sinusitis, unspecified      Comment:  Acute maxillary sinusitis  12/20/2017: Candidiasis, unspecified      Comment:  Yeast infection  No date: Depression  No date: Diabetes mellitus (Multi)  No date: Other conditions influencing health status      Comment:  History of burning on urination  09/30/2016: Other conditions influencing health status      Comment:  Cyst  03/06/2014: Other conditions influencing health status      Comment:  History of dyspareunia  07/22/2021: Other displaced fracture of base of first metacarpal bone,   left hand, initial encounter for closed fracture      Comment:  Closed displaced fracture of base of first metacarpal                bone of left hand, unspecified fracture morphology,                initial encounter  01/23/2017: Pelvic and perineal pain      Comment:  Pelvic pain in female  10/03/2016: Personal history of other benign  neoplasm      Comment:  History of uterine leiomyoma  01/23/2017: Personal history of other diseases of the circulatory   system      Comment:  History of hypertension  12/08/2015: Personal history of other diseases of the musculoskeletal   system and connective tissue      Comment:  History of low back pain  02/24/2020: Personal history of other diseases of the respiratory   system      Comment:  History of influenza  09/26/2018: Personal history of other diseases of the respiratory   system      Comment:  History of acute bronchitis  02/15/2014: Personal history of other diseases of the respiratory   system      Comment:  Personal history of acute sinusitis  04/22/2019: Personal history of other diseases of the respiratory   system      Comment:  History of acute bronchitis  11/21/2017: Personal history of other diseases of the respiratory   system      Comment:  History of acute sinusitis  08/22/2017: Personal history of other diseases of urinary system      Comment:  History of cystocele  10/16/2014: Personal history of other endocrine, nutritional and   metabolic disease      Comment:  History of type 2 diabetes mellitus  05/28/2013: Personal history of other mental and behavioral disorders      Comment:  History of depression  03/25/2014: Personal history of other specified conditions      Comment:  History of chest pain  08/22/2017: Personal history of other specified conditions      Comment:  History of dysuria  03/19/2015: Personal history of other specified conditions      Comment:  History of diarrhea  10/10/2014: Personal history of other specified conditions      Comment:  History of abdominal pain  03/20/2020: Phlebitis and thrombophlebitis of other sites      Comment:  Thrombophlebitis arm  03/20/2020: Phlebitis and thrombophlebitis of other sites      Comment:  Phlebitis of arm  11/01/2013: Right lower quadrant pain      Comment:  Abdominal pain, RLQ (right lower quadrant)  12/21/2015: Unspecified  condition associated with female genital   organs and menstrual cycle      Comment:  Adnexal cyst  10/17/2016: Unspecified symptoms and signs involving the   genitourinary system      Comment:  UTI symptoms     Allergies   Allergen Reactions    Ceftriaxone Itching    Dapagliflozin Unknown        Meds:   Medications Prior to Admission   Medication Sig Dispense Refill Last Dose    busPIRone (Buspar) 15 mg tablet Take 1 tablet (15 mg) by mouth 3 times a day.   9/26/2024 at pm    escitalopram (Lexapro) 20 mg tablet Take 1 tablet (20 mg) by mouth once daily.   Past Week    hydrOXYzine pamoate (Vistaril) 50 mg capsule Take 1 capsule (50 mg) by mouth once daily at bedtime.   9/26/2024 at pm    metFORMIN (Glucophage) 500 mg tablet TAKE 1 TABLET BY MOUTH WITH WITH LUNCH AND SUPPER 180 tablet 3 9/26/2024 at pm    rOPINIRole (Requip) 0.5 mg tablet Take 1 tablet (0.5 mg) by mouth once daily at bedtime. 90 tablet 3 9/26/2024 at pm    semaglutide (Ozempic) 1 mg/dose (2 mg/1.5 mL) pen injector Inject 1 mg under the skin 1 (one) time per week. 3 each 3 9/26/2024    acetaminophen (Tylenol) 325 mg tablet Take 1 tablet (325 mg) by mouth every 6 hours if needed for mild pain (1 - 3).       CHOLECALCIFEROL, VITAMIN D3, ORAL Take by mouth once daily.       CRANBERRY ORAL Take 1 tablet by mouth once daily.       CYANOCOBALAMIN, VITAMIN B-12, ORAL Place 1 tablet under the tongue once daily.       dicyclomine (Bentyl) 20 mg tablet Take 0.5 tablets (10 mg) by mouth 4 times a day as needed (abdominal pain). (Patient not taking: Reported on 9/27/2024) 30 tablet 0 Not Taking    naproxen (Naprosyn) 500 mg tablet Take 1 tablet (500 mg) by mouth 2 times daily (morning and late afternoon) for 10 days. (Patient not taking: Reported on 9/27/2024) 20 tablet 0 Not Taking    phenazopyridine (Pyridium) 100 mg tablet Take 1 tablet (100 mg) by mouth 3 times a day as needed for bladder spasms for up to 3 days. (Patient not taking: Reported on 9/27/2024) 10  tablet 0 Not Taking     Past surgical history:      Past Surgical History:   Procedure Laterality Date    CHOLECYSTECTOMY  05/28/2013    Cholecystectomy    TOTAL KNEE ARTHROPLASTY Right     TOTAL VAGINAL HYSTERECTOMY  01/23/2017    Vaginal Hysterectomy    TUBAL LIGATION  05/28/2013    Tubal Ligation      Social history:      Social History     Socioeconomic History    Marital status:    Tobacco Use    Smoking status: Never    Smokeless tobacco: Never   Substance and Sexual Activity    Alcohol use: Never    Drug use: Never     Social Determinants of Health     Financial Resource Strain: Low Risk  (9/27/2024)    Overall Financial Resource Strain (CARDIA)     Difficulty of Paying Living Expenses: Not hard at all   Food Insecurity: Patient Declined (9/27/2024)    Hunger Vital Sign     Worried About Running Out of Food in the Last Year: Patient declined     Ran Out of Food in the Last Year: Patient declined   Transportation Needs: No Transportation Needs (9/27/2024)    PRAPARE - Transportation     Lack of Transportation (Medical): No     Lack of Transportation (Non-Medical): No   Intimate Partner Violence: Patient Declined (9/27/2024)    Humiliation, Afraid, Rape, and Kick questionnaire     Fear of Current or Ex-Partner: Patient declined     Emotionally Abused: Patient declined     Physically Abused: Patient declined     Sexually Abused: Patient declined   Housing Stability: Low Risk  (9/27/2024)    Housing Stability Vital Sign     Unable to Pay for Housing in the Last Year: No     Number of Times Moved in the Last Year: 0     Homeless in the Last Year: No        Social Determinants of Health with Concerns     Food Insecurity: Patient Declined (9/27/2024)    Hunger Vital Sign     Worried About Running Out of Food in the Last Year: Patient declined     Ran Out of Food in the Last Year: Patient declined   Physical Activity: Not on file   Stress: Not on file   Social Connections: Not on file   Intimate Partner  "Violence: Patient Declined (9/27/2024)    Humiliation, Afraid, Rape, and Kick questionnaire     Fear of Current or Ex-Partner: Patient declined     Emotionally Abused: Patient declined     Physically Abused: Patient declined     Sexually Abused: Patient declined   Utilities: Patient Declined (9/27/2024)    [facility] Utilities     Threatened with loss of utilities: Patient declined   Digital Equity: Not on file   Health Literacy: Not on file      Current medications:      Current Facility-Administered Medications:     acetaminophen (Tylenol) tablet 650 mg, 650 mg, oral, q6h PRN, Mookie Elena MD    dextrose 50 % injection 12.5 g, 12.5 g, intravenous, q15 min PRN, Gina Davila MD    dextrose 50 % injection 25 g, 25 g, intravenous, q15 min PRN, Gina Davila MD    enoxaparin (Lovenox) syringe 40 mg, 40 mg, subcutaneous, q24h, Mookie Elena MD    glucagon (Glucagen) injection 1 mg, 1 mg, intramuscular, q15 min PRN, Gina Davila MD    glucagon (Glucagen) injection 1 mg, 1 mg, intramuscular, q15 min PRN, Gina Davila MD    insulin lispro (HumaLOG) injection 0-10 Units, 0-10 Units, subcutaneous, TID, Mookie Elena MD    oxyCODONE (Roxicodone) immediate release tablet 5 mg, 5 mg, oral, q6h PRN, Mookie Elena MD    sodium chloride 0.9 % bolus 500 mL, 500 mL, intravenous, Once, Alba Restrepo DO, Last Rate: 500 mL/hr at 09/27/24 0530, Restarted at 09/27/24 0530    sodium chloride 0.9 % bolus 500 mL, 500 mL, intravenous, Once, Alba Restrepo DO    sodium chloride 0.9% infusion, 125 mL/hr, intravenous, Continuous, Alba Restrepo DO, Last Rate: 125 mL/hr at 09/27/24 0650, 125 mL/hr at 09/27/24 0650    Objective finding:    Vitals: Blood pressure (!) 168/95, pulse 110, temperature (!) 38 °C (100.4 °F), temperature source Temporal, resp. rate 16, height 1.676 m (5' 6\"), weight 99.8 kg (220 lb), SpO2 91%.   I/Os: No intake or output data in the 24 hours ending " 09/27/24 0733    Physical Examination:  GE: lying comfortable in bed, in mild painful distress  HEENT; AT/NC, EOMI, no conjunctival pallor, anicteric sclera, dry mucous membrane  Neck; supple neck, no LAD/JVD  Chest; Good air entry b/l, no adventitious sounds heard, normal rise and fall of thoracis cavity during each respiratory cycle.  CVS; s1 and s2, no MGR, RRR  Abd; soft, ND, +BS, epigastric tenderness, negative murphys, neg Rovsing, no palpable organomegaly.   Ext; no pedal edema/cyanosis/clubbing, pulses intact  Neuro; Aox3, Cn 2-12 intact, sensation intact, Motor 5/5 globally  Psych; normal mood/affect.       Laboratory finding:    Laboratory results:  Results from last 72 hours   Lab Units 09/27/24  0630 09/27/24  0238 09/26/24  1451 09/26/24  1335   SODIUM mmol/L 134* 136  --  133*   POTASSIUM mmol/L 4.2 4.2 4.4 4.3   CHLORIDE mmol/L 104 104  --  99   CO2 mmol/L 23 19*  --  23   BUN mg/dL 10 13  --  13   CREATININE mg/dL 0.70 0.84  0.84  --  0.98   GLUCOSE mg/dL 266* 326*  --  389*   CALCIUM mg/dL 8.6 9.1  --  9.4   ANION GAP mmol/L 11 17  --  15   EGFR mL/min/1.73m*2 >90 83  83  --  69      Results from last 72 hours   Lab Units 09/27/24  0358 09/26/24  1335   WBC AUTO x10*3/uL 8.4 10.7   HEMOGLOBIN g/dL 14.2 15.4   HEMATOCRIT % 41.8 45.9   MCV fL 91 91   PLATELETS AUTO x10*3/uL 194 186   NEUTROS PCT AUTO % 83.2 85.8   LYMPHS PCT AUTO % 11.3 9.3   MONOS PCT AUTO % 5.0 4.2   EOS PCT AUTO % 0.0 0.1        Other laboratory finding:   Lab Results   Component Value Date    CALCIUM 8.6 09/27/2024          Lab Results   Component Value Date    CRP 15.73 (H) 09/27/2024        Imaging:   CT abdomen pelvis w IV contrast  Narrative: Interpreted By:  Finkelstein, Evan,   STUDY:  CT ABDOMEN PELVIS W IV CONTRAST;  9/27/2024 3:21 am      INDICATION:  Signs/Symptoms:abdominal pain.          COMPARISON:  CT abdomen pelvis 06/06/2024      ACCESSION NUMBER(S):  [id number]      ORDERING CLINICIAN:  ANDREA  "CHACHO      TECHNIQUE:  Axial CT images of the abdomen and pelvis with coronal and sagittal  reconstructed images obtained after intravenous administration of 75  mL Omnipaque 350      FINDINGS:  LOWER CHEST: Mild bibasilar atelectasis.      ABDOMEN:      LIVER: The liver is diffusely decreased in attenuation.  BILE DUCTS: Normal caliber.  GALLBLADDER: Surgically absent.  PORTAL VEIN: Patent  SPLEEN: Unremarkable.  PANCREAS: Unremarkable.  ADRENALS: Unremarkable.  KIDNEYS, URETERS and URINARY BLADDER: Symmetric renal enhancement.  Likely parapelvic cysts bilaterally, similar compared to prior  imaging without definite hydroureteronephrosis.. 6.4 cm hypodensity  in the lower pole of the left kidney measures simple fluid  attenuation, most compatible with a simple cyst. Bladder is within  normal limits REPRODUCTIVE ORGANS: Surgical changes of hysterectomy.      ABDOMINAL WALL: Small fat containing umbilical hernia.  PERITONEUM: No ascites, free air or fluid collection.      BOWEL: Gastric wall thickening versus underdistention. No small or  large bowel dilatation. Diffuse colonic wall thickening with adjacent  inflammatory stranding. Nondilated appendix.      VESSELS: No aortic aneurysm.  RETROPERITONEUM: No pathologically enlarged retroperitoneal lymph  nodes.      BONES: No acute osseous abnormality.      Impression: Diffuse colonic wall thickening with adjacent inflammatory stranding  compatible with colitis.      The liver is diffusely decreased in attenuation, which may be seen  with hepatic steatosis.      MACRO:  None.      Signed by: Evan Finkelstein 9/27/2024 3:40 AM  Dictation workstation:   [id number]       Microbiology:   No results found for the last 90 days.    Lab Results   Component Value Date    BLOODCULT No growth at 4 days -  FINAL REPORT 06/07/2024                    No lab exists for component: \"AGALPCRNB\"     Assessment and Plan:    Problem list:   Principal Problem:    Abdominal " pain  Active Problems:    Acute colitis    Assessment and Plan:   Razia Gunn is a 54 y.o. female with a Medical History of T2DM, HLD, RLS,  and anxiety disorder who presented on account of abdominal pain.she has had Prior admissions for abdominal pain. Complete work up at her last presentation indicative of Rotavirus.     Active Issues:  # Enterocolitis  C diff rule out  Patient currently taking ozempic, and has had several past episodes of colitis in Feb, march and June of this year.  Imaging: Diffuse colonic wall thickening with adjacent inflammatory stranding compatible with colitis.  The liver is diffusely decreased in attenuation, which may be seen with hepatic steatosis.  Currently complained of abdominal pain   Associated history of nausea  And the history of diarrhea x 6  NPO  Stool pathogen PCR  C. Diff PCR  Fecal calprotectin  Esr, crp  S/p 1L IVF NS in the ED  continue Ondansetron   Pain management: Tylenol, oxycodone.     # Hypomagnesemia  Serum magnesium 1.25  Status post IV magnesium in the ED  Repeat RFP repeat magnesium  Replet as needed    # Hyperglycemia  # NAGMA  IVF  Insulin  Repeat bnp  Repeat VBG  If gap incr please excalate care, start insulin ggt.     Chronic Issues:  #Anxiety: buspar 15 mg, Lexapro 20mg (hold, patient is NPO)  #restless leg syndrome: ropinirole 0.5 ( hold patient is NPO)  #Hyperlipidemia: not on medications    #T2DM:  last A1c 8.7, metformin 500 mg, Ozempic 0.25 (will hold), ISS    F: N S 125 mL/h  E: Replete as needed  N: NPO  G: None  VTE: Lovenox    Code:   Full Code  Extended Emergency Contact Information  Primary Emergency Contact: Gamaliel Gunn  Home Phone: [phone]  Relation: Spouse     Disposition: Inpatient admission, will anticipate > 48hrs eLOS.     Mookie Elena MD   Internal Medicine Resident  PGY 3

## 2024-09-27 NOTE — ED PROVIDER NOTES
HPI   Chief Complaint   Patient presents with    Abdominal Pain    multiple     multiple       54-year-old female here for chief complaint of abdominal pain diarrhea.  Patient was seen here yesterday with the same complaints and was hyperglycemic.  She does not have any urinary issues.  She is nauseous but no vomiting.  No fevers or chills.  She is out of her Ozempic and is supposed to go to her doctor to get some refills pretty soon.  Pain is crampy in nature all over the abdomen nothing makes it better or worse.    Past medical history includes diabetes orthopedic fractures  Does not smoke drink or use any street drugs              Patient History   Past Medical History:   Diagnosis Date    Acute candidiasis of vulva and vagina 01/20/2015    Yeast vaginitis    Acute laryngitis 12/18/2014    Acute laryngitis    Acute maxillary sinusitis, unspecified 03/08/2020    Acute maxillary sinusitis    Candidiasis, unspecified 12/20/2017    Yeast infection    Depression     Diabetes mellitus (Multi)     Other conditions influencing health status     History of burning on urination    Other conditions influencing health status 09/30/2016    Cyst    Other conditions influencing health status 03/06/2014    History of dyspareunia    Other displaced fracture of base of first metacarpal bone, left hand, initial encounter for closed fracture 07/22/2021    Closed displaced fracture of base of first metacarpal bone of left hand, unspecified fracture morphology, initial encounter    Pelvic and perineal pain 01/23/2017    Pelvic pain in female    Personal history of other benign neoplasm 10/03/2016    History of uterine leiomyoma    Personal history of other diseases of the circulatory system 01/23/2017    History of hypertension    Personal history of other diseases of the musculoskeletal system and connective tissue 12/08/2015    History of low back pain    Personal history of other diseases of the respiratory system 02/24/2020     History of influenza    Personal history of other diseases of the respiratory system 09/26/2018    History of acute bronchitis    Personal history of other diseases of the respiratory system 02/15/2014    Personal history of acute sinusitis    Personal history of other diseases of the respiratory system 04/22/2019    History of acute bronchitis    Personal history of other diseases of the respiratory system 11/21/2017    History of acute sinusitis    Personal history of other diseases of urinary system 08/22/2017    History of cystocele    Personal history of other endocrine, nutritional and metabolic disease 10/16/2014    History of type 2 diabetes mellitus    Personal history of other mental and behavioral disorders 05/28/2013    History of depression    Personal history of other specified conditions 03/25/2014    History of chest pain    Personal history of other specified conditions 08/22/2017    History of dysuria    Personal history of other specified conditions 03/19/2015    History of diarrhea    Personal history of other specified conditions 10/10/2014    History of abdominal pain    Phlebitis and thrombophlebitis of other sites 03/20/2020    Thrombophlebitis arm    Phlebitis and thrombophlebitis of other sites 03/20/2020    Phlebitis of arm    Right lower quadrant pain 11/01/2013    Abdominal pain, RLQ (right lower quadrant)    Unspecified condition associated with female genital organs and menstrual cycle 12/21/2015    Adnexal cyst    Unspecified symptoms and signs involving the genitourinary system 10/17/2016    UTI symptoms     Past Surgical History:   Procedure Laterality Date    CHOLECYSTECTOMY  05/28/2013    Cholecystectomy    TOTAL KNEE ARTHROPLASTY Right     TOTAL VAGINAL HYSTERECTOMY  01/23/2017    Vaginal Hysterectomy    TUBAL LIGATION  05/28/2013    Tubal Ligation     No family history on file.  Social History     Tobacco Use    Smoking status: Never    Smokeless tobacco: Never   Substance Use  Topics    Alcohol use: Never    Drug use: Never       Physical Exam   ED Triage Vitals [09/27/24 0156]   Temperature Heart Rate Respirations BP   37.2 °C (99 °F) 100 18 (!) 169/101      Pulse Ox Temp Source Heart Rate Source Patient Position   95 % Temporal Monitor Sitting      BP Location FiO2 (%)     Right arm --       Physical Exam  Vitals and nursing note reviewed.   Constitutional:       Appearance: Normal appearance. She is ill-appearing.   HENT:      Head: Normocephalic and atraumatic.      Nose: Nose normal.      Mouth/Throat:      Mouth: Mucous membranes are moist.   Eyes:      Extraocular Movements: Extraocular movements intact.      Pupils: Pupils are equal, round, and reactive to light.   Cardiovascular:      Rate and Rhythm: Normal rate and regular rhythm.   Pulmonary:      Effort: Pulmonary effort is normal.      Breath sounds: Normal breath sounds.   Abdominal:      General: Abdomen is flat.      Palpations: Abdomen is soft.      Tenderness: There is generalized abdominal tenderness.   Musculoskeletal:         General: Normal range of motion.      Cervical back: Normal range of motion.   Skin:     General: Skin is warm and dry.      Capillary Refill: Capillary refill takes less than 2 seconds.   Neurological:      General: No focal deficit present.      Mental Status: She is alert.   Psychiatric:         Mood and Affect: Mood normal.           ED Course & MDM   Diagnoses as of 09/28/24 0435   Acute colitis                 No data recorded     [place] Coma Scale Score: 15 (09/27/24 2116 : Sheyla Flores, ANGELINA)                           Medical Decision Making  Patient was worked up and lab work is showing a glucose of 326 magnesium along tape 1.25.  This was replaced IV.  CT abdomen pelvis shows diffuse colitis.  Patient was given 500 cc of saline Zofran and Dilaudid with minimal relief.  Second Zofran and Dilaudid were given.  Compazine then given for persistent nausea.  She will be hospitalized for  further management and treatment.      Differential includes small bowel obstruction colitis viral syndrome  Considered ultrasound abdomen        Procedure  Procedures     Alba Restrepo DO  09/28/24 0764

## 2024-09-27 NOTE — PROGRESS NOTES
09/27/24 1042   Discharge Planning   Living Arrangements Spouse/significant other;Children   Support Systems Spouse/significant other;Children;Friends/neighbors   Assistance Needed Home with spouse and 2 adult children. A&0x3, Independent with ADLs, no DME, doesn't drive ( jacy), room air, no cpap or bipap, not active with any HHC, PCP jacqueline Mena   Type of Residence Private residence   Number of Stairs to Enter Residence 3   Number of Stairs Within Residence 14   Do you have animals or pets at home? Yes   Type of Animals or Pets dog   Who is requesting discharge planning? Provider   Home or Post Acute Services None   Expected Discharge Disposition Home  (denies any HHC needs)   Does the patient need discharge transport arranged? No   Financial Resource Strain   How hard is it for you to pay for the very basics like food, housing, medical care, and heating? Not hard   Housing Stability   In the last 12 months, was there a time when you were not able to pay the mortgage or rent on time? N   In the past 12 months, how many times have you moved where you were living? 0   At any time in the past 12 months, were you homeless or living in a shelter (including now)? N   Transportation Needs   In the past 12 months, has lack of transportation kept you from medical appointments or from getting medications? no   In the past 12 months, has lack of transportation kept you from meetings, work, or from getting things needed for daily living? No

## 2024-09-27 NOTE — ED TRIAGE NOTES
Pt arrived by EMS with c/o lower abdominal pain with nausea and hyperglycemia. Pt was here 7 hours ago and was given insulin and IV fluids and was released home. Pt ate dinner and started not to feel well, checked her blood sugar and said it was in the 400s. Pt took her Ozempic and stated her sugar still didn't come down. Pt was given 50 mcg of fentanyl and 4 mg of Zofran by EMS. Pt states 9/10 pain. On arrival to ED pts glucose is 295.

## 2024-09-28 LAB
ALBUMIN SERPL BCP-MCNC: 3.2 G/DL (ref 3.4–5)
ALP SERPL-CCNC: 97 U/L (ref 33–110)
ALT SERPL W P-5'-P-CCNC: 109 U/L (ref 7–45)
ANION GAP SERPL CALC-SCNC: 12 MMOL/L (ref 10–20)
AST SERPL W P-5'-P-CCNC: 69 U/L (ref 9–39)
BASE EXCESS BLDV CALC-SCNC: -3.1 MMOL/L (ref -2–3)
BILIRUB SERPL-MCNC: 0.4 MG/DL (ref 0–1.2)
BODY TEMPERATURE: ABNORMAL
BUN SERPL-MCNC: 8 MG/DL (ref 6–23)
C COLI+JEJ+UPSA DNA STL QL NAA+PROBE: DETECTED
C DIF TOX TCDA+TCDB STL QL NAA+PROBE: NOT DETECTED
CALCIUM SERPL-MCNC: 8.2 MG/DL (ref 8.6–10.3)
CHLORIDE SERPL-SCNC: 107 MMOL/L (ref 98–107)
CO2 SERPL-SCNC: 22 MMOL/L (ref 21–32)
CREAT SERPL-MCNC: 0.68 MG/DL (ref 0.5–1.05)
EC STX1 GENE STL QL NAA+PROBE: NOT DETECTED
EC STX2 GENE STL QL NAA+PROBE: NOT DETECTED
EGFRCR SERPLBLD CKD-EPI 2021: >90 ML/MIN/1.73M*2
ERYTHROCYTE [DISTWIDTH] IN BLOOD BY AUTOMATED COUNT: 12 % (ref 11.5–14.5)
GLUCOSE BLD MANUAL STRIP-MCNC: 174 MG/DL (ref 74–99)
GLUCOSE BLD MANUAL STRIP-MCNC: 185 MG/DL (ref 74–99)
GLUCOSE BLD MANUAL STRIP-MCNC: 187 MG/DL (ref 74–99)
GLUCOSE BLD MANUAL STRIP-MCNC: 219 MG/DL (ref 74–99)
GLUCOSE SERPL-MCNC: 199 MG/DL (ref 74–99)
HCO3 BLDV-SCNC: 22 MMOL/L (ref 22–26)
HCT VFR BLD AUTO: 38.8 % (ref 36–46)
HCT VFR BLD AUTO: 39.3 % (ref 36–46)
HGB BLD-MCNC: 12.8 G/DL (ref 12–16)
HGB BLD-MCNC: 12.9 G/DL (ref 12–16)
INHALED O2 CONCENTRATION: 21 %
MAGNESIUM SERPL-MCNC: 1.61 MG/DL (ref 1.6–2.4)
MCH RBC QN AUTO: 30.7 PG (ref 26–34)
MCHC RBC AUTO-ENTMCNC: 32.6 G/DL (ref 32–36)
MCV RBC AUTO: 94 FL (ref 80–100)
NOROVIRUS GI + GII RNA STL NAA+PROBE: NOT DETECTED
NRBC BLD-RTO: 0 /100 WBCS (ref 0–0)
OXYHGB MFR BLDV: 92.8 % (ref 45–75)
PCO2 BLDV: 39 MM HG (ref 41–51)
PH BLDV: 7.36 PH (ref 7.33–7.43)
PHOSPHATE SERPL-MCNC: 2.9 MG/DL (ref 2.5–4.9)
PLATELET # BLD AUTO: 166 X10*3/UL (ref 150–450)
PO2 BLDV: 69 MM HG (ref 35–45)
POTASSIUM SERPL-SCNC: 3.6 MMOL/L (ref 3.5–5.3)
PROT SERPL-MCNC: 5.5 G/DL (ref 6.4–8.2)
RBC # BLD AUTO: 4.17 X10*6/UL (ref 4–5.2)
RV RNA STL NAA+PROBE: NOT DETECTED
SALMONELLA DNA STL QL NAA+PROBE: NOT DETECTED
SAO2 % BLDV: 96 % (ref 45–75)
SHIGELLA DNA SPEC QL NAA+PROBE: NOT DETECTED
SODIUM SERPL-SCNC: 137 MMOL/L (ref 136–145)
V CHOLERAE DNA STL QL NAA+PROBE: NOT DETECTED
WBC # BLD AUTO: 4.8 X10*3/UL (ref 4.4–11.3)
Y ENTEROCOL DNA STL QL NAA+PROBE: NOT DETECTED

## 2024-09-28 PROCEDURE — 84100 ASSAY OF PHOSPHORUS: CPT

## 2024-09-28 PROCEDURE — 80053 COMPREHEN METABOLIC PANEL: CPT

## 2024-09-28 PROCEDURE — 2500000001 HC RX 250 WO HCPCS SELF ADMINISTERED DRUGS (ALT 637 FOR MEDICARE OP)

## 2024-09-28 PROCEDURE — 83735 ASSAY OF MAGNESIUM: CPT

## 2024-09-28 PROCEDURE — 82805 BLOOD GASES W/O2 SATURATION: CPT

## 2024-09-28 PROCEDURE — 82947 ASSAY GLUCOSE BLOOD QUANT: CPT

## 2024-09-28 PROCEDURE — 99232 SBSQ HOSP IP/OBS MODERATE 35: CPT

## 2024-09-28 PROCEDURE — 85027 COMPLETE CBC AUTOMATED: CPT

## 2024-09-28 PROCEDURE — 2500000002 HC RX 250 W HCPCS SELF ADMINISTERED DRUGS (ALT 637 FOR MEDICARE OP, ALT 636 FOR OP/ED)

## 2024-09-28 PROCEDURE — 2500000004 HC RX 250 GENERAL PHARMACY W/ HCPCS (ALT 636 FOR OP/ED)

## 2024-09-28 PROCEDURE — 36415 COLL VENOUS BLD VENIPUNCTURE: CPT

## 2024-09-28 PROCEDURE — 1100000001 HC PRIVATE ROOM DAILY

## 2024-09-28 PROCEDURE — 85018 HEMOGLOBIN: CPT

## 2024-09-28 RX ORDER — AZITHROMYCIN 500 MG/1
500 TABLET, FILM COATED ORAL
Status: DISCONTINUED | OUTPATIENT
Start: 2024-09-28 | End: 2024-09-29 | Stop reason: HOSPADM

## 2024-09-28 RX ADMIN — SODIUM CHLORIDE 125 ML/HR: 9 INJECTION, SOLUTION INTRAVENOUS at 01:08

## 2024-09-28 RX ADMIN — ESCITALOPRAM OXALATE 20 MG: 20 TABLET ORAL at 09:18

## 2024-09-28 RX ADMIN — HYDROXYZINE PAMOATE 50 MG: 25 CAPSULE ORAL at 21:11

## 2024-09-28 RX ADMIN — BUSPIRONE HYDROCHLORIDE 15 MG: 15 TABLET ORAL at 15:20

## 2024-09-28 RX ADMIN — OXYCODONE HYDROCHLORIDE 5 MG: 5 TABLET ORAL at 10:03

## 2024-09-28 RX ADMIN — INSULIN LISPRO 6 UNITS: 100 INJECTION, SOLUTION INTRAVENOUS; SUBCUTANEOUS at 21:02

## 2024-09-28 RX ADMIN — BUSPIRONE HYDROCHLORIDE 15 MG: 15 TABLET ORAL at 09:18

## 2024-09-28 RX ADMIN — AZITHROMYCIN DIHYDRATE 500 MG: 500 TABLET ORAL at 15:20

## 2024-09-28 RX ADMIN — BUSPIRONE HYDROCHLORIDE 15 MG: 15 TABLET ORAL at 21:02

## 2024-09-28 RX ADMIN — INSULIN LISPRO 3 UNITS: 100 INJECTION, SOLUTION INTRAVENOUS; SUBCUTANEOUS at 16:40

## 2024-09-28 RX ADMIN — ROPINIROLE HYDROCHLORIDE 0.5 MG: 0.25 TABLET, FILM COATED ORAL at 21:02

## 2024-09-28 RX ADMIN — INSULIN LISPRO 3 UNITS: 100 INJECTION, SOLUTION INTRAVENOUS; SUBCUTANEOUS at 11:56

## 2024-09-28 RX ADMIN — INSULIN LISPRO 3 UNITS: 100 INJECTION, SOLUTION INTRAVENOUS; SUBCUTANEOUS at 09:18

## 2024-09-28 RX ADMIN — ACETAMINOPHEN 650 MG: 325 TABLET ORAL at 15:20

## 2024-09-28 RX ADMIN — ENOXAPARIN SODIUM 40 MG: 40 INJECTION SUBCUTANEOUS at 09:18

## 2024-09-28 ASSESSMENT — COGNITIVE AND FUNCTIONAL STATUS - GENERAL
MOBILITY SCORE: 24
TOILETING: A LITTLE
DAILY ACTIVITIY SCORE: 23
MOBILITY SCORE: 24
DAILY ACTIVITIY SCORE: 23
TOILETING: A LITTLE

## 2024-09-28 ASSESSMENT — PAIN SCALES - GENERAL
PAINLEVEL_OUTOF10: 9
PAINLEVEL_OUTOF10: 3

## 2024-09-28 ASSESSMENT — PAIN DESCRIPTION - DESCRIPTORS: DESCRIPTORS: ACHING

## 2024-09-28 ASSESSMENT — PAIN - FUNCTIONAL ASSESSMENT: PAIN_FUNCTIONAL_ASSESSMENT: 0-10

## 2024-09-28 NOTE — CARE PLAN
The patient's goals for the shift include      The clinical goals for the shift include Maintain patient pain

## 2024-09-28 NOTE — HOSPITAL COURSE
Razia Gunn is a 54 year old female with a PMH of T2DM, HLD, RLS, and anxiety disorder who presented to the ED initially with abdominal pain, nausea, diarrhea, and hyperglycemia. She firstly came to the ED with these symptoms, was given IVF and insulin, then discharged home. She then noticed that her sugars were in the 400s at home, and her symptoms did not resolve, so she returned back to the ER. CT abdomen and pelvis showed colitis. Upon floor arrival, patient's blood sugars were significantly improved after sliding scale insulin. She was started on maintenance IV fluids and symptomatic care for her nausea. Stool studies were positive for campylobacter, so was started on a 3 day course of azithromycin.

## 2024-09-28 NOTE — PROGRESS NOTES
Razia Gunn is a 54 y.o. female on day 1 of admission presenting with Abdominal pain.      Subjective   Overnight, patient had a large bloody bowel movement, however Hgb remained stable. Patient has not had a bowel movement since. Patient states that every time she eats, she continues to have diarrhea right after.    Objective     Last Recorded Vitals  /74 (BP Location: Left arm, Patient Position: Lying)   Pulse 88   Temp 35.9 °C (96.6 °F) (Temporal)   Resp 17   Wt 99.8 kg (220 lb)   SpO2 91%   Intake/Output last 3 Shifts:    Intake/Output Summary (Last 24 hours) at 9/28/2024 1244  Last data filed at 9/28/2024 0451  Gross per 24 hour   Intake 5467.92 ml   Output --   Net 5467.92 ml       Admission Weight  Weight: 99.8 kg (220 lb) (09/27/24 0156)    Daily Weight  09/27/24 : 99.8 kg (220 lb)    Image Results  CT abdomen pelvis w IV contrast  Narrative: Interpreted By:  Finkelstein, Evan,   STUDY:  CT ABDOMEN PELVIS W IV CONTRAST;  9/27/2024 3:21 am      INDICATION:  Signs/Symptoms:abdominal pain.          COMPARISON:  CT abdomen pelvis 06/06/2024      ACCESSION NUMBER(S):  [id number]      ORDERING CLINICIAN:  ANDREA FLOR      TECHNIQUE:  Axial CT images of the abdomen and pelvis with coronal and sagittal  reconstructed images obtained after intravenous administration of 75  mL Omnipaque 350      FINDINGS:  LOWER CHEST: Mild bibasilar atelectasis.      ABDOMEN:      LIVER: The liver is diffusely decreased in attenuation.  BILE DUCTS: Normal caliber.  GALLBLADDER: Surgically absent.  PORTAL VEIN: Patent  SPLEEN: Unremarkable.  PANCREAS: Unremarkable.  ADRENALS: Unremarkable.  KIDNEYS, URETERS and URINARY BLADDER: Symmetric renal enhancement.  Likely parapelvic cysts bilaterally, similar compared to prior  imaging without definite hydroureteronephrosis.. 6.4 cm hypodensity  in the lower pole of the left kidney measures simple fluid  attenuation, most compatible with a simple cyst. Bladder  is within  normal limits REPRODUCTIVE ORGANS: Surgical changes of hysterectomy.      ABDOMINAL WALL: Small fat containing umbilical hernia.  PERITONEUM: No ascites, free air or fluid collection.      BOWEL: Gastric wall thickening versus underdistention. No small or  large bowel dilatation. Diffuse colonic wall thickening with adjacent  inflammatory stranding. Nondilated appendix.      VESSELS: No aortic aneurysm.  RETROPERITONEUM: No pathologically enlarged retroperitoneal lymph  nodes.      BONES: No acute osseous abnormality.      Impression: Diffuse colonic wall thickening with adjacent inflammatory stranding  compatible with colitis.      The liver is diffusely decreased in attenuation, which may be seen  with hepatic steatosis.      MACRO:  None.      Signed by: Evan Finkelstein 9/27/2024 3:40 AM  Dictation workstation:   [id number]      Physical Exam  Vitals reviewed.   Constitutional:       General: She is not in acute distress.     Appearance: She is not ill-appearing.   Cardiovascular:      Rate and Rhythm: Normal rate and regular rhythm.      Heart sounds: Normal heart sounds. No murmur heard.  Pulmonary:      Effort: No respiratory distress.      Breath sounds: Normal breath sounds. No wheezing.   Abdominal:      General: There is no distension.      Palpations: Abdomen is soft.      Tenderness: There is no abdominal tenderness.   Musculoskeletal:         General: No tenderness.      Right lower leg: Edema present.      Left lower leg: Edema present.   Neurological:      General: No focal deficit present.      Mental Status: She is alert and oriented to person, place, and time. Mental status is at baseline.         Relevant Results  Scheduled medications  busPIRone, 15 mg, oral, TID  enoxaparin, 40 mg, subcutaneous, q24h  escitalopram, 20 mg, oral, Daily  insulin lispro, 0-15 Units, subcutaneous, With meals & nightly  rOPINIRole, 0.5 mg, oral, Nightly      Continuous medications  sodium chloride 0.9%,  125 mL/hr, Last Rate: 125 mL/hr (09/28/24 0454)      PRN medications  PRN medications: acetaminophen, dextrose, dextrose, glucagon, glucagon, hydrOXYzine pamoate, ondansetron, oxyCODONE    Results for orders placed or performed during the hospital encounter of 09/27/24 (from the past 24 hour(s))   POCT GLUCOSE   Result Value Ref Range    POCT Glucose 169 (H) 74 - 99 mg/dL   Sedimentation rate, automated   Result Value Ref Range    Sedimentation Rate 10 0 - 30 mm/h   POCT GLUCOSE   Result Value Ref Range    POCT Glucose 190 (H) 74 - 99 mg/dL   Hemoglobin and hematocrit, blood   Result Value Ref Range    Hemoglobin 12.9 12.0 - 16.0 g/dL    Hematocrit 38.8 36.0 - 46.0 %   Comprehensive metabolic panel   Result Value Ref Range    Glucose 199 (H) 74 - 99 mg/dL    Sodium 137 136 - 145 mmol/L    Potassium 3.6 3.5 - 5.3 mmol/L    Chloride 107 98 - 107 mmol/L    Bicarbonate 22 21 - 32 mmol/L    Anion Gap 12 10 - 20 mmol/L    Urea Nitrogen 8 6 - 23 mg/dL    Creatinine 0.68 0.50 - 1.05 mg/dL    eGFR >90 >60 mL/min/1.73m*2    Calcium 8.2 (L) 8.6 - 10.3 mg/dL    Albumin 3.2 (L) 3.4 - 5.0 g/dL    Alkaline Phosphatase 97 33 - 110 U/L    Total Protein 5.5 (L) 6.4 - 8.2 g/dL    AST 69 (H) 9 - 39 U/L    Bilirubin, Total 0.4 0.0 - 1.2 mg/dL     (H) 7 - 45 U/L   CBC   Result Value Ref Range    WBC 4.8 4.4 - 11.3 x10*3/uL    nRBC 0.0 0.0 - 0.0 /100 WBCs    RBC 4.17 4.00 - 5.20 x10*6/uL    Hemoglobin 12.8 12.0 - 16.0 g/dL    Hematocrit 39.3 36.0 - 46.0 %    MCV 94 80 - 100 fL    MCH 30.7 26.0 - 34.0 pg    MCHC 32.6 32.0 - 36.0 g/dL    RDW 12.0 11.5 - 14.5 %    Platelets 166 150 - 450 x10*3/uL   Magnesium   Result Value Ref Range    Magnesium 1.61 1.60 - 2.40 mg/dL   Phosphorus   Result Value Ref Range    Phosphorus 2.9 2.5 - 4.9 mg/dL   POCT GLUCOSE   Result Value Ref Range    POCT Glucose 174 (H) 74 - 99 mg/dL   Blood Gas Venous   Result Value Ref Range    POCT pH, Venous 7.36 7.33 - 7.43 pH    POCT pCO2, Venous 39 (L) 41 - 51 mm  Hg    POCT pO2, Venous 69 (H) 35 - 45 mm Hg    POCT SO2, Venous 96 (H) 45 - 75 %    POCT Oxy Hemoglobin, Venous 92.8 (H) 45.0 - 75.0 %    POCT Base Excess, Venous -3.1 (L) -2.0 - 3.0 mmol/L    POCT HCO3 Calculated, Venous 22.0 22.0 - 26.0 mmol/L    Patient Temperature      FiO2 21 %   POCT GLUCOSE   Result Value Ref Range    POCT Glucose 187 (H) 74 - 99 mg/dL       Assessment/Plan   Razia Gunn is a 54 y.o. female with a Medical History of T2DM, HLD, RLS,  and anxiety disorder who presented on account of abdominal pain and is admitted for hyperglycemia and enterocolitis vs viral gastroenteritis.     Acute Medical Issues  #Enterocolitis vs viral gastroenteritis  #C diff rule out  - Diet as tolerated per patient  - C diff negative, pending stool pathogen PCR. If stool pathogen PCR negative, will start Bentyl and loperamide  - Pending fecal calprotectin  - Continuing maintenance IVF   - Zofran PRN for nausea control     #Hyperglycemia (resolved)  #History of T2DM  - SSI in place  - Patient's blood sugars are well controlled currently and at goal, continue to monitor  - Continue to hold home metformin and Ozempic     #Hypomagnesemia (resolved)  - Mag 1.61 and stable  - Continue to monitor and replete as needed     Chronic Medical Issues  #Anxiety: buspar 15 mg, Lexapro 20mg, Vistaril 50 mg q6h PRN for anxiety  #restless leg syndrome: ropinirole 0.5  #Hyperlipidemia: not on medications       F:  mL/h  E: Replete as needed  N: Diabetic diet  G: None  VTE: Lovenox     Code:   Full Code  Extended Emergency Contact Information  Primary Emergency Contact: Gamaliel Gunn  Home Phone: [phone]  Relation: Spouse      Dispo: Pending clinical improvement.    Bri Engel MD

## 2024-09-28 NOTE — CARE PLAN
Problem: Fall/Injury  Goal: Be free from injury by end of the shift  Outcome: Progressing     Problem: Fall/Injury  Goal: Verbalize understanding of personal risk factors for fall in the hospital  Outcome: Progressing     Problem: Skin  Goal: Promote/optimize nutrition  Outcome: Progressing   The patient's goals for the shift include decrease in pain and improved comfortability    The clinical goals for the shift include Patient will have decreased pain by the end of shift

## 2024-09-29 VITALS
TEMPERATURE: 97.9 F | HEART RATE: 79 BPM | SYSTOLIC BLOOD PRESSURE: 114 MMHG | WEIGHT: 220 LBS | BODY MASS INDEX: 35.36 KG/M2 | OXYGEN SATURATION: 95 % | HEIGHT: 66 IN | RESPIRATION RATE: 16 BRPM | DIASTOLIC BLOOD PRESSURE: 74 MMHG

## 2024-09-29 PROBLEM — R10.9 ABDOMINAL PAIN: Status: RESOLVED | Noted: 2024-09-27 | Resolved: 2024-09-29

## 2024-09-29 LAB
ALBUMIN SERPL BCP-MCNC: 3 G/DL (ref 3.4–5)
ANION GAP SERPL CALC-SCNC: 12 MMOL/L (ref 10–20)
BUN SERPL-MCNC: 9 MG/DL (ref 6–23)
CALCIUM SERPL-MCNC: 8.1 MG/DL (ref 8.6–10.3)
CHLORIDE SERPL-SCNC: 107 MMOL/L (ref 98–107)
CO2 SERPL-SCNC: 22 MMOL/L (ref 21–32)
CREAT SERPL-MCNC: 0.64 MG/DL (ref 0.5–1.05)
EGFRCR SERPLBLD CKD-EPI 2021: >90 ML/MIN/1.73M*2
ERYTHROCYTE [DISTWIDTH] IN BLOOD BY AUTOMATED COUNT: 12.1 % (ref 11.5–14.5)
GLUCOSE BLD MANUAL STRIP-MCNC: 212 MG/DL (ref 74–99)
GLUCOSE BLD MANUAL STRIP-MCNC: 218 MG/DL (ref 74–99)
GLUCOSE SERPL-MCNC: 232 MG/DL (ref 74–99)
HCT VFR BLD AUTO: 38.8 % (ref 36–46)
HGB BLD-MCNC: 12.9 G/DL (ref 12–16)
MAGNESIUM SERPL-MCNC: 1.47 MG/DL (ref 1.6–2.4)
MCH RBC QN AUTO: 30.6 PG (ref 26–34)
MCHC RBC AUTO-ENTMCNC: 33.2 G/DL (ref 32–36)
MCV RBC AUTO: 92 FL (ref 80–100)
NRBC BLD-RTO: 0 /100 WBCS (ref 0–0)
PHOSPHATE SERPL-MCNC: 2.9 MG/DL (ref 2.5–4.9)
PHOSPHATE SERPL-MCNC: 3.1 MG/DL (ref 2.5–4.9)
PLATELET # BLD AUTO: 195 X10*3/UL (ref 150–450)
POTASSIUM SERPL-SCNC: 3.6 MMOL/L (ref 3.5–5.3)
RBC # BLD AUTO: 4.21 X10*6/UL (ref 4–5.2)
SODIUM SERPL-SCNC: 137 MMOL/L (ref 136–145)
WBC # BLD AUTO: 3.3 X10*3/UL (ref 4.4–11.3)

## 2024-09-29 PROCEDURE — 2500000001 HC RX 250 WO HCPCS SELF ADMINISTERED DRUGS (ALT 637 FOR MEDICARE OP)

## 2024-09-29 PROCEDURE — 2500000002 HC RX 250 W HCPCS SELF ADMINISTERED DRUGS (ALT 637 FOR MEDICARE OP, ALT 636 FOR OP/ED)

## 2024-09-29 PROCEDURE — 36415 COLL VENOUS BLD VENIPUNCTURE: CPT

## 2024-09-29 PROCEDURE — 82947 ASSAY GLUCOSE BLOOD QUANT: CPT

## 2024-09-29 PROCEDURE — 2500000004 HC RX 250 GENERAL PHARMACY W/ HCPCS (ALT 636 FOR OP/ED)

## 2024-09-29 PROCEDURE — 85027 COMPLETE CBC AUTOMATED: CPT

## 2024-09-29 PROCEDURE — 84100 ASSAY OF PHOSPHORUS: CPT

## 2024-09-29 PROCEDURE — 83735 ASSAY OF MAGNESIUM: CPT

## 2024-09-29 PROCEDURE — 99238 HOSP IP/OBS DSCHRG MGMT 30/<: CPT

## 2024-09-29 PROCEDURE — 80069 RENAL FUNCTION PANEL: CPT

## 2024-09-29 RX ORDER — PANTOPRAZOLE SODIUM 40 MG/1
40 TABLET, DELAYED RELEASE ORAL ONCE
Status: DISCONTINUED | OUTPATIENT
Start: 2024-09-29 | End: 2024-09-29 | Stop reason: SDUPTHER

## 2024-09-29 RX ORDER — MAGNESIUM SULFATE HEPTAHYDRATE 40 MG/ML
2 INJECTION, SOLUTION INTRAVENOUS ONCE
Status: COMPLETED | OUTPATIENT
Start: 2024-09-29 | End: 2024-09-29

## 2024-09-29 RX ORDER — AZITHROMYCIN 500 MG/1
500 TABLET, FILM COATED ORAL
Qty: 1 TABLET | Refills: 0 | Status: SHIPPED | OUTPATIENT
Start: 2024-09-30 | End: 2024-10-01

## 2024-09-29 RX ORDER — PANTOPRAZOLE SODIUM 40 MG/1
40 TABLET, DELAYED RELEASE ORAL
Status: DISCONTINUED | OUTPATIENT
Start: 2024-09-30 | End: 2024-09-29

## 2024-09-29 RX ORDER — PANTOPRAZOLE SODIUM 40 MG/1
40 TABLET, DELAYED RELEASE ORAL
Status: DISCONTINUED | OUTPATIENT
Start: 2024-09-29 | End: 2024-09-29 | Stop reason: HOSPADM

## 2024-09-29 RX ADMIN — INSULIN LISPRO 6 UNITS: 100 INJECTION, SOLUTION INTRAVENOUS; SUBCUTANEOUS at 12:02

## 2024-09-29 RX ADMIN — INSULIN LISPRO 6 UNITS: 100 INJECTION, SOLUTION INTRAVENOUS; SUBCUTANEOUS at 08:38

## 2024-09-29 RX ADMIN — PANTOPRAZOLE SODIUM 40 MG: 40 TABLET, DELAYED RELEASE ORAL at 10:11

## 2024-09-29 RX ADMIN — ESCITALOPRAM OXALATE 20 MG: 20 TABLET ORAL at 08:39

## 2024-09-29 RX ADMIN — BUSPIRONE HYDROCHLORIDE 15 MG: 15 TABLET ORAL at 08:39

## 2024-09-29 RX ADMIN — MAGNESIUM SULFATE HEPTAHYDRATE 2 G: 2 INJECTION, SOLUTION INTRAVENOUS at 10:03

## 2024-09-29 RX ADMIN — BUSPIRONE HYDROCHLORIDE 15 MG: 15 TABLET ORAL at 12:06

## 2024-09-29 RX ADMIN — SODIUM CHLORIDE 125 ML/HR: 9 INJECTION, SOLUTION INTRAVENOUS at 00:45

## 2024-09-29 RX ADMIN — ENOXAPARIN SODIUM 40 MG: 40 INJECTION SUBCUTANEOUS at 08:39

## 2024-09-29 RX ADMIN — AZITHROMYCIN DIHYDRATE 500 MG: 500 TABLET ORAL at 08:39

## 2024-09-29 ASSESSMENT — COGNITIVE AND FUNCTIONAL STATUS - GENERAL
DAILY ACTIVITIY SCORE: 24
MOBILITY SCORE: 24

## 2024-09-29 ASSESSMENT — PAIN SCALES - GENERAL: PAINLEVEL_OUTOF10: 0 - NO PAIN

## 2024-09-29 NOTE — DISCHARGE SUMMARY
Discharge Diagnosis  # Campylobacter enterocolitis     Issues Requiring Follow-Up  - Continue taking azithromycin for 1 additional day that is 09/30/2024  - Follow-up with your PCP in 2 weeks after discharge    Discharge Meds     Medication List      START taking these medications     azithromycin 500 mg tablet; Commonly known as: Zithromax; Take 1 tablet   (500 mg) by mouth once every 24 hours for 1 dose.; Start taking on:   September 30, 2024     CONTINUE taking these medications     acetaminophen 325 mg tablet; Commonly known as: Tylenol   busPIRone 15 mg tablet; Commonly known as: Buspar   CHOLECALCIFEROL (VITAMIN D3) ORAL   CRANBERRY ORAL   CYANOCOBALAMIN (VITAMIN B-12) ORAL   escitalopram 20 mg tablet; Commonly known as: Lexapro   hydrOXYzine pamoate 50 mg capsule; Commonly known as: Vistaril   metFORMIN 500 mg tablet; Commonly known as: Glucophage; TAKE 1 TABLET BY   MOUTH WITH WITH LUNCH AND SUPPER   Ozempic 1 mg/dose (2 mg/1.5 mL) pen injector; Generic drug: semaglutide;   Inject 1 mg under the skin 1 (one) time per week.   rOPINIRole 0.5 mg tablet; Commonly known as: Requip; Take 1 tablet (0.5   mg) by mouth once daily at bedtime.     STOP taking these medications     naproxen 500 mg tablet; Commonly known as: Naprosyn   phenazopyridine 100 mg tablet; Commonly known as: Pyridium     ASK your doctor about these medications     dicyclomine 20 mg tablet; Commonly known as: Bentyl; Take 0.5 tablets   (10 mg) by mouth 4 times a day as needed (abdominal pain).       Test Results Pending At Discharge  Pending Labs       Order Current Status    Calprotectin Stool In process          Hospital Course  Razia Gunn is a 54 year old female with a PMH of T2DM, HLD, RLS, and anxiety disorder who presented to the ED initially with abdominal pain, nausea, diarrhea, and hyperglycemia. She firstly came to the ED with these symptoms, was given IVF and insulin, then discharged home. She then noticed that her sugars were  in the 400s at home, and her symptoms did not resolve, so she returned back to the ER. CT abdomen and pelvis showed colitis. Upon floor arrival, patient's blood sugars were significantly improved after sliding scale insulin. She was started on maintenance IV fluids and symptomatic care for her nausea. Stool studies were positive for campylobacter, so was started on a 3 day course of azithromycin.  She was discharged on 09/29/2024 with recommendation to follow-up with her PCP.    Pertinent Physical Exam At Time of Discharge  Physical Exam  Constitutional: Pleasant, Awake/Alert/Oriented to person place and time. No distress  Head: Atraumatic, Normocephalic  Eyes: EOMI. RAO  Neck: Enlarged neck circumference, No JVD  Cardiovascular: Regular rate and rhythm, S1, S2. No extra heart sounds or murmurs  Respiratory: Clear to auscultation bilaterally. No wheezing, rales or rhonchi. Good chest wall expansion  Abdomen: Soft, Nontender, Obese. Bowel sounds appreciated  Musculoskeletal: ROM intact. Muscle strength grossly intact upper and lower extremities 5/5.   Neurological: CNII-XII intact. Sensation grossly intact  Extremities: Warm and dry. No acute rashes and lesions  Psychiatric: Appropriate mood and affect     Outpatient Follow-Up  No future appointments.    Sandro Villar MD

## 2024-09-29 NOTE — CARE PLAN
The patient's goals for the shift include  pt states she would like to get some rest    The clinical goals for the shift include maintain safety and remain free from falls by end of shift

## 2024-09-29 NOTE — DISCHARGE INSTRUCTIONS
Your diarrhea due to enterocolitis from Campylobacter jejunitis. You are now stable enough to be discharged home.    The following recommendations are made for you at time of hospital discharge:  - Please take your home medications as instructed prior to hospitalization.   - Please ensure adequate hydration as long as you continue to have loose bowel movements  - Your diarrhea will subsequently resolve, please do not take loperamide for your loose bowel movements  - The following changes to your home medications have been made during your hospital stay:   => Please take 1 additional day of azithromycin 500 mg on 09/30/2024  - Please follow-up with your primary care provider within 5-7 days from time of discharge. Likely will need to bring photo ID and insurance card to your appointment.   -  services has been requested to make an appointment for you however if you do not hear back from them within 2-3 days, please call [phone] to find out about appointments with  services.  - If you experience any worsening symptoms or any new acute concerns arise, please contact your primary care provider to discuss and possibly arrange an appointment. If you cannot get in touch with provider or severe symptoms are present, please return to nearest emergency room/urgent care for evaluation and treatment.

## 2024-10-01 ENCOUNTER — PATIENT OUTREACH (OUTPATIENT)
Dept: PRIMARY CARE | Facility: CLINIC | Age: 54
End: 2024-10-01
Payer: COMMERCIAL

## 2024-10-01 DIAGNOSIS — K52.9 COLITIS: ICD-10-CM

## 2024-10-01 DIAGNOSIS — Z09 HOSPITAL DISCHARGE FOLLOW-UP: ICD-10-CM

## 2024-10-01 LAB — CALPROTECTIN STL-MCNT: 384 UG/G

## 2024-10-01 NOTE — PROGRESS NOTES
TCM outreach completed: 10/1/24     2 attempts were made to reach patient to assess needs. No return call as of this note.   If patient schedules follow up within 14 days of discharge, visit is TCM billable. Needs seen by: 10/14/24.   Message sent to PRACTICE STAFF to reach out to patient and schedule an appointment within 14 days from discharge date.    Moderately complex & follow-up within 14 days- bill [id number] for hospital follow up.   Highly complex and follow-up visit within 7 days-can bill [id number] for hospital follow up    *virtual follow up needs modifier added (95 or GT)   *AWV AND TCM CAN BE BILLED TOGETHER WITH 25 MODIFIER       Discharge Facility: [facility]  Discharge Diagnosis: Acute colitis, Campylobacter enterocolitis   Admission Date: 9/27/24  Discharge Date: 9/29/24            --Hospital Encounter and Summary Linked: Yes--      Issues Requiring Follow-Up:  - Continue taking azithromycin for 1 additional day that is 09/30/2024  - Follow-up with your PCP in 2 weeks after discharge  START taking these medications:     azithromycin 500 mg tablet; Commonly known as: Zithromax; Take 1 tablet   (500 mg) by mouth once every 24 hours for 1 dose.; Start taking on:   September 30, 2024  STOP taking these medications:   naproxen 500 mg tablet; Commonly known as: Naprosyn   phenazopyridine 100 mg tablet; Commonly known as: Pyridium

## 2024-10-02 NOTE — SIGNIFICANT EVENT
Follow Up Phone Call    Outgoing phone call    Spoke to: Razia Gunn Relationship:daughter   Phone number: [phone]      Outcome:    Chief Complaint   Patient presents with    Abdominal Pain    multiple     multiple          Diagnosis:Not applicable    States she is feeling better. No further questions or concerns.

## 2024-10-15 ENCOUNTER — PATIENT OUTREACH (OUTPATIENT)
Dept: PRIMARY CARE | Facility: CLINIC | Age: 54
End: 2024-10-15
Payer: COMMERCIAL

## 2024-10-15 DIAGNOSIS — Z09 HOSPITAL DISCHARGE FOLLOW-UP: ICD-10-CM

## 2024-10-15 NOTE — PROGRESS NOTES
Unable to reach patient for call back 14 days post discharge from the hospital. Left a detailed VM with call back number for patient to call if needed. If no voicemail available call attempts x 2 were made to contact the patient to assist with any questions or concerns patient may have. Patient did not follow up with their PCP post discharge. TCM program closed per policy.

## 2024-10-31 ENCOUNTER — OFFICE VISIT (OUTPATIENT)
Dept: PRIMARY CARE | Facility: CLINIC | Age: 54
End: 2024-10-31
Payer: COMMERCIAL

## 2024-10-31 VITALS
WEIGHT: 209 LBS | TEMPERATURE: 98 F | HEART RATE: 83 BPM | DIASTOLIC BLOOD PRESSURE: 80 MMHG | OXYGEN SATURATION: 98 % | BODY MASS INDEX: 33.73 KG/M2 | SYSTOLIC BLOOD PRESSURE: 126 MMHG

## 2024-10-31 DIAGNOSIS — R30.0 BURNING WITH URINATION: Primary | ICD-10-CM

## 2024-10-31 DIAGNOSIS — E11.9 TYPE 2 DIABETES MELLITUS WITHOUT COMPLICATION, WITHOUT LONG-TERM CURRENT USE OF INSULIN (MULTI): ICD-10-CM

## 2024-10-31 LAB
POC APPEARANCE, URINE: CLEAR
POC BILIRUBIN, URINE: ABNORMAL
POC BLOOD, URINE: NEGATIVE
POC COLOR, URINE: YELLOW
POC GLUCOSE, URINE: NEGATIVE MG/DL
POC HEMOGLOBIN A1C: 10.1 % (ref 4.2–6.5)
POC KETONES, URINE: ABNORMAL MG/DL
POC LEUKOCYTES, URINE: NEGATIVE
POC NITRITE,URINE: NEGATIVE
POC PH, URINE: 5.5 PH
POC PROTEIN, URINE: NEGATIVE MG/DL
POC SPECIFIC GRAVITY, URINE: >=1.03
POC UROBILINOGEN, URINE: 0.2 EU/DL

## 2024-10-31 PROCEDURE — 3048F LDL-C <100 MG/DL: CPT

## 2024-10-31 PROCEDURE — 87086 URINE CULTURE/COLONY COUNT: CPT

## 2024-10-31 PROCEDURE — 3074F SYST BP LT 130 MM HG: CPT

## 2024-10-31 PROCEDURE — 1036F TOBACCO NON-USER: CPT

## 2024-10-31 PROCEDURE — 3079F DIAST BP 80-89 MM HG: CPT

## 2024-10-31 PROCEDURE — 87102 FUNGUS ISOLATION CULTURE: CPT

## 2024-10-31 PROCEDURE — 83036 HEMOGLOBIN GLYCOSYLATED A1C: CPT

## 2024-10-31 PROCEDURE — 99213 OFFICE O/P EST LOW 20 MIN: CPT

## 2024-10-31 PROCEDURE — 3061F NEG MICROALBUMINURIA REV: CPT

## 2024-10-31 PROCEDURE — 81003 URINALYSIS AUTO W/O SCOPE: CPT

## 2024-10-31 RX ORDER — METFORMIN HYDROCHLORIDE 1000 MG/1
1000 TABLET ORAL
Qty: 100 TABLET | Refills: 1 | Status: SHIPPED | OUTPATIENT
Start: 2024-10-31 | End: 2025-02-08

## 2024-11-01 LAB — BACTERIA UR CULT: NO GROWTH

## 2024-11-02 LAB — YEAST SPEC QL CULT: NORMAL

## 2024-11-04 ENCOUNTER — TELEPHONE (OUTPATIENT)
Dept: PRIMARY CARE | Facility: CLINIC | Age: 54
End: 2024-11-04
Payer: COMMERCIAL

## 2024-11-08 LAB — YEAST SPEC QL CULT: NORMAL

## 2024-11-26 ENCOUNTER — APPOINTMENT (OUTPATIENT)
Dept: PRIMARY CARE | Facility: CLINIC | Age: 54
End: 2024-11-26
Payer: COMMERCIAL

## 2024-12-03 ENCOUNTER — OFFICE VISIT (OUTPATIENT)
Dept: PRIMARY CARE | Facility: CLINIC | Age: 54
End: 2024-12-03
Payer: COMMERCIAL

## 2024-12-03 VITALS
OXYGEN SATURATION: 98 % | SYSTOLIC BLOOD PRESSURE: 122 MMHG | DIASTOLIC BLOOD PRESSURE: 84 MMHG | HEART RATE: 108 BPM | WEIGHT: 206.4 LBS | BODY MASS INDEX: 33.31 KG/M2

## 2024-12-03 DIAGNOSIS — N89.8 VAGINAL MASS: ICD-10-CM

## 2024-12-03 DIAGNOSIS — R06.2 WHEEZING: Primary | ICD-10-CM

## 2024-12-03 PROCEDURE — 3048F LDL-C <100 MG/DL: CPT

## 2024-12-03 PROCEDURE — 1036F TOBACCO NON-USER: CPT

## 2024-12-03 PROCEDURE — 3061F NEG MICROALBUMINURIA REV: CPT

## 2024-12-03 PROCEDURE — 99213 OFFICE O/P EST LOW 20 MIN: CPT

## 2024-12-03 PROCEDURE — 3074F SYST BP LT 130 MM HG: CPT

## 2024-12-03 PROCEDURE — 3079F DIAST BP 80-89 MM HG: CPT

## 2024-12-03 RX ORDER — ALBUTEROL SULFATE 90 UG/1
2 INHALANT RESPIRATORY (INHALATION) EVERY 4 HOURS PRN
Qty: 8 G | Refills: 5 | Status: SHIPPED | OUTPATIENT
Start: 2024-12-03 | End: 2025-12-03

## 2024-12-03 NOTE — PROGRESS NOTES
"Subjective   Patient ID: Razia Gunn is a 54 y.o. female who presents for Mass (In between the vagina and leg and pain).  HPI  - initially noted a dime sized mass between vagina and right leg a few months ago. Painful and tender. Drained it a few times but never resolves. Recently was washing herself in the shower and noticed it was huge, tries to drain but hardly anything comes out.   - no fevers, chills  - no abdominal pain  - no urinary urgency/frequency, burning   - lots of vaginal itching and itching around bilateral labia and groins for months-  - 10/31 saw finn who noted she failed monistat, had been taking diflucan, yeast screen and urine cx normal.   - does not want to have sex because it is painful and dry   - post menopausal, she is s/p hysterectomy 5/6 yrs ago, still has ovaries.   - took doxycycline last week and now on doxycycline. Got a \"yeast pill\" from [employer].       Current Outpatient Medications:     acetaminophen (Tylenol) 325 mg tablet, Take 1 tablet (325 mg) by mouth every 6 hours if needed for mild pain (1 - 3)., Disp: , Rfl:     busPIRone (Buspar) 15 mg tablet, Take 1 tablet (15 mg) by mouth 3 times a day., Disp: , Rfl:     CHOLECALCIFEROL, VITAMIN D3, ORAL, Take by mouth once daily., Disp: , Rfl:     CRANBERRY ORAL, Take 1 tablet by mouth once daily., Disp: , Rfl:     CYANOCOBALAMIN, VITAMIN B-12, ORAL, Place 1 tablet under the tongue once daily., Disp: , Rfl:     dicyclomine (Bentyl) 20 mg tablet, Take 0.5 tablets (10 mg) by mouth 4 times a day as needed (abdominal pain)., Disp: 30 tablet, Rfl: 0    escitalopram (Lexapro) 20 mg tablet, Take 1 tablet (20 mg) by mouth once daily., Disp: , Rfl:     hydrOXYzine pamoate (Vistaril) 50 mg capsule, Take 1 capsule (50 mg) by mouth once daily at bedtime., Disp: , Rfl:     metFORMIN (Glucophage) 1,000 mg tablet, Take 1 tablet (1,000 mg) by mouth 2 times daily (morning and late afternoon)., Disp: 100 tablet, Rfl: 1    rOPINIRole " (Requip) 0.5 mg tablet, Take 1 tablet (0.5 mg) by mouth once daily at bedtime., Disp: 90 tablet, Rfl: 3    semaglutide (Ozempic) 1 mg/dose (2 mg/1.5 mL) pen injector, Inject 1 mg under the skin 1 (one) time per week., Disp: 1 each, Rfl: 0    albuterol (Ventolin HFA) 90 mcg/actuation inhaler, Inhale 2 puffs every 4 hours if needed for wheezing or shortness of breath., Disp: 8 g, Rfl: 5   Past Surgical History:   Procedure Laterality Date    CHOLECYSTECTOMY  05/28/2013    Cholecystectomy    TOTAL KNEE ARTHROPLASTY Right     TOTAL VAGINAL HYSTERECTOMY  01/23/2017    Vaginal Hysterectomy    TUBAL LIGATION  05/28/2013    Tubal Ligation      Past Medical History:   Diagnosis Date    Acute candidiasis of vulva and vagina 01/20/2015    Yeast vaginitis    Acute laryngitis 12/18/2014    Acute laryngitis    Acute maxillary sinusitis, unspecified 03/08/2020    Acute maxillary sinusitis    Candidiasis, unspecified 12/20/2017    Yeast infection    Depression     Diabetes mellitus (Multi)     Other conditions influencing health status     History of burning on urination    Other conditions influencing health status 09/30/2016    Cyst    Other conditions influencing health status 03/06/2014    History of dyspareunia    Other displaced fracture of base of first metacarpal bone, left hand, initial encounter for closed fracture 07/22/2021    Closed displaced fracture of base of first metacarpal bone of left hand, unspecified fracture morphology, initial encounter    Pelvic and perineal pain 01/23/2017    Pelvic pain in female    Personal history of other benign neoplasm 10/03/2016    History of uterine leiomyoma    Personal history of other diseases of the circulatory system 01/23/2017    History of hypertension    Personal history of other diseases of the musculoskeletal system and connective tissue 12/08/2015    History of low back pain    Personal history of other diseases of the respiratory system 02/24/2020    History of  influenza    Personal history of other diseases of the respiratory system 09/26/2018    History of acute bronchitis    Personal history of other diseases of the respiratory system 02/15/2014    Personal history of acute sinusitis    Personal history of other diseases of the respiratory system 04/22/2019    History of acute bronchitis    Personal history of other diseases of the respiratory system 11/21/2017    History of acute sinusitis    Personal history of other diseases of urinary system 08/22/2017    History of cystocele    Personal history of other endocrine, nutritional and metabolic disease 10/16/2014    History of type 2 diabetes mellitus    Personal history of other mental and behavioral disorders 05/28/2013    History of depression    Personal history of other specified conditions 03/25/2014    History of chest pain    Personal history of other specified conditions 08/22/2017    History of dysuria    Personal history of other specified conditions 03/19/2015    History of diarrhea    Personal history of other specified conditions 10/10/2014    History of abdominal pain    Phlebitis and thrombophlebitis of other sites 03/20/2020    Thrombophlebitis arm    Phlebitis and thrombophlebitis of other sites 03/20/2020    Phlebitis of arm    Right lower quadrant pain 11/01/2013    Abdominal pain, RLQ (right lower quadrant)    Unspecified condition associated with female genital organs and menstrual cycle 12/21/2015    Adnexal cyst    Unspecified symptoms and signs involving the genitourinary system 10/17/2016    UTI symptoms     Social History     Tobacco Use    Smoking status: Never    Smokeless tobacco: Never   Substance Use Topics    Alcohol use: Never    Drug use: Never      No family history on file.   Review of Systems  10 point ROS negative except as otherwise noted in the HPI.      Objective   /84   Pulse 108   Wt 93.6 kg (206 lb 6.4 oz)   SpO2 98%   BMI 33.31 kg/m²    Physical Exam  Vitals  reviewed.   Constitutional:       Appearance: Normal appearance.   Cardiovascular:      Rate and Rhythm: Normal rate and regular rhythm.      Pulses: Normal pulses.      Heart sounds: Normal heart sounds.   Pulmonary:      Effort: Pulmonary effort is normal.      Breath sounds: Normal breath sounds.   Abdominal:      General: Abdomen is flat.      Palpations: Abdomen is soft.   Genitourinary:     Exam position: Lithotomy position.      Pubic Area: Rash present.      Labia:         Right: Rash present.         Left: Rash present.           Comments: Pt with pruritic red excoriated rash on bilateral labia and groins in region depicted in red. There is also a 4in x 1 in oblong mass lateral to the R labia between labia and groin as depicted in black. Tender, minimally mobile.   Neurological:      Mental Status: She is alert.           Assessment/Plan   Problem List Items Addressed This Visit    None  Visit Diagnoses       Wheezing    -  Primary  - Cough for a few weeks. Has failed azithromycin. Currently on doxy. Lungs are clear but pt reports intermittent wheezing.  - will avoid prednisone given DM if possible  - will trial albuterol inhaler, states that typically works well for her.    Relevant Medications    albuterol (Ventolin HFA) 90 mcg/actuation inhaler    Vaginal mass      - Pt with rash on inner thighs, labia for a few months. Vaginal itching. Very pruritic, excoriated. Has failed monistat, doxycycline, fluconazole. Yeast neg. Urine culture neg.   - Also has a large, rapidly growing oblong mass lateral to R labia   - d/w Dr Aguero and her staff. Pt is going to see an NP from gyn onc at [facility] next week for eval and bx.     Relevant Orders    Referral to Gynecologic Oncology            Discussed at visit any disease processes that were of concern as well as the risks, benefits and instructions on any new medication provided. Patient (and/or caretaker of patient if present) stated all questions were  answered, and they voiced understanding of instructions.     Elizabeth Coe PA-C

## 2024-12-05 ENCOUNTER — TELEPHONE (OUTPATIENT)
Dept: GYNECOLOGIC ONCOLOGY | Facility: HOSPITAL | Age: 54
End: 2024-12-05
Payer: COMMERCIAL

## 2024-12-05 NOTE — TELEPHONE ENCOUNTER
Patient called to report increased swelling into perineum and pelvis that she noticed last night.     Patient has right labial mass, perineal dryness, itching and was referred to gyn/oncology for biopsy of mass scheduled on 12/10/24 with Lili Whatley CNP.   Patient denies worsening pain.  States she has been experiencing vaginal/perineal burning/itching with voids for several weeks due to rash and dryness.   Patient states she has applied Aquaphor, Monistat and has been prescribed Diflucan by family medicine PA with minimal relief of symptoms.     Urine culture is negative.     Denies drainage/bleeding from mass or from vagina.    Denies fever.   Voiding and moving bowels without difficulty.  Advised  patient to continue to monitor symptoms and if she develops worsening pain, fever, inability to urinate or move bowels to proceed to ER for evaluation.     Patient verbalized her understanding of information given.

## 2024-12-10 ENCOUNTER — OFFICE VISIT (OUTPATIENT)
Dept: GYNECOLOGIC ONCOLOGY | Facility: CLINIC | Age: 54
End: 2024-12-10
Payer: COMMERCIAL

## 2024-12-10 VITALS
OXYGEN SATURATION: 96 % | HEIGHT: 62 IN | TEMPERATURE: 97.2 F | DIASTOLIC BLOOD PRESSURE: 96 MMHG | RESPIRATION RATE: 17 BRPM | HEART RATE: 106 BPM | WEIGHT: 206.35 LBS | BODY MASS INDEX: 37.97 KG/M2 | SYSTOLIC BLOOD PRESSURE: 141 MMHG

## 2024-12-10 DIAGNOSIS — N90.89 VULVAR MASS: Primary | ICD-10-CM

## 2024-12-10 DIAGNOSIS — N89.8 VAGINAL MASS: ICD-10-CM

## 2024-12-10 DIAGNOSIS — R21 VULVAR RASH: ICD-10-CM

## 2024-12-10 PROCEDURE — 99214 OFFICE O/P EST MOD 30 MIN: CPT | Performed by: NURSE PRACTITIONER

## 2024-12-10 PROCEDURE — 3080F DIAST BP >= 90 MM HG: CPT | Performed by: NURSE PRACTITIONER

## 2024-12-10 PROCEDURE — 3077F SYST BP >= 140 MM HG: CPT | Performed by: NURSE PRACTITIONER

## 2024-12-10 PROCEDURE — 56605 BIOPSY OF VULVA/PERINEUM: CPT | Performed by: NURSE PRACTITIONER

## 2024-12-10 PROCEDURE — 3061F NEG MICROALBUMINURIA REV: CPT | Performed by: NURSE PRACTITIONER

## 2024-12-10 PROCEDURE — 3008F BODY MASS INDEX DOCD: CPT | Performed by: NURSE PRACTITIONER

## 2024-12-10 PROCEDURE — 99204 OFFICE O/P NEW MOD 45 MIN: CPT | Performed by: NURSE PRACTITIONER

## 2024-12-10 PROCEDURE — 3048F LDL-C <100 MG/DL: CPT | Performed by: NURSE PRACTITIONER

## 2024-12-10 RX ORDER — TRIAMCINOLONE ACETONIDE 1 MG/G
CREAM TOPICAL 2 TIMES DAILY
Qty: 80 G | Refills: 2 | Status: SHIPPED | OUTPATIENT
Start: 2024-12-10 | End: 2025-04-09

## 2024-12-10 SDOH — ECONOMIC STABILITY: FOOD INSECURITY: WITHIN THE PAST 12 MONTHS, YOU WORRIED THAT YOUR FOOD WOULD RUN OUT BEFORE YOU GOT THE MONEY TO BUY MORE.: NEVER TRUE

## 2024-12-10 SDOH — ECONOMIC STABILITY: FOOD INSECURITY: WITHIN THE PAST 12 MONTHS, THE FOOD YOU BOUGHT JUST DIDN'T LAST AND YOU DIDN'T HAVE MONEY TO GET MORE.: NEVER TRUE

## 2024-12-10 ASSESSMENT — PAIN SCALES - GENERAL: PAINLEVEL_OUTOF10: 7

## 2024-12-10 NOTE — PROGRESS NOTES
"Patient ID: Razia Gunn is a 54 y.o. female.  Referring Physician: Elizabeth Coe PA-C  [id number] [place] [address]  Primary Care Provider: Mary Mena PA-C      Subjective    HPI    Razia is a 54 y.o. female with a 3 month history of vulvar itching and rash. Referred for Gyn Onc evaluation due to elongated mass appreciated under the skin in right groin. Patient reports sudden onset of symptoms, does not recall any aggravating factors prior. Denies any changes in soap/detergent. Patient does not wear a pad or report urinary incontinence. Reports regular bowel habits. Had a hysterectomy in 2017 for fibroids, denies any vaginal bleeding.      PMH:  Type 2 DM  Anxiety  Depression    GYN Hx  , vaginal deliveries, denies history of abnormal paps, denies OCP or HRT use    Family Hx   - Melanoma, otherwise no family history of cancer    Past Surgical History:   Procedure Laterality Date    CHOLECYSTECTOMY  2013    Cholecystectomy    TOTAL KNEE ARTHROPLASTY Right     TOTAL VAGINAL HYSTERECTOMY  2017    Vaginal Hysterectomy    TUBAL LIGATION  2013    Tubal Ligation       Objective   BSA: 2.02 meters squared  BP (!) 141/96 (BP Location: Left arm, Patient Position: Sitting, BP Cuff Size: Adult)   Pulse 106   Temp 36.2 °C (97.2 °F) (Temporal)   Resp 17   Ht (S) 1.567 m (5' 1.69\")   Wt 93.6 kg (206 lb 5.6 oz)   SpO2 96%   BMI 38.12 kg/m²      No family history on file.    Razia Gunn  reports that she has never smoked. She has never used smokeless tobacco.  She  reports no history of alcohol use.  She  reports no history of drug use.    Physical Exam  Vitals and nursing note reviewed.   Constitutional:       Appearance: Normal appearance. She is normal weight.   HENT:      Mouth/Throat:      Mouth: Mucous membranes are moist.      Pharynx: Oropharynx is clear.   Eyes:      Conjunctiva/sclera: Conjunctivae normal.      Pupils: Pupils are equal, round, and reactive " to light.   Cardiovascular:      Rate and Rhythm: Normal rate and regular rhythm.   Pulmonary:      Effort: Pulmonary effort is normal.      Breath sounds: Normal breath sounds.   Abdominal:      General: Abdomen is flat. There is no distension.      Palpations: Abdomen is soft. There is no mass.      Tenderness: There is no abdominal tenderness.   Genitourinary:     Vagina: Normal.      Uterus: Absent.           Comments: Erythematous, excoriated rash present on bilateral labia and groin folds. There is also a 4in x 1 in oblong mass lateral to the R labia between labia and groin. Tender, fixed.    Vulvar biopsy:  Area prepped with Betadine, 1% Lidocaine used for anesthesia, 4 mm punch biopsy used to obtain specimen on right vulva, Monsels and pressure used for hemostasis, patient tolerated procedure well.    Musculoskeletal:         General: Normal range of motion.   Skin:     General: Skin is warm and dry.   Neurological:      Mental Status: She is alert.   Psychiatric:         Mood and Affect: Mood normal.         Behavior: Behavior normal.       Performance Status:  Asymptomatic    Assessment/Plan      Razia is a 54 year old female referred for evaluation of vulvar rash and mass.    Plan:  Differential diagnoses include lichens sclerosis, HS, vulvar dysplasia or neoplasm. Workup by another provider ruled out yeast, negative urine culture. Previously failed management with monistat, doxycycline, fluconazole.    Vulvar biopsy obtained    Proceed with MRI pelvis to better evaluate mass of right groin    Phone visit after MRI to discuss results    RX Triamcinolone cream to be used PRN itching

## 2024-12-19 LAB
LABORATORY COMMENT REPORT: NORMAL
PATH REPORT.FINAL DX SPEC: NORMAL
PATH REPORT.GROSS SPEC: NORMAL
PATH REPORT.RELEVANT HX SPEC: NORMAL
PATH REPORT.TOTAL CANCER: NORMAL

## 2024-12-26 ENCOUNTER — HOSPITAL ENCOUNTER (OUTPATIENT)
Dept: RADIOLOGY | Facility: HOSPITAL | Age: 54
Discharge: HOME | End: 2024-12-26
Payer: COMMERCIAL

## 2024-12-26 DIAGNOSIS — N90.89 VULVAR MASS: ICD-10-CM

## 2024-12-26 PROCEDURE — 72197 MRI PELVIS W/O & W/DYE: CPT

## 2024-12-26 PROCEDURE — A9575 INJ GADOTERATE MEGLUMI 0.1ML: HCPCS | Performed by: NURSE PRACTITIONER

## 2024-12-26 PROCEDURE — 2550000001 HC RX 255 CONTRASTS: Performed by: NURSE PRACTITIONER

## 2024-12-26 RX ORDER — GADOTERATE MEGLUMINE 376.9 MG/ML
0.2 INJECTION INTRAVENOUS
Status: COMPLETED | OUTPATIENT
Start: 2024-12-26 | End: 2024-12-26

## 2024-12-26 ASSESSMENT — ENCOUNTER SYMPTOMS
OCCASIONAL FEELINGS OF UNSTEADINESS: 0
LOSS OF SENSATION IN FEET: 0
DEPRESSION: 0

## 2024-12-30 ENCOUNTER — TELEMEDICINE (OUTPATIENT)
Dept: GYNECOLOGIC ONCOLOGY | Facility: CLINIC | Age: 54
End: 2024-12-30
Payer: COMMERCIAL

## 2024-12-30 DIAGNOSIS — R21 VULVAR RASH: ICD-10-CM

## 2024-12-30 DIAGNOSIS — L43.8 OTHER LICHEN PLANUS: Primary | ICD-10-CM

## 2024-12-30 PROCEDURE — 3061F NEG MICROALBUMINURIA REV: CPT | Performed by: NURSE PRACTITIONER

## 2024-12-30 PROCEDURE — 3048F LDL-C <100 MG/DL: CPT | Performed by: NURSE PRACTITIONER

## 2024-12-30 PROCEDURE — 99213 OFFICE O/P EST LOW 20 MIN: CPT | Performed by: NURSE PRACTITIONER

## 2024-12-30 RX ORDER — CLOBETASOL PROPIONATE 0.5 MG/G
OINTMENT TOPICAL 2 TIMES DAILY
Qty: 30 G | Refills: 1 | Status: SHIPPED | OUTPATIENT
Start: 2024-12-30

## 2024-12-30 NOTE — PROGRESS NOTES
Patient ID: Razia Gunn is a 54 y.o. female.  Referring Physician: No referring provider defined for this encounter.  Primary Care Provider: Mary Mena PA-C      Subjective    HPI    Razia is a 54 y.o. female with a 3 month history of vulvar itching and rash. Referred for Gyn Onc evaluation due to elongated mass appreciated under the skin in right groin. Patient reports sudden onset of symptoms, does not recall any aggravating factors prior. Denies any changes in soap/detergent. Patient does not wear a pad or report urinary incontinence. Reports regular bowel habits. Had a hysterectomy in 2017 for fibroids, denies any vaginal bleeding.      24 Pelvic MRI  IMPRESSION:  No vulvar mass, cyst, fluid collection, or abnormal enhancement  identified.    12/10/24 Vulvar Biopsy:   FINAL DIAGNOSIS      SKIN, VULVA, BIOPSY:  -- LICHENOID INTERFACE TISSUE REACTION PATTERN, SEE COMMENT     COMMENT:  Histologic sections of the skin show compact hyperkeratosis, irregular acanthosis, mild spongiosis, occasional intraepidermal eosinophils, occasional dyskeratotic keratinocytes, and a superficial mild inflammatory infiltrate composed of lymphocytes, histiocytes, and occasional eosinophils. A PAS stain is negative for fungal elements.     These histologic features are those of a lichenoid interface tissue reaction pattern. In the appropriate clinical context, the possibility of a lichenoid dermatitis such as lichen planus (favor), contact dermatitis or a hypersensitivity type reaction could be considered. Characteristic features of lichen sclerosis are not identified in this sampling. There is no evidence of malignancy.     : LEROY Chacko MD.       PMH:  Type 2 DM  Anxiety  Depression    GYN Hx  , vaginal deliveries, denies history of abnormal paps, denies OCP or HRT use    Family Hx   - Melanoma, otherwise no family history of cancer    Past Surgical History:   Procedure Laterality Date     CHOLECYSTECTOMY  05/28/2013    Cholecystectomy    TOTAL KNEE ARTHROPLASTY Right     TOTAL VAGINAL HYSTERECTOMY  01/23/2017    Vaginal Hysterectomy    TUBAL LIGATION  05/28/2013    Tubal Ligation       Objective   BSA: There is no height or weight on file to calculate BSA.  There were no vitals taken for this visit.     No family history on file.    Razia Gunn  reports that she has never smoked. She has never used smokeless tobacco.  She  reports no history of alcohol use.  She  reports no history of drug use.    Physical Exam  Vitals and nursing note reviewed.   Constitutional:       Appearance: Normal appearance. She is normal weight.   HENT:      Mouth/Throat:      Mouth: Mucous membranes are moist.      Pharynx: Oropharynx is clear.   Eyes:      Conjunctiva/sclera: Conjunctivae normal.      Pupils: Pupils are equal, round, and reactive to light.   Cardiovascular:      Rate and Rhythm: Normal rate and regular rhythm.   Pulmonary:      Effort: Pulmonary effort is normal.      Breath sounds: Normal breath sounds.   Abdominal:      General: Abdomen is flat. There is no distension.      Palpations: Abdomen is soft. There is no mass.      Tenderness: There is no abdominal tenderness.   Genitourinary:     Vagina: Normal.      Uterus: Absent.           Comments: Erythematous, excoriated rash present on bilateral labia and groin folds. There is also a 4in x 1 in oblong mass lateral to the R labia between labia and groin. Tender, fixed.    Vulvar biopsy:  Area prepped with Betadine, 1% Lidocaine used for anesthesia, 4 mm punch biopsy used to obtain specimen on right vulva, Monsels and pressure used for hemostasis, patient tolerated procedure well.    Musculoskeletal:         General: Normal range of motion.   Skin:     General: Skin is warm and dry.   Neurological:      Mental Status: She is alert.   Psychiatric:         Mood and Affect: Mood normal.         Behavior: Behavior normal.       Performance  Status:  Asymptomatic    Assessment/Plan      Razia is a 54 year old female referred for evaluation of vulvar rash and mass.    Plan:  Workup by another provider ruled out yeast, negative urine culture. Previously failed management with monistat, doxycycline, fluconazole.    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 Will start Clobetasol twice daily for management of symptoms and refer to dermatology.     MRI pelvis negative    I performed this visit using real-time telehealth tools, including an audio/video connection between the patient and myself. I spent 5 minutes virtually with this patient and/or family. More than 50% of the time was spent in counseling and/or coordination of care.

## 2025-01-09 ENCOUNTER — TELEPHONE (OUTPATIENT)
Dept: GYNECOLOGIC ONCOLOGY | Facility: HOSPITAL | Age: 55
End: 2025-01-09
Payer: COMMERCIAL

## 2025-01-09 DIAGNOSIS — R21 VULVAR RASH: Primary | ICD-10-CM

## 2025-01-09 DIAGNOSIS — L43.8 OTHER LICHEN PLANUS: ICD-10-CM

## 2025-01-09 NOTE — TELEPHONE ENCOUNTER
Patient called and left message on nurse line requesting a dermatology appointment ASAP.   Returned patient call, message left on patient voice mail notifying patient that she is currently scheduled to see dermatology on 6/2025.    Notified patient that office will attempt to schedule a sooner appointment with dermatology.     Lili Whatley CNP recommends referral to dermatology.   Message routed to Ritu Landin.

## 2025-01-17 ENCOUNTER — OFFICE VISIT (OUTPATIENT)
Dept: DERMATOLOGY | Facility: CLINIC | Age: 55
End: 2025-01-17
Payer: COMMERCIAL

## 2025-01-17 DIAGNOSIS — D48.5 NEOPLASM OF UNCERTAIN BEHAVIOR OF SKIN: ICD-10-CM

## 2025-01-17 DIAGNOSIS — R21 RASH AND OTHER NONSPECIFIC SKIN ERUPTION: Primary | ICD-10-CM

## 2025-01-17 DIAGNOSIS — L57.8 DIFFUSE PHOTODAMAGE OF SKIN: ICD-10-CM

## 2025-01-17 PROCEDURE — 11105 PUNCH BX SKIN EA SEP/ADDL: CPT | Performed by: DERMATOLOGY

## 2025-01-17 PROCEDURE — 11104 PUNCH BX SKIN SINGLE LESION: CPT | Performed by: DERMATOLOGY

## 2025-01-17 PROCEDURE — 99204 OFFICE O/P NEW MOD 45 MIN: CPT | Performed by: DERMATOLOGY

## 2025-01-17 RX ORDER — TRIAMCINOLONE ACETONIDE 1 MG/G
OINTMENT TOPICAL
Qty: 80 G | Refills: 0 | Status: SHIPPED | OUTPATIENT
Start: 2025-01-17

## 2025-01-17 ASSESSMENT — DERMATOLOGY PATIENT ASSESSMENT
DO YOU HAVE ANY NEW OR CHANGING LESIONS: NO
ARE YOU TRYING TO GET PREGNANT: NO
DO YOU USE SUNSCREEN: OCCASIONALLY
DO YOU USE A TANNING BED: NO
DO YOU HAVE IRREGULAR MENSTRUAL CYCLES: NO
ARE YOU ON BIRTH CONTROL: NO

## 2025-01-17 ASSESSMENT — DERMATOLOGY QUALITY OF LIFE (QOL) ASSESSMENT: ARE THERE EXCLUSIONS OR EXCEPTIONS FOR THE QUALITY OF LIFE ASSESSMENT: NO

## 2025-01-17 NOTE — PROGRESS NOTES
"Subjective     Razia Gunn is a 54 y.o. female who presents for the following: Skin Exam.  The patient reports she began experiencing itching in her vaginal area over 6 months ago.  She recently saw her gynecology nurse practitioner, Lili Whatley, who performed a biopsy from her vulva on 12/10/24, which revealed \"lichenoid interface tissue reaction pattern.\"    Today, the patient states the areas are still very itchy and red and purplish and discolored.  She denies any other areas of involvement, including her face, mouth, or trunk.  She denies any other new, changing, or concerning skin lesions; no bleeding, itching, or burning lesions.      Review of Systems:  No other skin or systemic complaints other than what is documented elsewhere in the note.    The following portions of the chart were reviewed this encounter and updated as appropriate:       Skin Cancer History  No skin cancer on file.    Specialty Problems    None      Past Dermatologic / Past Relevant Medical History:    No history of lichen planus, eczema, psoriasis, or skin cancer    Family History:    No family history of lichen planus or skin cancer    Social History:    The patient states she lives in [place] and has 3 children    Allergies:  Ceftriaxone and Dapagliflozin    Current Medications / CAM's:    Current Outpatient Medications:     acetaminophen (Tylenol) 325 mg tablet, Take 1 tablet (325 mg) by mouth every 6 hours if needed for mild pain (1 - 3)., Disp: , Rfl:     albuterol (Ventolin HFA) 90 mcg/actuation inhaler, Inhale 2 puffs every 4 hours if needed for wheezing or shortness of breath., Disp: 8 g, Rfl: 5    busPIRone (Buspar) 15 mg tablet, Take 1 tablet (15 mg) by mouth 3 times a day., Disp: , Rfl:     CHOLECALCIFEROL, VITAMIN D3, ORAL, Take by mouth once daily., Disp: , Rfl:     clobetasol (Temovate) 0.05 % ointment, Apply topically 2 times a day., Disp: 30 g, Rfl: 1    CRANBERRY ORAL, Take 1 tablet by mouth once " daily., Disp: , Rfl:     CYANOCOBALAMIN, VITAMIN B-12, ORAL, Place 1 tablet under the tongue once daily., Disp: , Rfl:     dicyclomine (Bentyl) 20 mg tablet, Take 0.5 tablets (10 mg) by mouth 4 times a day as needed (abdominal pain)., Disp: 30 tablet, Rfl: 0    escitalopram (Lexapro) 20 mg tablet, Take 1 tablet (20 mg) by mouth once daily., Disp: , Rfl:     hydrOXYzine pamoate (Vistaril) 50 mg capsule, Take 1 capsule (50 mg) by mouth once daily at bedtime., Disp: , Rfl:     metFORMIN (Glucophage) 1,000 mg tablet, Take 1 tablet (1,000 mg) by mouth 2 times daily (morning and late afternoon)., Disp: 100 tablet, Rfl: 1    rOPINIRole (Requip) 0.5 mg tablet, Take 1 tablet (0.5 mg) by mouth once daily at bedtime., Disp: 90 tablet, Rfl: 3    semaglutide (Ozempic) 1 mg/dose (2 mg/1.5 mL) pen injector, Inject 1 mg under the skin 1 (one) time per week., Disp: 1 each, Rfl: 0    triamcinolone (Kenalog) 0.1 % cream, Apply topically 2 times a day. Apply to rash., Disp: 80 g, Rfl: 2    triamcinolone (Kenalog) 0.1 % ointment, Apply twice daily to affected areas of body (avoid face, groin, body folds) for 2 weeks, Disp: 80 g, Rfl: 0     Objective   Well appearing patient in no apparent distress; mood and affect are within normal limits.    A skin examination was performed including: Face, neck, external genitalia, and bilateral medial proximal thighs.  She declined examination of any other parts of her skin today.  Oriana Porter LPN, was physically present for the entire physical exam. All findings within normal limits unless otherwise noted below.    Assessment/Plan   1. Rash and other nonspecific skin eruption  Left Medial Proximal Thigh  On her bilateral labia majora, vulva, and medial proximal thighs, there are many erythematous to purplish, slightly scaly macules and thin papules, some coalescing into plaques, as well as multiple hyperpigmented macules consistent with postinflammatory hyperpigmentation          Erythematous to  purplish papules and plaques - bilateral labia majora, vulva, and medial proximal thighs.  The clinical differential diagnosis for these lesions includes possibly lichen planus versus lichen sclerosus at atrophicus.  The nature of each of these conditions and management options, including possible biopsy for further diagnostic evaluation, were discussed extensively with the patient today.  After discussing the risks and benefits of various management options, the patient expressed understanding and wishes to undergo biopsy today.  Thus, at this time, I recommend punch biopsy of 2 representative lesions on the patient's left medial proximal thigh and left labia majora in the office today for further diagnostic evaluation.  In the meantime, while we await biopsy results, I recommend empiric topical steroid therapy with Triamcinolone 0.1% ointment, which the patient was instructed to apply twice daily to the affected areas of her genitalia and thighs (avoid the face) for the next 2 weeks.  The risks, benefits, and side effects of this medication, including possible skin atrophy with its overuse, were discussed.  The patient expressed understanding, is in agreement with this plan, and wishes to proceed with the biopsy today.    Skin biopsy - Left Medial Proximal Thigh  Type of biopsy: punch    Informed consent: discussed and consent obtained    Timeout: patient name, date of birth, surgical site, and procedure verified    Procedure prep:  Patient was prepped and draped  Anesthesia: the lesion was anesthetized in a standard fashion    Anesthetic:  1% lidocaine w/ epinephrine 1-100,000 local infiltration  Punch size:  4 mm  Suture size:  4-0  Suture type: Prolene (polypropylene)    Suture removal (days):  14  Hemostasis achieved with: suture    Outcome: patient tolerated procedure well    Post-procedure details: sterile dressing applied and wound care instructions given    Dressing type: bandage and petrolatum      Staff  Communication: Dermatology Local Anesthesia: 1 % Lidocaine / Epinephrine - Amount:0.5ml - Left Medial Proximal Thigh    triamcinolone (Kenalog) 0.1 % ointment - Left Medial Proximal Thigh  Apply twice daily to affected areas of body (avoid face, groin, body folds) for 2 weeks    Specimen 1 - Dermatopathology- DERM LAB  Differential Diagnosis: LP v Lichen Sclerosus  Check Margins Yes/No?:    Comments:    Dermpath Lab: Routine Histopathology (formalin-fixed tissue)    2. Neoplasm of uncertain behavior of skin  Left Labia Majora          Lesion biopsy  Type of biopsy: punch    Informed consent: discussed and consent obtained    Timeout: patient name, date of birth, surgical site, and procedure verified    Anesthesia: the lesion was anesthetized in a standard fashion    Anesthetic:  1% lidocaine w/ epinephrine 1-100,000 local infiltration  Punch size:  3 mm  Suture size:  5-0  Suture type: Prolene (polypropylene)    Suture removal (days):  14  Hemostasis achieved with: suture    Outcome: patient tolerated procedure well    Post-procedure details: sterile dressing applied and wound care instructions given    Dressing type: bandage and petrolatum      Staff Communication: Dermatology Local Anesthesia: 1 % Lidocaine / Epinephrine - Amount:0.5ml    Specimen 2 - Dermatopathology- DERM LAB  Differential Diagnosis: LP v Lichen Sclerosus  Check Margins Yes/No?:    Comments:    Dermpath Lab: Routine Histopathology (formalin-fixed tissue)    3. Diffuse photodamage of skin  Photodistributed  Diffuse photodamage with actinic changes with telangiectasia and mottled pigmentation in sun-exposed areas.    Photodamage.  The signs and symptoms of skin cancer were reviewed and the patient was advised to practice sun protection and sun avoidance, use daily sunscreen, and perform regular self skin exams.  Sun protection was discussed, including avoiding the mid-day sun, wearing a sunscreen with SPF at least 50, and stressing the need for  reapplication of sunscreen and applying more than they think they need.

## 2025-01-21 DIAGNOSIS — E11.9 TYPE 2 DIABETES MELLITUS WITHOUT COMPLICATION, WITHOUT LONG-TERM CURRENT USE OF INSULIN (MULTI): ICD-10-CM

## 2025-01-24 ENCOUNTER — TELEPHONE (OUTPATIENT)
Dept: DERMATOLOGY | Facility: CLINIC | Age: 55
End: 2025-01-24
Payer: COMMERCIAL

## 2025-01-27 DIAGNOSIS — G25.81 RESTLESS LEG: ICD-10-CM

## 2025-01-29 RX ORDER — ROPINIROLE 0.5 MG/1
0.5 TABLET, FILM COATED ORAL NIGHTLY
Qty: 90 TABLET | Refills: 0 | Status: SHIPPED | OUTPATIENT
Start: 2025-01-29

## 2025-01-30 ENCOUNTER — APPOINTMENT (OUTPATIENT)
Dept: PRIMARY CARE | Facility: CLINIC | Age: 55
End: 2025-01-30
Payer: COMMERCIAL

## 2025-01-31 ENCOUNTER — APPOINTMENT (OUTPATIENT)
Dept: DERMATOLOGY | Facility: CLINIC | Age: 55
End: 2025-01-31
Payer: COMMERCIAL

## 2025-01-31 DIAGNOSIS — L21.9 SEBORRHEIC DERMATITIS: ICD-10-CM

## 2025-01-31 DIAGNOSIS — L57.8 DIFFUSE PHOTODAMAGE OF SKIN: ICD-10-CM

## 2025-01-31 DIAGNOSIS — L43.2 LICHENOID DRUG REACTION: Primary | ICD-10-CM

## 2025-01-31 DIAGNOSIS — L43.9 LICHEN PLANUS: ICD-10-CM

## 2025-01-31 DIAGNOSIS — L81.0 POST-INFLAMMATORY HYPERPIGMENTATION: ICD-10-CM

## 2025-01-31 PROCEDURE — 99214 OFFICE O/P EST MOD 30 MIN: CPT | Performed by: DERMATOLOGY

## 2025-02-01 RX ORDER — KETOCONAZOLE 20 MG/ML
SHAMPOO, SUSPENSION TOPICAL
Qty: 120 ML | Refills: 11 | Status: SHIPPED | OUTPATIENT
Start: 2025-02-01

## 2025-02-01 NOTE — PROGRESS NOTES
"Subjective     Razia Gunn is a 54 y.o. female who presents for the following: Follow-up.  The patient reports she began experiencing itching in her vaginal area over 6 months ago.  She recently saw her gynecology nurse practitioner, Lili Whatley, who performed a biopsy from her vulva on 12/10/24, which revealed \"lichenoid interface tissue reaction pattern.\"    She subsequently underwent punch biopsy of 2 representative lesions at her initial visit in our office on 1/17/25, which revealed findings \"consistent with lichen planus\" on her left medial proximal thigh and a \"lichenoid tissue reaction with a rare eosinophil\" on her left labia majora.    Today, the patient states the biopsy sites healed well.  She reports the areas are still very itchy and red and purplish and discolored.  She denies any other areas of involvement, including her face, mouth, or trunk.    Of note, upon further questioning, the patient reports she began taking a \"Receptor Detox\" dietary supplement by [employer] at the end of the year 2023 to help with libido.  She denies having started any other new medications recently, including any prescription or over-the-counter medications, herbals, vitamins, or other supplements.    She also notes a dry, flaky, itchy scalp, especially during the wintertime.  She denies any new, changing, or concerning skin lesions since her last visit; no bleeding, itching, or burning lesions.      Review of Systems:  No other skin or systemic complaints other than what is documented elsewhere in the note.    The following portions of the chart were reviewed this encounter and updated as appropriate:       Skin Cancer History  No skin cancer on file.    Specialty Problems    None      Past Dermatologic / Past Relevant Medical History:    No history of lichen planus, eczema, psoriasis, or skin cancer    Family History:    No family history of lichen planus or skin cancer    Social History:    The " patient states she lives in [place] and has 3 children    Allergies:  Ceftriaxone and Dapagliflozin    Current Medications / CAM's:    Current Outpatient Medications:     acetaminophen (Tylenol) 325 mg tablet, Take 1 tablet (325 mg) by mouth every 6 hours if needed for mild pain (1 - 3)., Disp: , Rfl:     albuterol (Ventolin HFA) 90 mcg/actuation inhaler, Inhale 2 puffs every 4 hours if needed for wheezing or shortness of breath., Disp: 8 g, Rfl: 5    busPIRone (Buspar) 15 mg tablet, Take 1 tablet (15 mg) by mouth 3 times a day., Disp: , Rfl:     CHOLECALCIFEROL, VITAMIN D3, ORAL, Take by mouth once daily., Disp: , Rfl:     clobetasol (Temovate) 0.05 % ointment, Apply topically 2 times a day., Disp: 30 g, Rfl: 1    CRANBERRY ORAL, Take 1 tablet by mouth once daily., Disp: , Rfl:     CYANOCOBALAMIN, VITAMIN B-12, ORAL, Place 1 tablet under the tongue once daily., Disp: , Rfl:     dicyclomine (Bentyl) 20 mg tablet, Take 0.5 tablets (10 mg) by mouth 4 times a day as needed (abdominal pain)., Disp: 30 tablet, Rfl: 0    escitalopram (Lexapro) 20 mg tablet, Take 1 tablet (20 mg) by mouth once daily., Disp: , Rfl:     hydrOXYzine pamoate (Vistaril) 50 mg capsule, Take 1 capsule (50 mg) by mouth once daily at bedtime., Disp: , Rfl:     metFORMIN (Glucophage) 1,000 mg tablet, Take 1 tablet (1,000 mg) by mouth 2 times daily (morning and late afternoon)., Disp: 100 tablet, Rfl: 1    rOPINIRole (Requip) 0.5 mg tablet, TAKE 1 TABLET BY MOUTH ONCE DAILY AT BEDTIME, Disp: 90 tablet, Rfl: 0    semaglutide (Ozempic) 1 mg/dose (2 mg/1.5 mL) pen injector, Inject 1 mg under the skin 1 (one) time per week., Disp: 1 each, Rfl: 0    triamcinolone (Kenalog) 0.1 % cream, Apply topically 2 times a day. Apply to rash., Disp: 80 g, Rfl: 2    triamcinolone (Kenalog) 0.1 % ointment, Apply twice daily to affected areas of body (avoid face, groin, body folds) for 2 weeks, Disp: 80 g, Rfl: 0    ketoconazole (NIZOral) 2 % shampoo, Wash affected  areas of scalp 2-3 times weekly as directed, Disp: 120 mL, Rfl: 11     Objective   Well appearing patient in no apparent distress; mood and affect are within normal limits.    A skin examination was performed including: Face, neck, external genitalia, and bilateral lower extremities. All findings within normal limits unless otherwise noted below.    Assessment/Plan   1. Lichenoid drug reaction  Left Medial Thigh  On her bilateral labia majora, vulva, and medial proximal thighs, there are many erythematous to purplish, slightly scaly macules and thin papules, some coalescing into plaques, as well as multiple hyperpigmented macules consistent with postinflammatory hyperpigmentation    Biopsy-proven lichenoid tissue reaction with a rare eosinophil - bilateral labia majora, vulva, and medial proximal thighs.  Based on the patient's history, recent exam and repeat exam today, and the histologic findings in her recent biopsies, the favored clinico-pathologic differential diagnosis for these lesions includes possibly lichenoid drug eruption, likely secondary to the dietary supplement she began taking at the end of the year 2023, versus less likely lichen planus.  The benign, but potentially chronic and intermittently flaring nature of each of these conditions and management options were discussed extensively with the patient in the office today.  At this time, given the possibility of a lichenoid drug eruption, I recommend she discontinue the dietary supplement she began taking at the end of last year.    In the meantime, for symptomatic relief, I recommend topical steroid therapy with Triamcinolone 0.1% ointment, which the patient was instructed to apply twice daily to the affected areas (avoid the face) for the next 2 weeks, followed by taper to twice daily on weekends only for persistent areas; the patient may repeat treatment in a 2-week burst-and-taper fashion every 6-8 weeks as needed for future flares.  The risks,  benefits, and side effects of this medication, including possible skin atrophy with overuse of topical steroids, were discussed.  If this condition has not resolved within the next 3 to 6 months, she was instructed to call our office to schedule a return visit for re-evaluation.  The patient expressed understanding and is in agreement with this plan.  Of note, her sutures were removed in the office today as well.    2. Lichen planus  Left Medial Thigh    3. Post-inflammatory hyperpigmentation  Right Medial Thigh  On her bilateral labia majora, vulva, and medial proximal thighs, there are many hyperpigmented macules consistent with postinflammatory hyperpigmentation    Post-inflammatory hyperpigmentation - bilateral labia majora, vulva, and medial proximal thighs.  The benign nature of PIH was discussed with the patient today and reassurance provided.  She was informed it will likely take at least 1 to 2 years for the PIH to fade or resolve, but no further treatment for these lesions is indicated at this time.  She expressed understanding and is in agreement with this plan.    4. Seborrheic dermatitis  Scalp  On the patient's scalp, there are pink, scaly patches with whitish-yellowish, greasy scale    Seborrheic Dermatitis - scalp.  The potentially chronic and intermittently flaring nature of this condition and treatment options were discussed extensively with the patient today.  At this time, I recommend topical anti-fungal therapy with Ketoconazole 2% shampoo, which the patient was instructed to use 2-3 days per week, alternating with over-the-counter anti-dandruff shampoos, such as Head & Shoulders, Selsun Blue, and Neutrogena T-gel, every month.  The risks, benefits, and side effects of this medication were discussed.  The patient expressed understanding and is in agreement with this plan.    ketoconazole (NIZOral) 2 % shampoo - Scalp  Wash affected areas of scalp 2-3 times weekly as directed    5. Diffuse  photodamage of skin  Photodistributed  Diffuse photodamage with actinic changes with telangiectasia and mottled pigmentation in sun-exposed areas.    Photodamage.  The signs and symptoms of skin cancer were reviewed and the patient was advised to practice sun protection and sun avoidance, use daily sunscreen, and perform regular self skin exams.  Sun protection was discussed, including avoiding the mid-day sun, wearing a sunscreen with SPF at least 50, and stressing the need for reapplication of sunscreen and applying more than they think they need.          Retention Suture Text: Retention sutures were placed to support the closure and prevent dehiscence.

## 2025-02-07 ENCOUNTER — TELEPHONE (OUTPATIENT)
Dept: PRIMARY CARE | Facility: CLINIC | Age: 55
End: 2025-02-07
Payer: COMMERCIAL

## 2025-02-07 NOTE — TELEPHONE ENCOUNTER
Felicia is out of [place].  Please call in to [facility] for a mo worth The co gave him a pin numbe,etc .  They will pay for it because they are late on the order. [phone]

## 2025-02-11 DIAGNOSIS — E11.9 TYPE 2 DIABETES MELLITUS WITHOUT COMPLICATION, WITHOUT LONG-TERM CURRENT USE OF INSULIN (MULTI): ICD-10-CM

## 2025-02-25 DIAGNOSIS — E11.9 TYPE 2 DIABETES MELLITUS WITHOUT COMPLICATION, WITHOUT LONG-TERM CURRENT USE OF INSULIN (MULTI): ICD-10-CM

## 2025-02-26 RX ORDER — SEMAGLUTIDE 1.34 MG/ML
1 INJECTION, SOLUTION SUBCUTANEOUS
Qty: 3 ML | Refills: 0 | Status: SHIPPED | OUTPATIENT
Start: 2025-03-02

## 2025-04-29 DIAGNOSIS — G25.81 RESTLESS LEG: ICD-10-CM

## 2025-04-29 DIAGNOSIS — E11.9 TYPE 2 DIABETES MELLITUS WITHOUT COMPLICATION, WITHOUT LONG-TERM CURRENT USE OF INSULIN: ICD-10-CM

## 2025-05-01 RX ORDER — ROPINIROLE 0.5 MG/1
0.5 TABLET, FILM COATED ORAL NIGHTLY
Qty: 90 TABLET | Refills: 0 | Status: SHIPPED | OUTPATIENT
Start: 2025-05-01

## 2025-05-01 RX ORDER — METFORMIN HYDROCHLORIDE 1000 MG/1
TABLET ORAL
Qty: 100 TABLET | Refills: 0 | Status: SHIPPED | OUTPATIENT
Start: 2025-05-01

## 2025-05-22 ENCOUNTER — APPOINTMENT (OUTPATIENT)
Dept: PRIMARY CARE | Facility: CLINIC | Age: 55
End: 2025-05-22
Payer: COMMERCIAL

## 2025-05-22 VITALS
OXYGEN SATURATION: 98 % | HEART RATE: 81 BPM | WEIGHT: 211 LBS | DIASTOLIC BLOOD PRESSURE: 82 MMHG | BODY MASS INDEX: 39.33 KG/M2 | SYSTOLIC BLOOD PRESSURE: 126 MMHG

## 2025-05-22 DIAGNOSIS — E11.9 TYPE 2 DIABETES MELLITUS WITHOUT COMPLICATION, WITHOUT LONG-TERM CURRENT USE OF INSULIN: ICD-10-CM

## 2025-05-22 DIAGNOSIS — Z00.00 ANNUAL PHYSICAL EXAM: Primary | ICD-10-CM

## 2025-05-22 DIAGNOSIS — F41.1 ANXIETY, GENERALIZED: ICD-10-CM

## 2025-05-22 DIAGNOSIS — G25.81 RESTLESS LEGS SYNDROME (RLS): ICD-10-CM

## 2025-05-22 DIAGNOSIS — F33.41 RECURRENT MAJOR DEPRESSIVE DISORDER, IN PARTIAL REMISSION: ICD-10-CM

## 2025-05-22 PROBLEM — F32.9 MDD (MAJOR DEPRESSIVE DISORDER): Status: RESOLVED | Noted: 2021-02-26 | Resolved: 2025-05-22

## 2025-05-22 LAB — POC HEMOGLOBIN A1C: 7.8 % (ref 4.2–6.5)

## 2025-05-22 PROCEDURE — 3051F HG A1C>EQUAL 7.0%<8.0%: CPT

## 2025-05-22 PROCEDURE — 1036F TOBACCO NON-USER: CPT

## 2025-05-22 PROCEDURE — 3074F SYST BP LT 130 MM HG: CPT

## 2025-05-22 PROCEDURE — 83036 HEMOGLOBIN GLYCOSYLATED A1C: CPT

## 2025-05-22 PROCEDURE — 3079F DIAST BP 80-89 MM HG: CPT

## 2025-05-22 PROCEDURE — 99396 PREV VISIT EST AGE 40-64: CPT

## 2025-05-22 RX ORDER — METFORMIN HYDROCHLORIDE 500 MG/1
500 TABLET ORAL 3 TIMES DAILY
Qty: 270 TABLET | Refills: 3 | Status: SHIPPED | OUTPATIENT
Start: 2025-05-22 | End: 2026-05-17

## 2025-05-22 ASSESSMENT — PATIENT HEALTH QUESTIONNAIRE - PHQ9
1. LITTLE INTEREST OR PLEASURE IN DOING THINGS: NOT AT ALL
SUM OF ALL RESPONSES TO PHQ9 QUESTIONS 1 AND 2: 0
2. FEELING DOWN, DEPRESSED OR HOPELESS: NOT AT ALL

## 2025-05-22 NOTE — PATIENT INSTRUCTIONS
[facility] 's   (Breast Cancer and Cervical Cancer Prevention )  phone number  is [phone].     This is a program who will cover the costs of breast and pelvic exams, pap smears, and mammograms, as well as the follow up to any abnormalities found with those tests.      They  help women who are uninsured or under insured.     If you haven't yet,  please give them a call to see if you qualify for the program.    If you do,  they will have paperwork for you to fill out and send back.     If you have qualified for the program,  they will have to set up all appointments for you.     If you are [place], the [facility] ( an [facility]) sponsors periodic clinics with [facility].  You can ask the [facility] people if you could have an appointment there  OR  in the office with me.       If you are non-[place], your visit will only be here in the office.      If you already have your order for a mammogram,  be sure to contact them to set up the mammogram appointment.     Be sure to take your mammogram order with you to your appointment, along with any [facility] paperwork you may have.     If you have already had your testing, you will always hear about your results.  So if 3 weeks go by, and you have not heard anything, please let either myself or the people at [facility] know.     And,  please tell any of your friends who may not have insurance about this fantastic program!

## 2025-05-22 NOTE — PROGRESS NOTES
Subjective   Patient ID: Razia Gunn is a 55 y.o. female who presents for Hyperlipidemia, Diabetes, and Anxiety.  HPI  Razia presents for check up     Diabetes  -last hgb A1c was 10.1%  -Ozempic 1mg through patient assistance, takes it Tuesday, feet and hands are tingling  -Also saw dermatology who took a biopsy of a rash in the vaginal area, was thought that the rash was possibly a lichenoid drug eruption 2/2 to dietary supplement she was taking but Razia thinks it may be related to ozempic   -She does not want to stop taking ozempic    Anxiety   -This is fine, follows with psychiatry   -Escitalopram and buspar     Restless leg  -Ropinirole  -Sleep 3 supplement   -Used to get hot flashes on hydroxyzine       Health Maintenance Reminder:  -Blood Work: declines   -Hep C:   -Colonoscopy: declines  -Mammo: [facility]CCP info given   -Dexa >65y:  -Pap: s/p hysterectomy 2017  -Lung CA Screen: 50-80  -Shingrix >50:  -Pneumovax >65y, <65 if at risk, 5y between <65 and >65 dose:  -Flu: Yearly  -tdap:       Surgical History[1]   Medical History[2]  Social History[3]     Review of Systems  10 point review of systems performed and is negative except as noted in the HPI.    Current Medications[4]     Objective   /82   Pulse 81   Wt 95.7 kg (211 lb)   SpO2 98%   BMI 39.33 kg/m²     Physical Exam  Constitutional:       Appearance: Normal appearance.   HENT:      Head: Normocephalic and atraumatic.      Right Ear: Tympanic membrane, ear canal and external ear normal.      Left Ear: Tympanic membrane, ear canal and external ear normal.      Nose: Nose normal.      Mouth/Throat:      Mouth: Mucous membranes are moist.      Pharynx: Oropharynx is clear.   Eyes:      Conjunctiva/sclera: Conjunctivae normal.      Pupils: Pupils are equal, round, and reactive to light.   Cardiovascular:      Rate and Rhythm: Normal rate and regular rhythm.      Heart sounds: Normal heart sounds. No murmur heard.  Pulmonary:      Effort:  Pulmonary effort is normal.      Breath sounds: Normal breath sounds. No wheezing, rhonchi or rales.   Abdominal:      General: Bowel sounds are normal.      Palpations: Abdomen is soft.      Tenderness: There is no abdominal tenderness.   Musculoskeletal:         General: Normal range of motion.      Right lower leg: No edema.      Left lower leg: No edema.   Skin:     General: Skin is warm and dry.      Findings: No rash.   Neurological:      Mental Status: She is alert and oriented to person, place, and time.   Psychiatric:         Mood and Affect: Mood normal.         Behavior: Behavior normal.         Assessment & Plan  Type 2 diabetes mellitus without complication, without long-term current use of insulin  Hgb A1c today is 7.8% which is great improvement from 10.1% at last visit (10/31/24)  Doing well on ozempic 1mg, did discuss the side effects she thinks she is experiencing but that they are manageable and she does not want to stop taking it   She was taking 1000mg metformin 3 times a day, not sure why or who told her to do this, but discussed that is too much daily  Will switch her to 500mg metforming 3 times a day   Discussed doing labs today, declines     Diabetes   -Last hgb A1c: 10.1% (10/31/24)  -Last labs: renal function panel 9/29/24; microalbumin - 4/5/25 wnl  -Last DM eye exam: due  -Statin:   -Foot exam:     Orders:    POCT glycosylated hemoglobin (Hb A1C) manually resulted    metFORMIN (Glucophage) 500 mg tablet; Take 1 tablet (500 mg) by mouth 3 times a day.    Recurrent major depressive disorder, in partial remission  Anxiety, generalized  Follows with psychiatry - escitalopram 20mg, buspar 15mg tid          Restless legs syndrome (RLS)  Doing well on ropinirole        Annual physical exam               Discussed at visit any disease processes that were of concern as well as the risks, benefits and instructions on any new medication provided. Patient (and/or caretaker of patient if present)  stated all questions were answered, and they voiced understanding of instructions.      Mary Mena PA-C       [1]   Past Surgical History:  Procedure Laterality Date    CHOLECYSTECTOMY  05/28/2013    Cholecystectomy    TOTAL KNEE ARTHROPLASTY Right     TOTAL VAGINAL HYSTERECTOMY  01/23/2017    Vaginal Hysterectomy    TUBAL LIGATION  05/28/2013    Tubal Ligation   [2]   Past Medical History:  Diagnosis Date    Acute candidiasis of vulva and vagina 01/20/2015    Yeast vaginitis    Acute laryngitis 12/18/2014    Acute laryngitis    Acute maxillary sinusitis, unspecified 03/08/2020    Acute maxillary sinusitis    Candidiasis, unspecified 12/20/2017    Yeast infection    Depression     Diabetes mellitus (Multi)     Other conditions influencing health status     History of burning on urination    Other conditions influencing health status 09/30/2016    Cyst    Other conditions influencing health status 03/06/2014    History of dyspareunia    Other displaced fracture of base of first metacarpal bone, left hand, initial encounter for closed fracture 07/22/2021    Closed displaced fracture of base of first metacarpal bone of left hand, unspecified fracture morphology, initial encounter    Pelvic and perineal pain 01/23/2017    Pelvic pain in female    Personal history of other benign neoplasm 10/03/2016    History of uterine leiomyoma    Personal history of other diseases of the circulatory system 01/23/2017    History of hypertension    Personal history of other diseases of the musculoskeletal system and connective tissue 12/08/2015    History of low back pain    Personal history of other diseases of the respiratory system 02/24/2020    History of influenza    Personal history of other diseases of the respiratory system 09/26/2018    History of acute bronchitis    Personal history of other diseases of the respiratory system 02/15/2014    Personal history of acute sinusitis    Personal history of other diseases of the  respiratory system 04/22/2019    History of acute bronchitis    Personal history of other diseases of the respiratory system 11/21/2017    History of acute sinusitis    Personal history of other diseases of urinary system 08/22/2017    History of cystocele    Personal history of other endocrine, nutritional and metabolic disease 10/16/2014    History of type 2 diabetes mellitus    Personal history of other mental and behavioral disorders 05/28/2013    History of depression    Personal history of other specified conditions 03/25/2014    History of chest pain    Personal history of other specified conditions 08/22/2017    History of dysuria    Personal history of other specified conditions 03/19/2015    History of diarrhea    Personal history of other specified conditions 10/10/2014    History of abdominal pain    Phlebitis and thrombophlebitis of other sites 03/20/2020    Thrombophlebitis arm    Phlebitis and thrombophlebitis of other sites 03/20/2020    Phlebitis of arm    Right lower quadrant pain 11/01/2013    Abdominal pain, RLQ (right lower quadrant)    Unspecified condition associated with female genital organs and menstrual cycle 12/21/2015    Adnexal cyst    Unspecified symptoms and signs involving the genitourinary system 10/17/2016    UTI symptoms   [3]   Social History  Tobacco Use    Smoking status: Never    Smokeless tobacco: Never   Vaping Use    Vaping status: Never Used   Substance Use Topics    Alcohol use: Never    Drug use: Never   [4]   Current Outpatient Medications:     busPIRone (Buspar) 15 mg tablet, Take 1 tablet (15 mg) by mouth 3 times a day., Disp: , Rfl:     CHOLECALCIFEROL, VITAMIN D3, ORAL, Take by mouth once daily., Disp: , Rfl:     CRANBERRY ORAL, Take 1 tablet by mouth once daily., Disp: , Rfl:     CYANOCOBALAMIN, VITAMIN B-12, ORAL, Place 1 tablet under the tongue once daily., Disp: , Rfl:     escitalopram (Lexapro) 20 mg tablet, Take 1 tablet (20 mg) by mouth once daily., Disp: ,  Rfl:     Ozempic 1 mg/dose (4 mg/3 mL) pen injector, INJECT 1MG SUBCUTANEOUSLY ONCE WEEKLY, Disp: 3 mL, Rfl: 0    rOPINIRole (Requip) 0.5 mg tablet, TAKE 1 TABLET BY MOUTH ONCE DAILY AT BEDTIME, Disp: 90 tablet, Rfl: 0    acetaminophen (Tylenol) 325 mg tablet, Take 1 tablet (325 mg) by mouth every 6 hours if needed for mild pain (1 - 3). (Patient not taking: Reported on 5/22/2025), Disp: , Rfl:     albuterol (Ventolin HFA) 90 mcg/actuation inhaler, Inhale 2 puffs every 4 hours if needed for wheezing or shortness of breath. (Patient not taking: Reported on 5/22/2025), Disp: 8 g, Rfl: 5    clobetasol (Temovate) 0.05 % ointment, Apply topically 2 times a day. (Patient not taking: Reported on 5/22/2025), Disp: 30 g, Rfl: 1    hydrOXYzine pamoate (Vistaril) 50 mg capsule, Take 1 capsule (50 mg) by mouth once daily at bedtime. (Patient not taking: Reported on 5/22/2025), Disp: , Rfl:     ketoconazole (NIZOral) 2 % shampoo, Wash affected areas of scalp 2-3 times weekly as directed (Patient not taking: Reported on 5/22/2025), Disp: 120 mL, Rfl: 11    metFORMIN (Glucophage) 500 mg tablet, Take 1 tablet (500 mg) by mouth 3 times a day., Disp: 270 tablet, Rfl: 3    semaglutide (Ozempic) 1 mg/dose (2 mg/1.5 mL) pen injector, Inject 1 mg under the skin 1 (one) time per week. (Patient not taking: Reported on 5/22/2025), Disp: 1 each, Rfl: 0    triamcinolone (Kenalog) 0.1 % ointment, Apply twice daily to affected areas of body (avoid face, groin, body folds) for 2 weeks (Patient not taking: Reported on 5/22/2025), Disp: 80 g, Rfl: 0

## 2025-05-22 NOTE — ASSESSMENT & PLAN NOTE
Hgb A1c today is 7.8% which is great improvement from 10.1% at last visit (10/31/24)  Doing well on ozempic 1mg, did discuss the side effects she thinks she is experiencing but that they are manageable and she does not want to stop taking it   She was taking 1000mg metformin 3 times a day, not sure why or who told her to do this, but discussed that is too much daily  Will switch her to 500mg metforming 3 times a day   Discussed doing labs today, declines     Diabetes   -Last hgb A1c: 10.1% (10/31/24)  -Last labs: renal function panel 9/29/24; microalbumin - 4/5/25 wnl  -Last DM eye exam: due  -Statin:   -Foot exam:     Orders:    POCT glycosylated hemoglobin (Hb A1C) manually resulted    metFORMIN (Glucophage) 500 mg tablet; Take 1 tablet (500 mg) by mouth 3 times a day.

## 2025-06-30 ENCOUNTER — APPOINTMENT (OUTPATIENT)
Dept: CARDIOLOGY | Facility: HOSPITAL | Age: 55
End: 2025-06-30
Payer: COMMERCIAL

## 2025-06-30 ENCOUNTER — APPOINTMENT (OUTPATIENT)
Dept: RADIOLOGY | Facility: HOSPITAL | Age: 55
End: 2025-06-30
Payer: COMMERCIAL

## 2025-06-30 ENCOUNTER — APPOINTMENT (OUTPATIENT)
Dept: DERMATOLOGY | Facility: CLINIC | Age: 55
End: 2025-06-30
Payer: COMMERCIAL

## 2025-06-30 ENCOUNTER — HOSPITAL ENCOUNTER (EMERGENCY)
Facility: HOSPITAL | Age: 55
Discharge: HOME | End: 2025-06-30
Attending: EMERGENCY MEDICINE
Payer: COMMERCIAL

## 2025-06-30 VITALS
HEIGHT: 63 IN | BODY MASS INDEX: 34.73 KG/M2 | RESPIRATION RATE: 18 BRPM | DIASTOLIC BLOOD PRESSURE: 89 MMHG | SYSTOLIC BLOOD PRESSURE: 149 MMHG | HEART RATE: 81 BPM | TEMPERATURE: 98.2 F | OXYGEN SATURATION: 100 % | WEIGHT: 196 LBS

## 2025-06-30 DIAGNOSIS — R10.9 ABDOMINAL PAIN, UNSPECIFIED ABDOMINAL LOCATION: ICD-10-CM

## 2025-06-30 DIAGNOSIS — R07.9 CHEST PAIN, UNSPECIFIED TYPE: Primary | ICD-10-CM

## 2025-06-30 LAB
ALBUMIN SERPL BCP-MCNC: 4.4 G/DL (ref 3.4–5)
ALP SERPL-CCNC: 60 U/L (ref 33–110)
ALT SERPL W P-5'-P-CCNC: 17 U/L (ref 7–45)
ANION GAP SERPL CALC-SCNC: 14 MMOL/L (ref 10–20)
APPEARANCE UR: CLEAR
AST SERPL W P-5'-P-CCNC: 19 U/L (ref 9–39)
BASOPHILS # BLD AUTO: 0.01 X10*3/UL (ref 0–0.1)
BASOPHILS NFR BLD AUTO: 0.1 %
BILIRUB SERPL-MCNC: 0.4 MG/DL (ref 0–1.2)
BILIRUB UR STRIP.AUTO-MCNC: NEGATIVE MG/DL
BUN SERPL-MCNC: 15 MG/DL (ref 6–23)
CALCIUM SERPL-MCNC: 9.3 MG/DL (ref 8.6–10.3)
CARDIAC TROPONIN I PNL SERPL HS: 4 NG/L (ref 0–13)
CARDIAC TROPONIN I PNL SERPL HS: 4 NG/L (ref 0–13)
CHLORIDE SERPL-SCNC: 106 MMOL/L (ref 98–107)
CO2 SERPL-SCNC: 25 MMOL/L (ref 21–32)
COLOR UR: ABNORMAL
CREAT SERPL-MCNC: 0.95 MG/DL (ref 0.5–1.05)
EGFRCR SERPLBLD CKD-EPI 2021: 71 ML/MIN/1.73M*2
EOSINOPHIL # BLD AUTO: 0.08 X10*3/UL (ref 0–0.7)
EOSINOPHIL NFR BLD AUTO: 1.2 %
ERYTHROCYTE [DISTWIDTH] IN BLOOD BY AUTOMATED COUNT: 11.8 % (ref 11.5–14.5)
GLUCOSE SERPL-MCNC: 132 MG/DL (ref 74–99)
GLUCOSE UR STRIP.AUTO-MCNC: NORMAL MG/DL
HCT VFR BLD AUTO: 43 % (ref 36–46)
HGB BLD-MCNC: 14.8 G/DL (ref 12–16)
IMM GRANULOCYTES # BLD AUTO: 0.01 X10*3/UL (ref 0–0.7)
IMM GRANULOCYTES NFR BLD AUTO: 0.1 % (ref 0–0.9)
KETONES UR STRIP.AUTO-MCNC: NEGATIVE MG/DL
LEUKOCYTE ESTERASE UR QL STRIP.AUTO: ABNORMAL
LYMPHOCYTES # BLD AUTO: 2.25 X10*3/UL (ref 1.2–4.8)
LYMPHOCYTES NFR BLD AUTO: 33.7 %
MAGNESIUM SERPL-MCNC: 1.29 MG/DL (ref 1.6–2.4)
MCH RBC QN AUTO: 31.1 PG (ref 26–34)
MCHC RBC AUTO-ENTMCNC: 34.4 G/DL (ref 32–36)
MCV RBC AUTO: 90 FL (ref 80–100)
MONOCYTES # BLD AUTO: 0.25 X10*3/UL (ref 0.1–1)
MONOCYTES NFR BLD AUTO: 3.7 %
MUCOUS THREADS #/AREA URNS AUTO: NORMAL /LPF
NEUTROPHILS # BLD AUTO: 4.07 X10*3/UL (ref 1.2–7.7)
NEUTROPHILS NFR BLD AUTO: 61.2 %
NITRITE UR QL STRIP.AUTO: NEGATIVE
NRBC BLD-RTO: 0 /100 WBCS (ref 0–0)
PH UR STRIP.AUTO: 5.5 [PH]
PLATELET # BLD AUTO: 246 X10*3/UL (ref 150–450)
POTASSIUM SERPL-SCNC: 3.8 MMOL/L (ref 3.5–5.3)
PROT SERPL-MCNC: 6.7 G/DL (ref 6.4–8.2)
PROT UR STRIP.AUTO-MCNC: ABNORMAL MG/DL
RBC # BLD AUTO: 4.76 X10*6/UL (ref 4–5.2)
RBC # UR STRIP.AUTO: NEGATIVE MG/DL
RBC #/AREA URNS AUTO: NORMAL /HPF
SODIUM SERPL-SCNC: 141 MMOL/L (ref 136–145)
SP GR UR STRIP.AUTO: 1.03
SQUAMOUS #/AREA URNS AUTO: NORMAL /HPF
TSH SERPL-ACNC: 1.09 MIU/L (ref 0.44–3.98)
UROBILINOGEN UR STRIP.AUTO-MCNC: NORMAL MG/DL
WBC # BLD AUTO: 6.7 X10*3/UL (ref 4.4–11.3)
WBC #/AREA URNS AUTO: NORMAL /HPF

## 2025-06-30 PROCEDURE — 74177 CT ABD & PELVIS W/CONTRAST: CPT

## 2025-06-30 PROCEDURE — 2550000001 HC RX 255 CONTRASTS: Performed by: EMERGENCY MEDICINE

## 2025-06-30 PROCEDURE — 36415 COLL VENOUS BLD VENIPUNCTURE: CPT | Performed by: EMERGENCY MEDICINE

## 2025-06-30 PROCEDURE — 93005 ELECTROCARDIOGRAM TRACING: CPT

## 2025-06-30 PROCEDURE — 71275 CT ANGIOGRAPHY CHEST: CPT | Mod: FOREIGN READ | Performed by: RADIOLOGY

## 2025-06-30 PROCEDURE — 99285 EMERGENCY DEPT VISIT HI MDM: CPT | Mod: 25 | Performed by: EMERGENCY MEDICINE

## 2025-06-30 PROCEDURE — 87086 URINE CULTURE/COLONY COUNT: CPT | Mod: GEALAB | Performed by: EMERGENCY MEDICINE

## 2025-06-30 PROCEDURE — 84484 ASSAY OF TROPONIN QUANT: CPT | Performed by: EMERGENCY MEDICINE

## 2025-06-30 PROCEDURE — 74177 CT ABD & PELVIS W/CONTRAST: CPT | Mod: FOREIGN READ | Performed by: RADIOLOGY

## 2025-06-30 PROCEDURE — 71275 CT ANGIOGRAPHY CHEST: CPT

## 2025-06-30 PROCEDURE — 84443 ASSAY THYROID STIM HORMONE: CPT | Performed by: EMERGENCY MEDICINE

## 2025-06-30 PROCEDURE — 85025 COMPLETE CBC W/AUTO DIFF WBC: CPT | Performed by: EMERGENCY MEDICINE

## 2025-06-30 PROCEDURE — 80053 COMPREHEN METABOLIC PANEL: CPT | Performed by: EMERGENCY MEDICINE

## 2025-06-30 PROCEDURE — 96374 THER/PROPH/DIAG INJ IV PUSH: CPT | Mod: 59

## 2025-06-30 PROCEDURE — 83735 ASSAY OF MAGNESIUM: CPT | Performed by: EMERGENCY MEDICINE

## 2025-06-30 PROCEDURE — 2500000004 HC RX 250 GENERAL PHARMACY W/ HCPCS (ALT 636 FOR OP/ED): Performed by: EMERGENCY MEDICINE

## 2025-06-30 PROCEDURE — 96361 HYDRATE IV INFUSION ADD-ON: CPT

## 2025-06-30 PROCEDURE — 81001 URINALYSIS AUTO W/SCOPE: CPT | Performed by: EMERGENCY MEDICINE

## 2025-06-30 RX ORDER — ONDANSETRON HYDROCHLORIDE 2 MG/ML
4 INJECTION, SOLUTION INTRAVENOUS ONCE
Status: COMPLETED | OUTPATIENT
Start: 2025-06-30 | End: 2025-06-30

## 2025-06-30 RX ADMIN — SODIUM CHLORIDE 1000 ML: 0.9 INJECTION, SOLUTION INTRAVENOUS at 17:20

## 2025-06-30 RX ADMIN — IOHEXOL 75 ML: 350 INJECTION, SOLUTION INTRAVENOUS at 18:32

## 2025-06-30 RX ADMIN — ONDANSETRON 4 MG: 2 INJECTION, SOLUTION INTRAMUSCULAR; INTRAVENOUS at 17:20

## 2025-06-30 ASSESSMENT — HEART SCORE
HISTORY: SLIGHTLY SUSPICIOUS
TROPONIN: LESS THAN OR EQUAL TO NORMAL LIMIT
ECG: NORMAL
RISK FACTORS: 1-2 RISK FACTORS
AGE: 45-64
HEART SCORE: 2

## 2025-06-30 ASSESSMENT — PAIN SCALES - GENERAL
PAINLEVEL_OUTOF10: 4
PAINLEVEL_OUTOF10: 6

## 2025-06-30 ASSESSMENT — PAIN - FUNCTIONAL ASSESSMENT: PAIN_FUNCTIONAL_ASSESSMENT: 0-10

## 2025-06-30 NOTE — ED PROVIDER NOTES
HPI   Chief Complaint   Patient presents with    Hypertension       Razia is a 55-year-old woman who presents with multiple complaints.  She reports that she started feeling bad last night and her her blood pressure was markedly elevated.  She felt tired and has had a headache on and off and not feeling right.  She believes this started a few days ago when she was mowing the lawn and got tired and laid down in the field.  She has a history of diabetes and says she feels bad.  She believes she might be dehydrated.  She is also concerned about elevated blood pressure.  She felt like she might have an infection 3 weeks ago and took a course antibiotics but that did not make her better than she tried allergy medicines.  History comes from patient and EMR.              Patient History   Medical History[1]  Surgical History[2]  Family History[3]  Social History[4]    Physical Exam   ED Triage Vitals [06/30/25 1642]   Temperature Heart Rate Respirations BP   36.8 °C (98.2 °F) 96 18 (!) 174/101      Pulse Ox Temp Source Heart Rate Source Patient Position   97 % Temporal Monitor Sitting      BP Location FiO2 (%)     Left arm --       Physical Exam  Vitals reviewed.   Constitutional:       General: She is awake.      Appearance: Normal appearance.   HENT:      Head: Normocephalic.      Nose: Nose normal.      Mouth/Throat:      Comments: Slightly dry  Cardiovascular:      Rate and Rhythm: Regular rhythm. Tachycardia present.   Pulmonary:      Effort: Pulmonary effort is normal.      Breath sounds: Normal breath sounds.   Abdominal:      Palpations: Abdomen is soft.   Musculoskeletal:      Cervical back: Normal range of motion.   Skin:     General: Skin is warm.      Capillary Refill: Capillary refill takes less than 2 seconds.   Neurological:      Mental Status: She is alert.           ED Course & MDM   ED Course as of 06/30/25 2130 Mon Jun 30, 2025   1714 EKG  done at 1709 interpreted by me shows normal sinus rhythm 83  bpm no STEMI no significant change versus old EKG approximately 1 year ago.  Previously EKG was tachycardic [RZ]   1718 Patient presents with multiple complaints namely she is concerned that she does not feel well she has chest pain she feels her heart racing abdominal discomfort she feels short of breath [RZ]   1718 Plan is to workup with CT scan to rule out PE also consider cardiac etiology.  May have abdominal process considering infectious less likely.  Patient does appear slightly dehydrated will give some fluids and nausea medication.  Awaiting results. [RZ]   1815 Second EKG done at 1810 interpreted by me shows normal sinus rhythm 78 beats minute no ischemia no STEMI similar to previous [RZ]   1846 I updated the patient as to the results that are back we are awaiting his CT images.  Not finding signs of marked dehydration or electro abnormalities or cardiac etiology. [RZ]   2130 patient CT scan did not show cause of her symptoms I talked to her about the findings and patient we discharged home [RZ]      ED Course User Index  [RZ] Rashi Beck MD         Diagnoses as of 06/30/25 2130   Chest pain, unspecified type   Abdominal pain, unspecified abdominal location                 No data recorded     [place] Coma Scale Score: 15 (06/30/25 1641 : Luis Alberto Corona RN) HEART Score: 2 (06/30/25 1731 : Rashi Beck MD)                         Medical Decision Making      Procedure  Procedures       [1]   Past Medical History:  Diagnosis Date    Acute candidiasis of vulva and vagina 01/20/2015    Yeast vaginitis    Acute laryngitis 12/18/2014    Acute laryngitis    Acute maxillary sinusitis, unspecified 03/08/2020    Acute maxillary sinusitis    Candidiasis, unspecified 12/20/2017    Yeast infection    Depression     Diabetes mellitus (Multi)     Other conditions influencing health status     History of burning on urination    Other conditions influencing health status 09/30/2016    Cyst    Other  conditions influencing health status 03/06/2014    History of dyspareunia    Other displaced fracture of base of first metacarpal bone, left hand, initial encounter for closed fracture 07/22/2021    Closed displaced fracture of base of first metacarpal bone of left hand, unspecified fracture morphology, initial encounter    Pelvic and perineal pain 01/23/2017    Pelvic pain in female    Personal history of other benign neoplasm 10/03/2016    History of uterine leiomyoma    Personal history of other diseases of the circulatory system 01/23/2017    History of hypertension    Personal history of other diseases of the musculoskeletal system and connective tissue 12/08/2015    History of low back pain    Personal history of other diseases of the respiratory system 02/24/2020    History of influenza    Personal history of other diseases of the respiratory system 09/26/2018    History of acute bronchitis    Personal history of other diseases of the respiratory system 02/15/2014    Personal history of acute sinusitis    Personal history of other diseases of the respiratory system 04/22/2019    History of acute bronchitis    Personal history of other diseases of the respiratory system 11/21/2017    History of acute sinusitis    Personal history of other diseases of urinary system 08/22/2017    History of cystocele    Personal history of other endocrine, nutritional and metabolic disease 10/16/2014    History of type 2 diabetes mellitus    Personal history of other mental and behavioral disorders 05/28/2013    History of depression    Personal history of other specified conditions 03/25/2014    History of chest pain    Personal history of other specified conditions 08/22/2017    History of dysuria    Personal history of other specified conditions 03/19/2015    History of diarrhea    Personal history of other specified conditions 10/10/2014    History of abdominal pain    Phlebitis and thrombophlebitis of other sites  03/20/2020    Thrombophlebitis arm    Phlebitis and thrombophlebitis of other sites 03/20/2020    Phlebitis of arm    Right lower quadrant pain 11/01/2013    Abdominal pain, RLQ (right lower quadrant)    Unspecified condition associated with female genital organs and menstrual cycle 12/21/2015    Adnexal cyst    Unspecified symptoms and signs involving the genitourinary system 10/17/2016    UTI symptoms   [2]   Past Surgical History:  Procedure Laterality Date    CHOLECYSTECTOMY  05/28/2013    Cholecystectomy    TOTAL KNEE ARTHROPLASTY Right     TOTAL VAGINAL HYSTERECTOMY  01/23/2017    Vaginal Hysterectomy    TUBAL LIGATION  05/28/2013    Tubal Ligation   [3] No family history on file.  [4]   Social History  Tobacco Use    Smoking status: Never    Smokeless tobacco: Never   Vaping Use    Vaping status: Never Used   Substance Use Topics    Alcohol use: Never    Drug use: Never        Rashi Beck MD  06/30/25 1827

## 2025-06-30 NOTE — ED TRIAGE NOTES
Pt BIB POV from home w/ c/o /100 and heart feels like it's going fast, nausea, fatigue and heartburn for a week, poor appetite, diarrhea, HA. Ambulatory, speaking in full sentences. Oriented.

## 2025-07-01 LAB — HOLD SPECIMEN: NORMAL

## 2025-07-02 LAB
ATRIAL RATE: 78 BPM
ATRIAL RATE: 83 BPM
BACTERIA UR CULT: NORMAL
P AXIS: 46 DEGREES
P AXIS: 46 DEGREES
P OFFSET: 190 MS
P OFFSET: 194 MS
P ONSET: 143 MS
P ONSET: 146 MS
PR INTERVAL: 144 MS
PR INTERVAL: 150 MS
Q ONSET: 218 MS
Q ONSET: 218 MS
QRS COUNT: 12 BEATS
QRS COUNT: 13 BEATS
QRS DURATION: 84 MS
QRS DURATION: 86 MS
QT INTERVAL: 380 MS
QT INTERVAL: 396 MS
QTC CALCULATION(BAZETT): 446 MS
QTC CALCULATION(BAZETT): 451 MS
QTC FREDERICIA: 423 MS
QTC FREDERICIA: 432 MS
R AXIS: 37 DEGREES
R AXIS: 38 DEGREES
T AXIS: 34 DEGREES
T AXIS: 37 DEGREES
T OFFSET: 408 MS
T OFFSET: 416 MS
VENTRICULAR RATE: 78 BPM
VENTRICULAR RATE: 83 BPM

## 2025-07-19 LAB
ATRIAL RATE: 78 BPM
ATRIAL RATE: 83 BPM
P AXIS: 46 DEGREES
P AXIS: 46 DEGREES
P OFFSET: 190 MS
P OFFSET: 194 MS
P ONSET: 143 MS
P ONSET: 146 MS
PR INTERVAL: 144 MS
PR INTERVAL: 150 MS
Q ONSET: 218 MS
Q ONSET: 218 MS
QRS COUNT: 12 BEATS
QRS COUNT: 13 BEATS
QRS DURATION: 84 MS
QRS DURATION: 86 MS
QT INTERVAL: 380 MS
QT INTERVAL: 396 MS
QTC CALCULATION(BAZETT): 446 MS
QTC CALCULATION(BAZETT): 451 MS
QTC FREDERICIA: 423 MS
QTC FREDERICIA: 432 MS
R AXIS: 37 DEGREES
R AXIS: 38 DEGREES
T AXIS: 34 DEGREES
T AXIS: 37 DEGREES
T OFFSET: 408 MS
T OFFSET: 416 MS
VENTRICULAR RATE: 78 BPM
VENTRICULAR RATE: 83 BPM

## 2025-08-04 ENCOUNTER — APPOINTMENT (OUTPATIENT)
Dept: RADIOLOGY | Facility: HOSPITAL | Age: 55
End: 2025-08-04
Payer: COMMERCIAL

## 2025-08-04 ENCOUNTER — APPOINTMENT (OUTPATIENT)
Dept: CARDIOLOGY | Facility: HOSPITAL | Age: 55
End: 2025-08-04
Payer: COMMERCIAL

## 2025-08-04 ENCOUNTER — HOSPITAL ENCOUNTER (EMERGENCY)
Facility: HOSPITAL | Age: 55
Discharge: HOME | End: 2025-08-04
Attending: EMERGENCY MEDICINE
Payer: COMMERCIAL

## 2025-08-04 VITALS
TEMPERATURE: 96.8 F | WEIGHT: 196 LBS | OXYGEN SATURATION: 100 % | BODY MASS INDEX: 34.73 KG/M2 | SYSTOLIC BLOOD PRESSURE: 169 MMHG | HEART RATE: 107 BPM | DIASTOLIC BLOOD PRESSURE: 90 MMHG | RESPIRATION RATE: 18 BRPM | HEIGHT: 63 IN
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
## 2025-08-04 LAB
ALBUMIN SERPL BCP-MCNC: 3.9 G/DL (ref 3.4–5)
ALP SERPL-CCNC: 56 U/L (ref 33–110)
ALT SERPL W P-5'-P-CCNC: 17 U/L (ref 7–45)
ANION GAP BLDV CALCULATED.4IONS-SCNC: 12 MMOL/L (ref 10–25)
ANION GAP SERPL CALC-SCNC: 14 MMOL/L (ref 10–20)
AST SERPL W P-5'-P-CCNC: 14 U/L (ref 9–39)
ATRIAL RATE: 93 BPM
BASE EXCESS BLDV CALC-SCNC: -1.2 MMOL/L (ref -2–3)
BASOPHILS # BLD AUTO: 0.01 X10*3/UL (ref 0–0.1)
BASOPHILS NFR BLD AUTO: 0.2 %
BILIRUB SERPL-MCNC: 0.5 MG/DL (ref 0–1.2)
BODY TEMPERATURE: ABNORMAL
BUN SERPL-MCNC: 13 MG/DL (ref 6–23)
CA-I BLDV-SCNC: 1.21 MMOL/L (ref 1.1–1.33)
CALCIUM SERPL-MCNC: 8.8 MG/DL (ref 8.6–10.3)
CARDIAC TROPONIN I PNL SERPL HS: 3 NG/L (ref 0–13)
CHLORIDE BLDV-SCNC: 102 MMOL/L (ref 98–107)
CHLORIDE SERPL-SCNC: 103 MMOL/L (ref 98–107)
CO2 SERPL-SCNC: 26 MMOL/L (ref 21–32)
CREAT SERPL-MCNC: 0.85 MG/DL (ref 0.5–1.05)
EGFRCR SERPLBLD CKD-EPI 2021: 82 ML/MIN/1.73M*2
EOSINOPHIL # BLD AUTO: 0.07 X10*3/UL (ref 0–0.7)
EOSINOPHIL NFR BLD AUTO: 1.3 %
ERYTHROCYTE [DISTWIDTH] IN BLOOD BY AUTOMATED COUNT: 11.9 % (ref 11.5–14.5)
GLUCOSE BLDV-MCNC: 259 MG/DL (ref 74–99)
GLUCOSE SERPL-MCNC: 235 MG/DL (ref 74–99)
HCO3 BLDV-SCNC: 24.8 MMOL/L (ref 22–26)
HCT VFR BLD AUTO: 40.6 % (ref 36–46)
HCT VFR BLD EST: 47 % (ref 36–46)
HGB BLD-MCNC: 13.9 G/DL (ref 12–16)
HGB BLDV-MCNC: 15.6 G/DL (ref 12–16)
IMM GRANULOCYTES # BLD AUTO: 0.06 X10*3/UL (ref 0–0.7)
IMM GRANULOCYTES NFR BLD AUTO: 1.1 % (ref 0–0.9)
INHALED O2 CONCENTRATION: 21 %
INR PPP: 1 (ref 0.9–1.1)
LACTATE BLDV-SCNC: 3 MMOL/L (ref 0.4–2)
LYMPHOCYTES # BLD AUTO: 1.21 X10*3/UL (ref 1.2–4.8)
LYMPHOCYTES NFR BLD AUTO: 22.8 %
MAGNESIUM SERPL-MCNC: 1.29 MG/DL (ref 1.6–2.4)
MCH RBC QN AUTO: 31.5 PG (ref 26–34)
MCHC RBC AUTO-ENTMCNC: 34.2 G/DL (ref 32–36)
MCV RBC AUTO: 92 FL (ref 80–100)
MONOCYTES # BLD AUTO: 0.22 X10*3/UL (ref 0.1–1)
MONOCYTES NFR BLD AUTO: 4.2 %
NEUTROPHILS # BLD AUTO: 3.73 X10*3/UL (ref 1.2–7.7)
NEUTROPHILS NFR BLD AUTO: 70.4 %
NRBC BLD-RTO: 0 /100 WBCS (ref 0–0)
OXYHGB MFR BLDV: 66.6 % (ref 45–75)
P AXIS: 37 DEGREES
P OFFSET: 194 MS
P ONSET: 142 MS
PCO2 BLDV: 45 MM HG (ref 41–51)
PH BLDV: 7.35 PH (ref 7.33–7.43)
PLATELET # BLD AUTO: 210 X10*3/UL (ref 150–450)
PO2 BLDV: 40 MM HG (ref 35–45)
POTASSIUM BLDV-SCNC: 3.6 MMOL/L (ref 3.5–5.3)
POTASSIUM SERPL-SCNC: 3.5 MMOL/L (ref 3.5–5.3)
PR INTERVAL: 150 MS
PROT SERPL-MCNC: 6.2 G/DL (ref 6.4–8.2)
PROTHROMBIN TIME: 11 SECONDS (ref 9.8–12.4)
Q ONSET: 217 MS
QRS COUNT: 16 BEATS
QRS DURATION: 84 MS
QT INTERVAL: 368 MS
QTC CALCULATION(BAZETT): 457 MS
QTC FREDERICIA: 426 MS
R AXIS: 44 DEGREES
RBC # BLD AUTO: 4.41 X10*6/UL (ref 4–5.2)
SAO2 % BLDV: 68 % (ref 45–75)
SODIUM BLDV-SCNC: 135 MMOL/L (ref 136–145)
SODIUM SERPL-SCNC: 139 MMOL/L (ref 136–145)
T AXIS: 62 DEGREES
T OFFSET: 401 MS
VENTRICULAR RATE: 93 BPM
WBC # BLD AUTO: 5.3 X10*3/UL (ref 4.4–11.3)

## 2025-08-04 PROCEDURE — 93005 ELECTROCARDIOGRAM TRACING: CPT

## 2025-08-04 PROCEDURE — 71260 CT THORAX DX C+: CPT | Performed by: RADIOLOGY

## 2025-08-04 PROCEDURE — 73564 X-RAY EXAM KNEE 4 OR MORE: CPT | Mod: LEFT SIDE | Performed by: RADIOLOGY

## 2025-08-04 PROCEDURE — 73060 X-RAY EXAM OF HUMERUS: CPT | Mod: RIGHT SIDE | Performed by: RADIOLOGY

## 2025-08-04 PROCEDURE — 73630 X-RAY EXAM OF FOOT: CPT | Mod: LT

## 2025-08-04 PROCEDURE — 73630 X-RAY EXAM OF FOOT: CPT | Mod: LEFT SIDE | Performed by: RADIOLOGY

## 2025-08-04 PROCEDURE — 73130 X-RAY EXAM OF HAND: CPT | Mod: RIGHT SIDE | Performed by: RADIOLOGY

## 2025-08-04 PROCEDURE — 72125 CT NECK SPINE W/O DYE: CPT

## 2025-08-04 PROCEDURE — 36415 COLL VENOUS BLD VENIPUNCTURE: CPT | Performed by: PHYSICIAN ASSISTANT

## 2025-08-04 PROCEDURE — 73590 X-RAY EXAM OF LOWER LEG: CPT | Mod: LT

## 2025-08-04 PROCEDURE — 96375 TX/PRO/DX INJ NEW DRUG ADDON: CPT

## 2025-08-04 PROCEDURE — 74177 CT ABD & PELVIS W/CONTRAST: CPT | Performed by: RADIOLOGY

## 2025-08-04 PROCEDURE — 85610 PROTHROMBIN TIME: CPT | Performed by: PHYSICIAN ASSISTANT

## 2025-08-04 PROCEDURE — 99285 EMERGENCY DEPT VISIT HI MDM: CPT | Mod: 25 | Performed by: EMERGENCY MEDICINE

## 2025-08-04 PROCEDURE — 2500000004 HC RX 250 GENERAL PHARMACY W/ HCPCS (ALT 636 FOR OP/ED): Performed by: PHYSICIAN ASSISTANT

## 2025-08-04 PROCEDURE — 73090 X-RAY EXAM OF FOREARM: CPT | Mod: RT

## 2025-08-04 PROCEDURE — 73610 X-RAY EXAM OF ANKLE: CPT | Mod: LT

## 2025-08-04 PROCEDURE — 84132 ASSAY OF SERUM POTASSIUM: CPT | Performed by: PHYSICIAN ASSISTANT

## 2025-08-04 PROCEDURE — 74177 CT ABD & PELVIS W/CONTRAST: CPT

## 2025-08-04 PROCEDURE — 73590 X-RAY EXAM OF LOWER LEG: CPT | Mod: LEFT SIDE | Performed by: RADIOLOGY

## 2025-08-04 PROCEDURE — 2550000001 HC RX 255 CONTRASTS: Performed by: PHYSICIAN ASSISTANT

## 2025-08-04 PROCEDURE — 73564 X-RAY EXAM KNEE 4 OR MORE: CPT | Mod: LT

## 2025-08-04 PROCEDURE — 96365 THER/PROPH/DIAG IV INF INIT: CPT

## 2025-08-04 PROCEDURE — 70450 CT HEAD/BRAIN W/O DYE: CPT

## 2025-08-04 PROCEDURE — 96366 THER/PROPH/DIAG IV INF ADDON: CPT

## 2025-08-04 PROCEDURE — 71260 CT THORAX DX C+: CPT

## 2025-08-04 PROCEDURE — 73060 X-RAY EXAM OF HUMERUS: CPT | Mod: RT

## 2025-08-04 PROCEDURE — 72128 CT CHEST SPINE W/O DYE: CPT | Performed by: RADIOLOGY

## 2025-08-04 PROCEDURE — 72125 CT NECK SPINE W/O DYE: CPT | Performed by: RADIOLOGY

## 2025-08-04 PROCEDURE — 70450 CT HEAD/BRAIN W/O DYE: CPT | Performed by: RADIOLOGY

## 2025-08-04 PROCEDURE — 73130 X-RAY EXAM OF HAND: CPT | Mod: RT

## 2025-08-04 PROCEDURE — 83735 ASSAY OF MAGNESIUM: CPT | Performed by: PHYSICIAN ASSISTANT

## 2025-08-04 PROCEDURE — 72131 CT LUMBAR SPINE W/O DYE: CPT | Performed by: RADIOLOGY

## 2025-08-04 PROCEDURE — 85025 COMPLETE CBC W/AUTO DIFF WBC: CPT | Performed by: PHYSICIAN ASSISTANT

## 2025-08-04 PROCEDURE — 73610 X-RAY EXAM OF ANKLE: CPT | Mod: LEFT SIDE | Performed by: RADIOLOGY

## 2025-08-04 PROCEDURE — 73090 X-RAY EXAM OF FOREARM: CPT | Mod: RIGHT SIDE | Performed by: RADIOLOGY

## 2025-08-04 PROCEDURE — 96361 HYDRATE IV INFUSION ADD-ON: CPT

## 2025-08-04 PROCEDURE — 84484 ASSAY OF TROPONIN QUANT: CPT | Performed by: PHYSICIAN ASSISTANT

## 2025-08-04 PROCEDURE — 96367 TX/PROPH/DG ADDL SEQ IV INF: CPT

## 2025-08-04 RX ORDER — ONDANSETRON HYDROCHLORIDE 2 MG/ML
4 INJECTION, SOLUTION INTRAVENOUS ONCE
Status: COMPLETED | OUTPATIENT
Start: 2025-08-04 | End: 2025-08-04

## 2025-08-04 RX ORDER — MAGNESIUM SULFATE HEPTAHYDRATE 40 MG/ML
2 INJECTION, SOLUTION INTRAVENOUS ONCE
Status: COMPLETED | OUTPATIENT
Start: 2025-08-04 | End: 2025-08-04

## 2025-08-04 RX ORDER — ACETAMINOPHEN 10 MG/ML
1000 INJECTION, SOLUTION INTRAVENOUS ONCE
Status: COMPLETED | OUTPATIENT
Start: 2025-08-04 | End: 2025-08-04

## 2025-08-04 RX ADMIN — SODIUM CHLORIDE 1000 ML: 0.9 INJECTION, SOLUTION INTRAVENOUS at 10:48

## 2025-08-04 RX ADMIN — ACETAMINOPHEN 1000 MG: 10 INJECTION INTRAVENOUS at 10:47

## 2025-08-04 RX ADMIN — IOHEXOL 75 ML: 350 INJECTION, SOLUTION INTRAVENOUS at 12:08

## 2025-08-04 RX ADMIN — ONDANSETRON 4 MG: 2 INJECTION, SOLUTION INTRAMUSCULAR; INTRAVENOUS at 10:48

## 2025-08-04 RX ADMIN — MAGNESIUM SULFATE HEPTAHYDRATE 2 G: 40 INJECTION, SOLUTION INTRAVENOUS at 13:16

## 2025-08-04 ASSESSMENT — PAIN DESCRIPTION - ORIENTATION: ORIENTATION: LEFT

## 2025-08-04 ASSESSMENT — PAIN SCALES - GENERAL: PAINLEVEL_OUTOF10: 4

## 2025-08-04 ASSESSMENT — PAIN DESCRIPTION - LOCATION: LOCATION: LEG

## 2025-08-04 ASSESSMENT — PAIN - FUNCTIONAL ASSESSMENT: PAIN_FUNCTIONAL_ASSESSMENT: 0-10

## 2025-08-04 ASSESSMENT — PAIN DESCRIPTION - PAIN TYPE: TYPE: ACUTE PAIN

## 2025-08-04 NOTE — ED PROVIDER NOTES
HPI   Chief Complaint   Patient presents with    Fall    Leg Injury       55-year-old female here with left ankle pain after fall.  Patient was walking down the stairs which she had thrown laundry down and tripped and fell over the laundry.  She did not hit her head or lose consciousness.  She called her daughter and then EMS.  Upon arrival EMS provided her with 10 mg of ketamine which unfortunately made the patient vomit.  She then received 4 mg of oral Zofran.  Patient has been drowsy since receiving the ketamine.  She reports only left ankle pain.  Daughter is at bedside and states her mother was acting normally prior to getting medications.              Patient History   Medical History[1]  Surgical History[2]  Family History[3]  Social History[4]    Physical Exam   ED Triage Vitals [08/04/25 1031]   Temperature Heart Rate Respirations BP   36 °C (96.8 °F) (!) 107 18 169/90      Pulse Ox Temp src Heart Rate Source Patient Position   100 % -- -- --      BP Location FiO2 (%)     -- --       Physical Exam  Constitutional:       General: She is not in acute distress.     Appearance: She is not ill-appearing or toxic-appearing.      Comments: Drowsy but arousable   HENT:      Head: Normocephalic and atraumatic.      Right Ear: External ear normal.      Left Ear: External ear normal.      Nose: Nose normal.      Mouth/Throat:      Mouth: Mucous membranes are moist.     Eyes:      Extraocular Movements: Extraocular movements intact.      Pupils: Pupils are equal, round, and reactive to light.       Cardiovascular:      Rate and Rhythm: Regular rhythm. Tachycardia present.      Pulses: Normal pulses.      Heart sounds: Normal heart sounds. No murmur heard.     No friction rub. No gallop.   Pulmonary:      Effort: Pulmonary effort is normal. No respiratory distress.      Breath sounds: Normal breath sounds. No wheezing, rhonchi or rales.   Chest:      Chest wall: No tenderness.   Abdominal:      General: There is no  distension.      Palpations: Abdomen is soft.      Tenderness: There is abdominal tenderness. There is no guarding.      Comments: Patient with diffuse abdominal tenderness, mild.  Not peritonitic.     Musculoskeletal:         General: Swelling, tenderness, deformity and signs of injury present.      Cervical back: No tenderness.      Comments: Deformity of left tib-fib, distally.  Able to wiggle toes.  Pulses intact.     Skin:     General: Skin is warm and dry.      Capillary Refill: Capillary refill takes less than 2 seconds.      Findings: Bruising present.      Comments: Bruising to left tib-fib     Neurological:      General: No focal deficit present.      Mental Status: She is oriented to person, place, and time.      Sensory: No sensory deficit.      Motor: No weakness.      Comments: Oriented when aroused.   Psychiatric:      Comments: Drowsy secondary to medication.           ED Course & MDM   ED Course as of 08/04/25 1548   Mon Aug 04, 2025   1107 ECG 12 lead  As interpreted by me, normal sinus rhythm at a rate of 93 normal MD QRS is normal at 84 normal axis QTc normal no ST or T wave abnormalities.  No STEMI or STEMI equivalents. normal progression [MW]   1108 CBC and Auto Differential(!)  No leukocytosis or left shift, no anemia or thrombocytopenia. [MW]   1154 Protime-INR  Normal [MW]   1154 Comprehensive metabolic panel(!)  Hyperglycemia, otherwise electrolytes normal.  Normal renal and hepatic function [MW]   1155 Magnesium(!)  Low.  Will replete IV. [MW]   1155 Blood Gas Venous Full Panel(!)  Acceptable gas with exception of lactate which is 3.0.  Will give IV fluids and recheck [MW]   1156 Troponin I Series, High Sensitivity (0, 1 HR)  Negative [MW]   1312 CT head wo IV contrast [MW]   1312 CT cervical spine wo IV contrast [MW]   1312 CT thoracic spine retrospective reconstruction protocol [MW]   1313 CT lumbar spine retrospective reconstruction protocol [MW]   1313 CT chest abdomen pelvis w IV  contrast [MW]   1313 XR knee left 4+ views [MW]   1313 XR tibia fibula left 2 views [MW]   1313 XR ankle left 3+ views [MW]   1313 XR foot left 3+ views [MW]   1319 All imaging negative however patient with discomfort of the right arm.  Will obtain x-ray imaging. [MW]      ED Course User Index  [MW] Randy Cruz PA-C         Diagnoses as of 08/04/25 1548   Fall, initial encounter                 No data recorded     [place] Coma Scale Score: 15 (08/04/25 1032 : Aracely Matthews RN)                           Medical Decision Making  55-year-old female here after fall down the stairs.  She fell approximately 4-5 stairs.  She did not lose consciousness.  She did receive ketamine and is drowsy from that.  She did vomit x 1 after ketamine administration.  Given her drowsiness from the ketamine, we will obtain a CT pan scan.  Only outward sign of trauma is the left distal tib-fib swelling and ecchymosis.    Scans and x-rays negative.  Patient did develop redness of the right upper extremity that is not painful.  X-rays were obtained which are negative for any acute pathology.  Potential drug reaction to ketamine which was administered prior to arrival in the right upper extremity.  That is where her IV is.  Patient and patient family counseled to monitor this area.  She has no numbness or tingling.  5 out of 5 strength.  Pulses remained intact.  Good cap refill.        Procedure  Procedures       [1]   Past Medical History:  Diagnosis Date    Acute candidiasis of vulva and vagina 01/20/2015    Yeast vaginitis    Acute laryngitis 12/18/2014    Acute laryngitis    Acute maxillary sinusitis, unspecified 03/08/2020    Acute maxillary sinusitis    Candidiasis, unspecified 12/20/2017    Yeast infection    Depression     Diabetes mellitus (Multi)     Other conditions influencing health status     History of burning on urination    Other conditions influencing health status 09/30/2016    Cyst    Other conditions influencing  health status 03/06/2014    History of dyspareunia    Other displaced fracture of base of first metacarpal bone, left hand, initial encounter for closed fracture 07/22/2021    Closed displaced fracture of base of first metacarpal bone of left hand, unspecified fracture morphology, initial encounter    Pelvic and perineal pain 01/23/2017    Pelvic pain in female    Personal history of other benign neoplasm 10/03/2016    History of uterine leiomyoma    Personal history of other diseases of the circulatory system 01/23/2017    History of hypertension    Personal history of other diseases of the musculoskeletal system and connective tissue 12/08/2015    History of low back pain    Personal history of other diseases of the respiratory system 02/24/2020    History of influenza    Personal history of other diseases of the respiratory system 09/26/2018    History of acute bronchitis    Personal history of other diseases of the respiratory system 02/15/2014    Personal history of acute sinusitis    Personal history of other diseases of the respiratory system 04/22/2019    History of acute bronchitis    Personal history of other diseases of the respiratory system 11/21/2017    History of acute sinusitis    Personal history of other diseases of urinary system 08/22/2017    History of cystocele    Personal history of other endocrine, nutritional and metabolic disease 10/16/2014    History of type 2 diabetes mellitus    Personal history of other mental and behavioral disorders 05/28/2013    History of depression    Personal history of other specified conditions 03/25/2014    History of chest pain    Personal history of other specified conditions 08/22/2017    History of dysuria    Personal history of other specified conditions 03/19/2015    History of diarrhea    Personal history of other specified conditions 10/10/2014    History of abdominal pain    Phlebitis and thrombophlebitis of other sites 03/20/2020    Thrombophlebitis  arm    Phlebitis and thrombophlebitis of other sites 03/20/2020    Phlebitis of arm    Right lower quadrant pain 11/01/2013    Abdominal pain, RLQ (right lower quadrant)    Unspecified condition associated with female genital organs and menstrual cycle 12/21/2015    Adnexal cyst    Unspecified symptoms and signs involving the genitourinary system 10/17/2016    UTI symptoms   [2]   Past Surgical History:  Procedure Laterality Date    CHOLECYSTECTOMY  05/28/2013    Cholecystectomy    TOTAL KNEE ARTHROPLASTY Right     TOTAL VAGINAL HYSTERECTOMY  01/23/2017    Vaginal Hysterectomy    TUBAL LIGATION  05/28/2013    Tubal Ligation   [3] No family history on file.  [4]   Social History  Tobacco Use    Smoking status: Never    Smokeless tobacco: Never   Vaping Use    Vaping status: Never Used   Substance Use Topics    Alcohol use: Never    Drug use: Never        Randy Cruz PA-C  08/04/25 1545

## 2025-08-13 ENCOUNTER — HOSPITAL ENCOUNTER (OUTPATIENT)
Dept: RADIOLOGY | Facility: CLINIC | Age: 55
Discharge: HOME | End: 2025-08-13
Payer: COMMERCIAL

## 2025-08-13 ENCOUNTER — OFFICE VISIT (OUTPATIENT)
Dept: PRIMARY CARE | Facility: CLINIC | Age: 55
End: 2025-08-13
Payer: COMMERCIAL

## 2025-08-13 VITALS
BODY MASS INDEX: 35.26 KG/M2 | HEIGHT: 63 IN | OXYGEN SATURATION: 98 % | HEART RATE: 104 BPM | DIASTOLIC BLOOD PRESSURE: 80 MMHG | SYSTOLIC BLOOD PRESSURE: 136 MMHG | WEIGHT: 199 LBS

## 2025-08-13 DIAGNOSIS — M79.605 PAIN OF LEFT LOWER EXTREMITY: Primary | ICD-10-CM

## 2025-08-13 DIAGNOSIS — M79.605 PAIN OF LEFT LOWER EXTREMITY: ICD-10-CM

## 2025-08-13 PROCEDURE — 73590 X-RAY EXAM OF LOWER LEG: CPT | Mod: LEFT SIDE | Performed by: RADIOLOGY

## 2025-08-13 PROCEDURE — 3075F SYST BP GE 130 - 139MM HG: CPT | Performed by: FAMILY MEDICINE

## 2025-08-13 PROCEDURE — 3008F BODY MASS INDEX DOCD: CPT | Performed by: FAMILY MEDICINE

## 2025-08-13 PROCEDURE — 73590 X-RAY EXAM OF LOWER LEG: CPT | Mod: LT

## 2025-08-13 PROCEDURE — 3079F DIAST BP 80-89 MM HG: CPT | Performed by: FAMILY MEDICINE

## 2025-08-13 PROCEDURE — 3051F HG A1C>EQUAL 7.0%<8.0%: CPT | Performed by: FAMILY MEDICINE

## 2025-08-13 PROCEDURE — 99213 OFFICE O/P EST LOW 20 MIN: CPT | Performed by: FAMILY MEDICINE

## 2025-08-13 PROCEDURE — 1036F TOBACCO NON-USER: CPT | Performed by: FAMILY MEDICINE

## 2025-08-13 RX ORDER — PREDNISONE 10 MG/1
TABLET ORAL
Qty: 30 TABLET | Refills: 0 | Status: SHIPPED | OUTPATIENT
Start: 2025-08-13 | End: 2025-08-23

## 2025-08-13 RX ORDER — HYDROCODONE BITARTRATE AND ACETAMINOPHEN 5; 325 MG/1; MG/1
1 TABLET ORAL EVERY 6 HOURS PRN
Qty: 20 TABLET | Refills: 0 | Status: SHIPPED | OUTPATIENT
Start: 2025-08-13 | End: 2025-08-20

## 2025-08-13 ASSESSMENT — ENCOUNTER SYMPTOMS
LOSS OF SENSATION IN FEET: 0
DEPRESSION: 0
OCCASIONAL FEELINGS OF UNSTEADINESS: 0

## 2025-08-18 ENCOUNTER — OFFICE VISIT (OUTPATIENT)
Dept: PRIMARY CARE | Facility: CLINIC | Age: 55
End: 2025-08-18
Payer: COMMERCIAL

## 2025-08-18 ENCOUNTER — APPOINTMENT (OUTPATIENT)
Dept: VASCULAR MEDICINE | Facility: HOSPITAL | Age: 55
End: 2025-08-18
Payer: COMMERCIAL

## 2025-08-18 VITALS
DIASTOLIC BLOOD PRESSURE: 90 MMHG | HEIGHT: 63 IN | HEART RATE: 114 BPM | OXYGEN SATURATION: 98 % | WEIGHT: 194 LBS | SYSTOLIC BLOOD PRESSURE: 144 MMHG | BODY MASS INDEX: 34.38 KG/M2

## 2025-08-18 DIAGNOSIS — G25.81 RESTLESS LEG: ICD-10-CM

## 2025-08-18 DIAGNOSIS — T14.8XXA HEMATOMA: ICD-10-CM

## 2025-08-18 DIAGNOSIS — M79.605 PAIN OF LEFT LOWER EXTREMITY: Primary | ICD-10-CM

## 2025-08-18 PROCEDURE — 3051F HG A1C>EQUAL 7.0%<8.0%: CPT

## 2025-08-18 PROCEDURE — 99213 OFFICE O/P EST LOW 20 MIN: CPT

## 2025-08-18 PROCEDURE — 3008F BODY MASS INDEX DOCD: CPT

## 2025-08-18 PROCEDURE — 3080F DIAST BP >= 90 MM HG: CPT

## 2025-08-18 PROCEDURE — 1036F TOBACCO NON-USER: CPT

## 2025-08-18 PROCEDURE — 3077F SYST BP >= 140 MM HG: CPT

## 2025-08-18 RX ORDER — ROPINIROLE 0.5 MG/1
0.5 TABLET, FILM COATED ORAL NIGHTLY
Qty: 90 TABLET | Refills: 3 | Status: SHIPPED | OUTPATIENT
Start: 2025-08-18

## 2025-08-18 RX ORDER — DOXYCYCLINE 100 MG/1
100 CAPSULE ORAL 2 TIMES DAILY
Qty: 20 CAPSULE | Refills: 0 | Status: SHIPPED | OUTPATIENT
Start: 2025-08-18 | End: 2025-08-28

## 2025-08-19 ENCOUNTER — OFFICE VISIT (OUTPATIENT)
Dept: SURGERY | Facility: CLINIC | Age: 55
End: 2025-08-19
Payer: COMMERCIAL

## 2025-08-19 VITALS
HEIGHT: 62 IN | SYSTOLIC BLOOD PRESSURE: 169 MMHG | HEART RATE: 101 BPM | WEIGHT: 195.6 LBS | OXYGEN SATURATION: 97 % | DIASTOLIC BLOOD PRESSURE: 111 MMHG | BODY MASS INDEX: 35.99 KG/M2

## 2025-08-19 DIAGNOSIS — S80.12XA HEMATOMA OF LEFT LOWER EXTREMITY, INITIAL ENCOUNTER: Primary | ICD-10-CM

## 2025-08-19 PROCEDURE — 3080F DIAST BP >= 90 MM HG: CPT | Performed by: SURGERY

## 2025-08-19 PROCEDURE — 3077F SYST BP >= 140 MM HG: CPT | Performed by: SURGERY

## 2025-08-19 PROCEDURE — 3051F HG A1C>EQUAL 7.0%<8.0%: CPT | Performed by: SURGERY

## 2025-08-19 PROCEDURE — 1036F TOBACCO NON-USER: CPT | Performed by: SURGERY

## 2025-08-19 PROCEDURE — 3008F BODY MASS INDEX DOCD: CPT | Performed by: SURGERY

## 2025-08-19 PROCEDURE — 99203 OFFICE O/P NEW LOW 30 MIN: CPT | Performed by: SURGERY

## 2025-08-21 LAB
ATRIAL RATE: 93 BPM
P AXIS: 37 DEGREES
P OFFSET: 194 MS
P ONSET: 142 MS
PR INTERVAL: 150 MS
Q ONSET: 217 MS
QRS COUNT: 16 BEATS
QRS DURATION: 84 MS
QT INTERVAL: 368 MS
QTC CALCULATION(BAZETT): 457 MS
QTC FREDERICIA: 426 MS
R AXIS: 44 DEGREES
T AXIS: 62 DEGREES
T OFFSET: 401 MS
VENTRICULAR RATE: 93 BPM

## 2025-09-02 ENCOUNTER — APPOINTMENT (OUTPATIENT)
Dept: SURGERY | Facility: CLINIC | Age: 55
End: 2025-09-02
Payer: COMMERCIAL

## 2025-10-07 ENCOUNTER — APPOINTMENT (OUTPATIENT)
Dept: SURGERY | Facility: CLINIC | Age: 55
End: 2025-10-07
Payer: COMMERCIAL